# Patient Record
Sex: MALE | Race: WHITE | NOT HISPANIC OR LATINO | Employment: OTHER | URBAN - METROPOLITAN AREA
[De-identification: names, ages, dates, MRNs, and addresses within clinical notes are randomized per-mention and may not be internally consistent; named-entity substitution may affect disease eponyms.]

---

## 2017-04-17 ENCOUNTER — HISTORICAL (OUTPATIENT)
Dept: ADMINISTRATIVE | Facility: HOSPITAL | Age: 62
End: 2017-04-17

## 2017-05-17 ENCOUNTER — OFFICE VISIT (OUTPATIENT)
Dept: ALLERGY | Facility: CLINIC | Age: 62
End: 2017-05-17
Payer: COMMERCIAL

## 2017-05-17 VITALS
OXYGEN SATURATION: 98 % | SYSTOLIC BLOOD PRESSURE: 138 MMHG | WEIGHT: 203.19 LBS | DIASTOLIC BLOOD PRESSURE: 80 MMHG | BODY MASS INDEX: 27.52 KG/M2 | HEART RATE: 88 BPM | HEIGHT: 72 IN | RESPIRATION RATE: 18 BRPM

## 2017-05-17 DIAGNOSIS — L21.8 SEBORRHEA-LIKE DERMATITIS WITH PSORIASIFORM ELEMENTS: ICD-10-CM

## 2017-05-17 DIAGNOSIS — J30.1 NON-SEASONAL ALLERGIC RHINITIS DUE TO POLLEN: ICD-10-CM

## 2017-05-17 PROCEDURE — 95117 IMMUNOTHERAPY INJECTIONS: CPT | Mod: ,,, | Performed by: ALLERGY & IMMUNOLOGY

## 2017-05-17 PROCEDURE — 99499 UNLISTED E&M SERVICE: CPT | Mod: ,,, | Performed by: ALLERGY & IMMUNOLOGY

## 2017-05-17 RX ORDER — MINERAL OIL
180 ENEMA (ML) RECTAL DAILY
COMMUNITY
End: 2019-05-09

## 2017-05-17 NOTE — PROGRESS NOTES
Patient presents for immunotherapy. Tolerated his last injection.     Today pt received:   1:10 0.1 rx 1  1:10 0.1 rx 2    Pt observed 30 mins and discharged in good condition.

## 2017-05-17 NOTE — MR AVS SNAPSHOT
[facility]  [address]  [address]  [place] [address]  Phone: [phone]  Fax: [phone]                  Doug Quiros   2017 4:00 PM   Office Visit    Description:  Male : 1955   Provider:  Karen Parker MD   Department:  [facility] - Allergy           Reason for Visit     Immunotherapy           Diagnoses this Visit        Comments    Non-seasonal allergic rhinitis due to pollen         Seborrhea-like dermatitis with psoriasiform elements                To Do List           Goals (5 Years of Data)     None           Medications           Message regarding Medications     Verify the changes and/or additions to your medication regime listed below are the same as discussed with your clinician today.  If any of these changes or additions are incorrect, please notify your healthcare provider.             Verify that the below list of medications is an accurate representation of the medications you are currently taking.  If none reported, the list may be blank. If incorrect, please contact your healthcare provider. Carry this list with you in case of emergency.           Current Medications     fexofenadine (ALLEGRA ALLERGY) 180 MG tablet Take 180 mg by mouth once daily.           Clinical Reference Information           Your Vitals Were     BP Pulse Resp Height Weight SpO2    138/80 88 18 6' (1.829 m) 92.2 kg (203 lb 3.2 oz) 98%    PF BMI             450 L/min 27.56 kg/m2         Blood Pressure          Most Recent Value    BP  138/80      Allergies as of 2017     No Known Allergies      Immunizations Administered on Date of Encounter - 2017     None      [facility] Sign UP     Activating your [facility] account is as easy as 1-2-3!     1) Visit [email or web address], select Sign Up Now, enter this activation code and your date of birth, then select Next.  [email or web address]  Expires: 2017  4:36 PM      2) Create a username and password to use when you  visit [facility] in the future and select a security question in case you lose your password and select Next.    3) Enter your e-mail address and click Sign Up!    Additional Information  If you have questions, please  call [phone] to talk to our [facility] staff. Remember, [facility] is NOT to be used for urgent needs. For medical emergencies, dial 911.

## 2017-05-24 ENCOUNTER — CLINICAL SUPPORT (OUTPATIENT)
Dept: ALLERGY | Facility: CLINIC | Age: 62
End: 2017-05-24
Payer: COMMERCIAL

## 2017-05-24 VITALS — DIASTOLIC BLOOD PRESSURE: 80 MMHG | HEART RATE: 73 BPM | SYSTOLIC BLOOD PRESSURE: 122 MMHG | OXYGEN SATURATION: 98 %

## 2017-05-24 DIAGNOSIS — J30.1 NON-SEASONAL ALLERGIC RHINITIS DUE TO POLLEN: Primary | ICD-10-CM

## 2017-05-24 PROCEDURE — 95117 IMMUNOTHERAPY INJECTIONS: CPT | Mod: ,,, | Performed by: ALLERGY & IMMUNOLOGY

## 2017-05-24 PROCEDURE — 99499 UNLISTED E&M SERVICE: CPT | Mod: ,,, | Performed by: ALLERGY & IMMUNOLOGY

## 2017-05-24 RX ORDER — LEVOCETIRIZINE DIHYDROCHLORIDE 5 MG/1
5 TABLET, FILM COATED ORAL NIGHTLY
COMMUNITY
End: 2018-11-01 | Stop reason: SDUPTHER

## 2017-05-25 NOTE — PROGRESS NOTES
Pt presents for immunotherapy    Rx vial 1 and 2  0.2 mL 1:10    Pt tolerating injections and states his rhinitis is controlled.     Pt observed in clinic for 30 mins and discharged in good condition.

## 2017-05-31 ENCOUNTER — CLINICAL SUPPORT (OUTPATIENT)
Dept: ALLERGY | Facility: CLINIC | Age: 62
End: 2017-05-31
Payer: COMMERCIAL

## 2017-05-31 VITALS — OXYGEN SATURATION: 99 % | DIASTOLIC BLOOD PRESSURE: 80 MMHG | SYSTOLIC BLOOD PRESSURE: 130 MMHG | HEART RATE: 69 BPM

## 2017-05-31 DIAGNOSIS — J30.1 NON-SEASONAL ALLERGIC RHINITIS DUE TO POLLEN: Primary | ICD-10-CM

## 2017-05-31 PROCEDURE — 95117 IMMUNOTHERAPY INJECTIONS: CPT | Mod: ,,, | Performed by: ALLERGY & IMMUNOLOGY

## 2017-05-31 PROCEDURE — 99499 UNLISTED E&M SERVICE: CPT | Mod: ,,, | Performed by: ALLERGY & IMMUNOLOGY

## 2017-05-31 NOTE — PATIENT INSTRUCTIONS
Continue weekly injection.     Continue antihistamine prior to your injection.      please call if you have any questions or concerns.

## 2017-05-31 NOTE — PROGRESS NOTES
Pt presents for his weekly allergy immunotherapy. He tolerates his injections well.     Rx vial 1   1:10   0.3    Rx vial 2   1:10   0.3      Pt observed in clinic for 30 mins and discharged in good condition.

## 2017-06-07 ENCOUNTER — CLINICAL SUPPORT (OUTPATIENT)
Dept: ALLERGY | Facility: CLINIC | Age: 62
End: 2017-06-07
Payer: COMMERCIAL

## 2017-06-07 VITALS
SYSTOLIC BLOOD PRESSURE: 124 MMHG | HEART RATE: 81 BPM | BODY MASS INDEX: 28.21 KG/M2 | DIASTOLIC BLOOD PRESSURE: 84 MMHG | WEIGHT: 208 LBS | OXYGEN SATURATION: 98 %

## 2017-06-07 DIAGNOSIS — J30.1 NON-SEASONAL ALLERGIC RHINITIS DUE TO POLLEN: Primary | ICD-10-CM

## 2017-06-07 PROCEDURE — 99499 UNLISTED E&M SERVICE: CPT | Mod: ,,, | Performed by: ALLERGY & IMMUNOLOGY

## 2017-06-07 PROCEDURE — 95117 IMMUNOTHERAPY INJECTIONS: CPT | Mod: ,,, | Performed by: ALLERGY & IMMUNOLOGY

## 2017-06-07 NOTE — PROGRESS NOTES
Pt presents for his weekly allergy immunotherapy. He tolerates his injections well.      Rx vial 1   1:10   0.4     Rx vial 2   1:10   0.4        Pt observed in clinic for 30 mins and discharged in good condition.

## 2017-06-14 ENCOUNTER — CLINICAL SUPPORT (OUTPATIENT)
Dept: ALLERGY | Facility: CLINIC | Age: 62
End: 2017-06-14
Payer: COMMERCIAL

## 2017-06-14 VITALS — SYSTOLIC BLOOD PRESSURE: 130 MMHG | HEART RATE: 81 BPM | DIASTOLIC BLOOD PRESSURE: 70 MMHG | OXYGEN SATURATION: 98 %

## 2017-06-14 DIAGNOSIS — J30.1 NON-SEASONAL ALLERGIC RHINITIS DUE TO POLLEN: Primary | ICD-10-CM

## 2017-06-14 PROCEDURE — 99499 UNLISTED E&M SERVICE: CPT | Mod: ,,, | Performed by: ALLERGY & IMMUNOLOGY

## 2017-06-14 PROCEDURE — 95117 IMMUNOTHERAPY INJECTIONS: CPT | Mod: ,,, | Performed by: ALLERGY & IMMUNOLOGY

## 2017-06-15 NOTE — PROGRESS NOTES
Immunotherapy Note: Allergy Shot      Subjective:       Patient ID: Doug Quiros is a 61 y.o. male presents for weekly immunotherapy  Tolerated last injection  No New Medications  Beta Blockers: not on beta blocker    Concentration:    Volume:     Rx vial 1   1:10   0.5     Rx vial 2   1:10   0.5      Objective:     Doug Quiros observed for 30 minutes and discharged in good condition        Discussion:     Pt understands the current recommendation. All questions answered to the best of my ability. Continue current management plan.          Electronically signed by Karen Parker    Allergy/Imunology  [facility]  [address]  [place] [address]   Office [phone]

## 2017-06-21 ENCOUNTER — CLINICAL SUPPORT (OUTPATIENT)
Dept: ALLERGY | Facility: CLINIC | Age: 62
End: 2017-06-21
Payer: COMMERCIAL

## 2017-06-21 VITALS
BODY MASS INDEX: 27.92 KG/M2 | HEIGHT: 72 IN | WEIGHT: 206.13 LBS | DIASTOLIC BLOOD PRESSURE: 70 MMHG | SYSTOLIC BLOOD PRESSURE: 120 MMHG

## 2017-06-21 DIAGNOSIS — J30.1 NON-SEASONAL ALLERGIC RHINITIS DUE TO POLLEN: Primary | ICD-10-CM

## 2017-06-21 PROCEDURE — 99499 UNLISTED E&M SERVICE: CPT | Mod: ,,, | Performed by: ALLERGY & IMMUNOLOGY

## 2017-06-21 PROCEDURE — 95117 IMMUNOTHERAPY INJECTIONS: CPT | Mod: ,,, | Performed by: ALLERGY & IMMUNOLOGY

## 2017-06-21 NOTE — PROGRESS NOTES
Immunotherapy Note: Allergy Shot      Subjective:       Patient ID: Doug Quiros is a 61 y.o. male presents for weekly immunotherapy  Tolerated last injection  No New Medications  Beta Blockers: not on beta blocker    Concentration:  1:10 Rx vial 1 and 2    Volume: 0.5 mL rx vial 1 and 2       Objective:     Doug Quiros observed for 30 minutes and discharged in good condition        Discussion:     Pt understands the current recommendation. All questions answered to the best of my ability. Continue current management plan.          Electronically signed by Karen Parker    Allergy/Imunology  [facility]  [address]  [place] [address]   Office [phone]

## 2017-06-29 ENCOUNTER — CLINICAL SUPPORT (OUTPATIENT)
Dept: ALLERGY | Facility: CLINIC | Age: 62
End: 2017-06-29
Payer: COMMERCIAL

## 2017-06-29 VITALS
WEIGHT: 207 LBS | BODY MASS INDEX: 28.07 KG/M2 | OXYGEN SATURATION: 97 % | DIASTOLIC BLOOD PRESSURE: 80 MMHG | SYSTOLIC BLOOD PRESSURE: 120 MMHG | HEART RATE: 75 BPM

## 2017-06-29 DIAGNOSIS — J30.1 NON-SEASONAL ALLERGIC RHINITIS DUE TO POLLEN: Primary | ICD-10-CM

## 2017-06-29 PROCEDURE — 99499 UNLISTED E&M SERVICE: CPT | Mod: ,,, | Performed by: ALLERGY & IMMUNOLOGY

## 2017-06-29 PROCEDURE — 95117 IMMUNOTHERAPY INJECTIONS: CPT | Mod: ,,, | Performed by: ALLERGY & IMMUNOLOGY

## 2017-06-29 NOTE — PROGRESS NOTES
Immunotherapy Note: Allergy Shot      Subjective:       Patient ID: Doug Quiros is a 61 y.o. male presents for weekly immunotherapy  Tolerated last injection  No New Medications  Beta Blockers: not on beta blocker    Reaction with last injection?: No  If female, are you pregnant?: No  Are you on any beta blockers?: No  Has the vial ?: No    Are you ill today?: No  Asthma exacerbation or symptoms: No  Do you have a fever today?: No    Peak Flow: 680    Vial Concentration: 1:1 v/v (RED)  Dose (mL) #1: 0.2 mL  Location: Right upper arm  Reaction (Wheal/flare in mm) #1: 0  Given By: rw    Vial Concentration: 1:1 v/v (RED)  Dose (mL) #2: 0.2 mL  Location: Left upper arm  Reaction (Wheal/flare in mm) #2: 0  Given By : rw      Objective:     Doug Quiros observed for 30 minutes and discharged in good condition        Discussion:     Pt understands the current recommendation. All questions answered to the best of my ability. Continue current management plan.          Electronically signed by Karen Parker    Allergy/Imunology  [facility]  [place] [address] [facility]  [place] [address]   Office [phone]

## 2017-07-05 ENCOUNTER — TELEPHONE (OUTPATIENT)
Dept: ALLERGY | Facility: CLINIC | Age: 62
End: 2017-07-05

## 2017-07-05 NOTE — TELEPHONE ENCOUNTER
Pt states that he has two little tender bumps under his (l) axilla that are now indurated. Cancelled his injection this week (out of town). Last injection was Thursday 06/29/17. Unsure if this could be related to his allergy shot.

## 2017-07-06 ENCOUNTER — DOCUMENTATION ONLY (OUTPATIENT)
Dept: ALLERGY | Facility: CLINIC | Age: 62
End: 2017-07-06

## 2017-07-06 DIAGNOSIS — L08.9 STAPH SKIN INFECTION: Primary | ICD-10-CM

## 2017-07-06 DIAGNOSIS — B95.8 STAPH SKIN INFECTION: Primary | ICD-10-CM

## 2017-07-06 RX ORDER — CLINDAMYCIN HYDROCHLORIDE 300 MG/1
300 CAPSULE ORAL EVERY 8 HOURS
Qty: 21 CAPSULE | Refills: 0 | Status: SHIPPED | OUTPATIENT
Start: 2017-07-06 | End: 2017-07-13

## 2017-07-06 NOTE — PROGRESS NOTES
Pt swings by for me to look at bumps under his left arm.   Appear to be abscesses. Will give antibiotics. Clindamycin 300 mg po tid x 7 days.

## 2017-07-13 ENCOUNTER — CLINICAL SUPPORT (OUTPATIENT)
Dept: ALLERGY | Facility: CLINIC | Age: 62
End: 2017-07-13
Payer: COMMERCIAL

## 2017-07-13 VITALS — OXYGEN SATURATION: 98 % | HEART RATE: 65 BPM | SYSTOLIC BLOOD PRESSURE: 120 MMHG | DIASTOLIC BLOOD PRESSURE: 80 MMHG

## 2017-07-13 DIAGNOSIS — J30.89 CHRONIC NONSEASONAL ALLERGIC RHINITIS DUE TO POLLEN: Primary | ICD-10-CM

## 2017-07-13 PROCEDURE — 95117 IMMUNOTHERAPY INJECTIONS: CPT | Mod: ,,, | Performed by: ALLERGY & IMMUNOLOGY

## 2017-07-13 PROCEDURE — 99499 UNLISTED E&M SERVICE: CPT | Mod: ,,, | Performed by: ALLERGY & IMMUNOLOGY

## 2017-07-14 NOTE — PROGRESS NOTES
Immunotherapy Note: Allergy Shot      Subjective:       Patient ID: Doug Quiros is a 61 y.o. male presents for weekly immunotherapy  Tolerated last injection  No New Medications  Beta Blockers: not on beta blocker    Reaction with last injection?: No  If female, are you pregnant?: No  Are you on any beta blockers?: No  Has the vial ?: No    Are you ill today?: No  Asthma exacerbation or symptoms: No  Do you have a fever today?: No    Peak Flow: 540    Expiration Date #1: 18  Vial Concentration: 1:1 v/v (RED)  Dose (mL) #1: 0.2 mL  Location: Right upper arm  Given By: rw    Expiration Date #2: 18  Vial Concentration: 1:1 v/v (RED)  Dose (mL) #2: 0.2 mL  Location: Left upper arm  Given By : rw      Objective:     Doug Quiros observed for 30 minutes and discharged in good condition        Discussion:     Pt understands the current recommendation. All questions answered to the best of my ability. Continue current management plan.          Electronically signed by Karen Parker    Allergy/Imunology  [facility]  [place] [address] [facility] [facility]  [place] [address]   Office [phone]

## 2017-07-20 ENCOUNTER — CLINICAL SUPPORT (OUTPATIENT)
Dept: ALLERGY | Facility: CLINIC | Age: 62
End: 2017-07-20
Payer: COMMERCIAL

## 2017-07-20 VITALS
OXYGEN SATURATION: 98 % | SYSTOLIC BLOOD PRESSURE: 130 MMHG | WEIGHT: 207 LBS | BODY MASS INDEX: 28.07 KG/M2 | DIASTOLIC BLOOD PRESSURE: 70 MMHG | HEART RATE: 71 BPM

## 2017-07-20 DIAGNOSIS — J30.89 CHRONIC NONSEASONAL ALLERGIC RHINITIS DUE TO POLLEN: Primary | ICD-10-CM

## 2017-07-20 PROCEDURE — 95117 IMMUNOTHERAPY INJECTIONS: CPT | Mod: ,,, | Performed by: ALLERGY & IMMUNOLOGY

## 2017-07-20 PROCEDURE — 99499 UNLISTED E&M SERVICE: CPT | Mod: ,,, | Performed by: ALLERGY & IMMUNOLOGY

## 2017-07-20 NOTE — PROGRESS NOTES
Immunotherapy Note: Allergy Shot      Subjective:       Patient ID: Doug Quiros is a 61 y.o. male presents for weekly immunotherapy  Tolerated last injection  No New Medications  Beta Blockers: not on beta blocker  Reaction with last injection?: No  If female, are you pregnant?: No  Are you on any beta blockers?: No  Has the vial ?: No    Are you ill today?: No  Asthma exacerbation or symptoms: No  Do you have a fever today?: No    Peak Flow: 540    Expiration Date #1: 18  Vial Concentration: 1:1 v/v (RED)  Dose (mL) #1: 0.3 mL  Location: Right upper arm  Given By: rw    Expiration Date #2: 18  Vial Concentration: 1:1 v/v (RED)  Dose (mL) #2: 0.3 mL  Location: Left upper arm  Given By : rw                         Objective:     Doug Quiros observed for 30 minutes and discharged in good condition        Discussion:     Pt understands the current recommendation. All questions answered to the best of my ability. Continue current management plan.          Electronically signed by Karen Parker    Allergy/Imunology  [facility]  [place] [address] [address] [facility]  [place] [address]   Office [phone]

## 2017-07-27 ENCOUNTER — CLINICAL SUPPORT (OUTPATIENT)
Dept: ALLERGY | Facility: CLINIC | Age: 62
End: 2017-07-27
Payer: COMMERCIAL

## 2017-07-27 VITALS — DIASTOLIC BLOOD PRESSURE: 74 MMHG | HEART RATE: 83 BPM | OXYGEN SATURATION: 98 % | SYSTOLIC BLOOD PRESSURE: 120 MMHG

## 2017-07-27 DIAGNOSIS — J30.89 CHRONIC NONSEASONAL ALLERGIC RHINITIS DUE TO POLLEN: Primary | ICD-10-CM

## 2017-07-27 PROCEDURE — 99499 UNLISTED E&M SERVICE: CPT | Mod: ,,, | Performed by: ALLERGY & IMMUNOLOGY

## 2017-07-27 PROCEDURE — 95117 IMMUNOTHERAPY INJECTIONS: CPT | Mod: ,,, | Performed by: ALLERGY & IMMUNOLOGY

## 2017-07-29 NOTE — PROGRESS NOTES
Immunotherapy Note: Allergy Shot      Subjective:       Patient ID: Doug Quiros is a 61 y.o. male presents for weekly immunotherapy  Tolerated last injection  No New Medications  Beta Blockers: not on beta blocker            Peak Flow: 560    Expiration Date #1: 02/08/18  Vial Concentration: 1:1 v/v (RED)  Dose (mL) #1: 0.4 mL  Location: Right upper arm  Given By: rw    Expiration Date #2: 02/14/18  Vial Concentration: 1:1 v/v (RED)  Dose (mL) #2: 0.4 mL  Location: Left upper arm  Given By : rw                         Objective:     Doug Quiros observed for 30 minutes and discharged in good condition        Discussion:     Pt understands the current recommendation. All questions answered to the best of my ability. Continue current management plan.          Electronically signed by Karen Parker    Allergy/Imunology  [facility]  [place] [address]  [place] [address]   Office [phone]

## 2017-08-03 ENCOUNTER — CLINICAL SUPPORT (OUTPATIENT)
Dept: ALLERGY | Facility: CLINIC | Age: 62
End: 2017-08-03
Payer: COMMERCIAL

## 2017-08-03 VITALS — HEART RATE: 72 BPM | DIASTOLIC BLOOD PRESSURE: 74 MMHG | SYSTOLIC BLOOD PRESSURE: 120 MMHG | OXYGEN SATURATION: 99 %

## 2017-08-03 DIAGNOSIS — J30.89 CHRONIC NONSEASONAL ALLERGIC RHINITIS DUE TO POLLEN: Primary | ICD-10-CM

## 2017-08-03 PROCEDURE — 95117 IMMUNOTHERAPY INJECTIONS: CPT | Mod: ,,, | Performed by: ALLERGY & IMMUNOLOGY

## 2017-08-03 PROCEDURE — 99499 UNLISTED E&M SERVICE: CPT | Mod: ,,, | Performed by: ALLERGY & IMMUNOLOGY

## 2017-08-03 NOTE — PROGRESS NOTES
Immunotherapy Note: Allergy Shot      Subjective:       Patient ID: Doug Quiros is a 61 y.o. male presents for weekly immunotherapy  Tolerated last injection  No New Medications  Beta Blockers: not on beta blocker  Reaction with last injection?: No  If female, are you pregnant?: No  Are you on any beta blockers?: No  Has the vial ?: No    Are you ill today?: No  Asthma exacerbation or symptoms: No  Do you have a fever today?: No    Peak Flow: 550    Expiration Date #1: 18  Vial Concentration: 1:1 v/v (RED)  Dose (mL) #1: 0.5 mL  Location: Right upper arm  Given By: rw    Expiration Date #2: 17  Vial Concentration: 1:1 v/v (RED)  Dose (mL) #2: 0.5 mL  Location: Left upper arm  Given By : rw                         Objective:     Doug Quiros observed for 30 minutes and discharged in good condition        Discussion:     Pt understands the current recommendation. All questions answered to the best of my ability. Continue current management plan.          Electronically signed by Karen Parker    Allergy/Imunology  [facility]  [place] [address] [address] [facility]  [place] [address]   Office [phone]

## 2017-08-15 ENCOUNTER — CLINICAL SUPPORT (OUTPATIENT)
Dept: ALLERGY | Facility: CLINIC | Age: 62
End: 2017-08-15
Payer: COMMERCIAL

## 2017-08-15 VITALS — SYSTOLIC BLOOD PRESSURE: 122 MMHG | HEART RATE: 84 BPM | OXYGEN SATURATION: 98 % | DIASTOLIC BLOOD PRESSURE: 84 MMHG

## 2017-08-15 DIAGNOSIS — J30.89 CHRONIC NONSEASONAL ALLERGIC RHINITIS DUE TO POLLEN: Primary | ICD-10-CM

## 2017-08-15 PROCEDURE — 95117 IMMUNOTHERAPY INJECTIONS: CPT | Mod: ,,, | Performed by: ALLERGY & IMMUNOLOGY

## 2017-08-15 PROCEDURE — 99499 UNLISTED E&M SERVICE: CPT | Mod: ,,, | Performed by: ALLERGY & IMMUNOLOGY

## 2017-08-15 NOTE — PROGRESS NOTES
Immunotherapy Note: Allergy Shot      Subjective:       Patient ID: Doug Quiros is a 61 y.o. male presents for weekly immunotherapy  Tolerated last injection  No New Medications  Beta Blockers: not on beta blocker    Reaction with last injection?: No  If female, are you pregnant?: No  Are you on any beta blockers?: No  Has the vial ?: No    Are you ill today?: No  Asthma exacerbation or symptoms: No  Do you have a fever today?: No    Peak Flow: 600    Expiration Date #1: 18  Vial Concentration: 1:1 v/v (RED)  Dose (mL) #1: 0.5 mL  Location: Right upper arm  Given By: rw    Expiration Date #2: 18  Vial Concentration: 1:1 v/v (RED)  Dose (mL) #2: 0.5 mL  Location: Left upper arm  Given By : rw                       Objective:     Doug Quiros observed for 30 minutes and discharged in good condition        Discussion:     Pt understands the current recommendation. All questions answered to the best of my ability. Continue current management plan.          Electronically signed by Karen Parker    Allergy/Imunology  [facility]  [place] [address] [address] [facility]  [place] [address]   Office [phone]

## 2017-08-29 ENCOUNTER — CLINICAL SUPPORT (OUTPATIENT)
Dept: ALLERGY | Facility: CLINIC | Age: 62
End: 2017-08-29
Payer: COMMERCIAL

## 2017-08-29 VITALS
HEIGHT: 72 IN | DIASTOLIC BLOOD PRESSURE: 70 MMHG | BODY MASS INDEX: 28.27 KG/M2 | SYSTOLIC BLOOD PRESSURE: 140 MMHG | WEIGHT: 208.69 LBS

## 2017-08-29 DIAGNOSIS — J30.89 CHRONIC NONSEASONAL ALLERGIC RHINITIS DUE TO POLLEN: Primary | ICD-10-CM

## 2017-08-29 PROCEDURE — 95117 IMMUNOTHERAPY INJECTIONS: CPT | Mod: ,,, | Performed by: ALLERGY & IMMUNOLOGY

## 2017-08-29 PROCEDURE — 99499 UNLISTED E&M SERVICE: CPT | Mod: ,,, | Performed by: ALLERGY & IMMUNOLOGY

## 2017-08-30 NOTE — PROGRESS NOTES
Immunotherapy Note: Allergy Shot      Subjective:       Patient ID: Doug Quiros is a 61 y.o. male presents for monthly immunotherapy  Tolerated last injection  No New Medications  Beta Blockers: not on beta blocker    Reaction with last injection?: No  If female, are you pregnant?: No  Are you on any beta blockers?: No  Has the vial ?: No    Are you ill today?: No  Asthma exacerbation or symptoms: No  Do you have a fever today?: No    Peak Flow: 560    Expiration Date #1: 18  Vial Concentration: 1:1 v/v (RED)  Dose (mL) #1: 0.5 mL  Location: Right upper arm  Given By: sd    Expiration Date #2: 18  Vial Concentration: 1:1 v/v (RED)  Dose (mL) #2: 0.5 mL  Location: Left upper arm  Given By : sd                           Objective:     Doug Quiros observed for 30 minutes and discharged in good condition        Discussion:     Pt understands the current recommendation. All questions answered to the best of my ability. Continue current management plan.      Electronically signed by Karen Parker    Allergy/Imunology  [facility]  [place] [address]  [place] [address]   Office [phone]

## 2017-09-14 ENCOUNTER — CLINICAL SUPPORT (OUTPATIENT)
Dept: ALLERGY | Facility: CLINIC | Age: 62
End: 2017-09-14
Payer: COMMERCIAL

## 2017-09-14 VITALS — OXYGEN SATURATION: 97 % | HEART RATE: 85 BPM | SYSTOLIC BLOOD PRESSURE: 122 MMHG | DIASTOLIC BLOOD PRESSURE: 80 MMHG

## 2017-09-14 DIAGNOSIS — J30.89 CHRONIC NONSEASONAL ALLERGIC RHINITIS DUE TO POLLEN: Primary | ICD-10-CM

## 2017-09-14 PROCEDURE — 95117 IMMUNOTHERAPY INJECTIONS: CPT | Mod: ,,, | Performed by: ALLERGY & IMMUNOLOGY

## 2017-09-14 PROCEDURE — 99499 UNLISTED E&M SERVICE: CPT | Mod: ,,, | Performed by: ALLERGY & IMMUNOLOGY

## 2017-09-20 NOTE — PROGRESS NOTES
Immunotherapy Note: Allergy Shot      Subjective:       Patient ID: Doug Quiros is a 61 y.o. male presents for monthly immunotherapy  Tolerated last injection  No New Medications  Beta Blockers: not on beta blocker    Reaction with last injection?: No  If female, are you pregnant?: No  Are you on any beta blockers?: No  Has the vial ?: No    Are you ill today?: No  Asthma exacerbation or symptoms: No  Do you have a fever today?: No    Peak Flow: 650    Expiration Date #1: 18  Vial Concentration: 1:1 v/v (RED)  Dose (mL) #1: 0.5 mL  Location: Right upper arm  Given By: smd    Expiration Date #2: 18  Vial Concentration: 1:1 v/v (RED)  Dose (mL) #2: 0.5 mL  Location: Left upper arm  Given By : eladio                           Objective:     oDug Quiros observed for 30 minutes and discharged in good condition        Discussion:     Pt understands the current recommendation. All questions answered to the best of my ability. Continue current management plan.      Electronically signed by Karen Parker    Allergy/Imunology  [facility]  [place] [address] [address]  [place] [address]   Office [phone]

## 2017-10-04 ENCOUNTER — CLINICAL SUPPORT (OUTPATIENT)
Dept: ALLERGY | Facility: CLINIC | Age: 62
End: 2017-10-04
Payer: COMMERCIAL

## 2017-10-04 VITALS — SYSTOLIC BLOOD PRESSURE: 138 MMHG | DIASTOLIC BLOOD PRESSURE: 88 MMHG | OXYGEN SATURATION: 98 % | HEART RATE: 88 BPM

## 2017-10-04 DIAGNOSIS — J30.89 CHRONIC NONSEASONAL ALLERGIC RHINITIS DUE TO POLLEN: Primary | ICD-10-CM

## 2017-10-04 PROCEDURE — 95117 IMMUNOTHERAPY INJECTIONS: CPT | Mod: ,,, | Performed by: ALLERGY & IMMUNOLOGY

## 2017-10-04 PROCEDURE — 99499 UNLISTED E&M SERVICE: CPT | Mod: ,,, | Performed by: ALLERGY & IMMUNOLOGY

## 2017-10-09 NOTE — PROGRESS NOTES
Immunotherapy Note: Allergy Shot      Subjective:       Patient ID: Doug Quiros is a 61 y.o. male presents for monthly immunotherapy  Tolerated last injection  No New Medications  Beta Blockers: not on beta blocker    Reaction with last injection?: No  If female, are you pregnant?: No  Are you on any beta blockers?: No    Are you ill today?: No  Asthma exacerbation or symptoms: No  Do you have a fever today?: No    Peak Flow: 530    Expiration Date #1: 02/14/18  Vial Concentration: 1:1 v/v (RED)  Dose (mL) #1: 0.5 mL  Location: Right upper arm  Given By: sd    Expiration Date #2: 02/14/18  Vial Concentration: 1:1 v/v (RED)  Dose (mL) #2: 0.5 mL  Location: Left upper arm  Given By : sd                           Objective:     Doug Quiros observed for 30 minutes and discharged in good condition        Discussion:     Pt understands the current recommendation. All questions answered to the best of my ability. Continue current management plan.      Electronically signed by Karen Parker    Allergy/Imunology  [facility]  [place] [address]  [place] [address]   Office [phone]

## 2017-11-09 ENCOUNTER — CLINICAL SUPPORT (OUTPATIENT)
Dept: ALLERGY | Facility: CLINIC | Age: 62
End: 2017-11-09
Payer: COMMERCIAL

## 2017-11-09 DIAGNOSIS — J30.89 CHRONIC NONSEASONAL ALLERGIC RHINITIS DUE TO POLLEN: Primary | ICD-10-CM

## 2017-11-09 PROCEDURE — 99499 UNLISTED E&M SERVICE: CPT | Mod: ,,, | Performed by: ALLERGY & IMMUNOLOGY

## 2017-11-09 PROCEDURE — 95117 IMMUNOTHERAPY INJECTIONS: CPT | Mod: ,,, | Performed by: ALLERGY & IMMUNOLOGY

## 2017-11-10 VITALS
WEIGHT: 214.75 LBS | DIASTOLIC BLOOD PRESSURE: 70 MMHG | BODY MASS INDEX: 29.12 KG/M2 | SYSTOLIC BLOOD PRESSURE: 140 MMHG

## 2017-11-11 NOTE — PROGRESS NOTES
Immunotherapy Note: Allergy Shot      Subjective:       Patient ID: Doug Quiros is a 61 y.o. male presents for monthly immunotherapy  Tolerated last injection  No New Medications  Beta Blockers: not on beta blocker    Reaction with last injection?: No  If female, are you pregnant?: No  Are you on any beta blockers?: No  Has the vial ?: No    Are you ill today?: No  Asthma exacerbation or symptoms: No  Do you have a fever today?: No    Peak Flow: 550    Expiration Date #1: 18  Vial Concentration: 1:1 v/v (RED)  Dose (mL) #1: 0.5 mL  Location: Right upper arm  Given By: rw    Expiration Date #2: 18  Vial Concentration: 1:1 v/v (RED)  Dose (mL) #2: 0.5 mL  Location: Left upper arm  Given By : rw                           Objective:     Doug Quiros observed for 30 minutes and discharged in good condition        Discussion:     Pt understands the current recommendation. All questions answered to the best of my ability. Continue current management plan.      Electronically signed by Karen Parker    Allergy/Imunology  [facility]  [place] [address]  [place] [address]   Office [phone]

## 2017-11-16 ENCOUNTER — DOCUMENTATION ONLY (OUTPATIENT)
Dept: ALLERGY | Facility: CLINIC | Age: 62
End: 2017-11-16
Payer: COMMERCIAL

## 2017-11-16 DIAGNOSIS — J30.89 CHRONIC NONSEASONAL ALLERGIC RHINITIS DUE TO POLLEN: ICD-10-CM

## 2017-11-16 PROCEDURE — 95165 ANTIGEN THERAPY SERVICES: CPT | Mod: ,,, | Performed by: ALLERGY & IMMUNOLOGY

## 2017-11-16 NOTE — PROGRESS NOTES
Using 70% alcohol clean surface was prepped. Sterile drape then covered clean space.  Hands were scrubbed and placed into sterile gloves. Gown, mask, and hair net worn according to  guidelines allergist exemption.    40 units.     Karen Parker M.D.  Allergy/Immunology  [facility]   [phone] phone  [phone] fax

## 2017-12-11 ENCOUNTER — CLINICAL SUPPORT (OUTPATIENT)
Dept: ALLERGY | Facility: CLINIC | Age: 62
End: 2017-12-11
Payer: COMMERCIAL

## 2017-12-11 VITALS — DIASTOLIC BLOOD PRESSURE: 78 MMHG | SYSTOLIC BLOOD PRESSURE: 120 MMHG | OXYGEN SATURATION: 98 % | HEART RATE: 75 BPM

## 2017-12-11 DIAGNOSIS — J30.89 CHRONIC NONSEASONAL ALLERGIC RHINITIS DUE TO POLLEN: Primary | ICD-10-CM

## 2017-12-11 PROCEDURE — 95117 IMMUNOTHERAPY INJECTIONS: CPT | Mod: ,,, | Performed by: ALLERGY & IMMUNOLOGY

## 2017-12-11 PROCEDURE — 99499 UNLISTED E&M SERVICE: CPT | Mod: ,,, | Performed by: ALLERGY & IMMUNOLOGY

## 2017-12-11 NOTE — PROGRESS NOTES
Immunotherapy Note: Allergy Shot      Subjective:       Patient ID: Doug Quiros is a 62 y.o. male presents for monthly immunotherapy  Tolerated last injection  No New Medications  Beta Blockers: not on beta blocker    Reaction with last injection?: No  If female, are you pregnant?: No  Are you on any beta blockers?: No  Has the vial ?: No    Are you ill today?: No  Asthma exacerbation or symptoms: No  Do you have a fever today?: No    Peak Flow: 600    Expiration Date #1: 11/10/18  Vial Concentration: 1:1 v/v (RED)  Dose (mL) #1: 0.4 mL  Location: Right upper arm  Given By: rw    Expiration Date #2: 11/10/18  Vial Concentration: 1:1 v/v (RED)  Dose (mL) #2: 0.4 mL  Location: Left upper arm  Given By : rw                           Objective:     Doug Quiros observed for 30 minutes and discharged in good condition        Discussion:     Pt understands the current recommendation. All questions answered to the best of my ability. Continue current management plan.      Electronically signed by Karen Parker    Allergy/Imunology  [facility]  [place] [address] [address]  [place] [address]   Office [phone]

## 2018-01-11 ENCOUNTER — CLINICAL SUPPORT (OUTPATIENT)
Dept: ALLERGY | Facility: CLINIC | Age: 63
End: 2018-01-11
Payer: COMMERCIAL

## 2018-01-11 VITALS
BODY MASS INDEX: 27.66 KG/M2 | DIASTOLIC BLOOD PRESSURE: 84 MMHG | SYSTOLIC BLOOD PRESSURE: 158 MMHG | WEIGHT: 204.19 LBS | HEIGHT: 72 IN

## 2018-01-11 DIAGNOSIS — J30.89 CHRONIC NONSEASONAL ALLERGIC RHINITIS DUE TO POLLEN: Primary | ICD-10-CM

## 2018-01-11 PROCEDURE — 99499 UNLISTED E&M SERVICE: CPT | Mod: ,,, | Performed by: ALLERGY & IMMUNOLOGY

## 2018-01-11 PROCEDURE — 95117 IMMUNOTHERAPY INJECTIONS: CPT | Mod: ,,, | Performed by: ALLERGY & IMMUNOLOGY

## 2018-01-11 NOTE — PROGRESS NOTES
Immunotherapy Note: Allergy Shot      Subjective:       Patient ID: Doug Quiros is a 62 y.o. male presents for monthly immunotherapy  Tolerated last injection  No New Medications  Beta Blockers: not on beta blocker    Reaction with last injection?: No  If female, are you pregnant?: No  Are you on any beta blockers?: No  Has the vial ?: No    Are you ill today?: No  Asthma exacerbation or symptoms: No  Do you have a fever today?: No    Peak Flow: 700    Expiration Date #1: 11/10/18  Vial Concentration: 1:1 v/v (RED)  Dose (mL) #1: 0.5 mL  Location: Right upper arm  Given By: ANEUDY    Expiration Date #2: 11/10/18  Vial Concentration: 1:1 v/v (RED)  Dose (mL) #2: 0.5 mL  Location: Left upper arm  Given By : ANEUDY                           Objective:     Doug Quiros observed for 30 minutes and discharged in good condition        Discussion:     Pt understands the current recommendation. All questions answered to the best of my ability. Continue current management plan.      Electronically signed by Karen Parker    Allergy/Imunology  [facility]  [place] [address]  [place] [address]   Office [phone]

## 2018-02-08 ENCOUNTER — CLINICAL SUPPORT (OUTPATIENT)
Dept: ALLERGY | Facility: CLINIC | Age: 63
End: 2018-02-08
Payer: COMMERCIAL

## 2018-02-08 VITALS
WEIGHT: 198.63 LBS | SYSTOLIC BLOOD PRESSURE: 116 MMHG | DIASTOLIC BLOOD PRESSURE: 70 MMHG | BODY MASS INDEX: 26.9 KG/M2 | HEIGHT: 72 IN

## 2018-02-08 DIAGNOSIS — J30.89 CHRONIC NONSEASONAL ALLERGIC RHINITIS DUE TO POLLEN: Primary | ICD-10-CM

## 2018-02-08 PROCEDURE — 99499 UNLISTED E&M SERVICE: CPT | Mod: ,,, | Performed by: ALLERGY & IMMUNOLOGY

## 2018-02-08 PROCEDURE — 95117 IMMUNOTHERAPY INJECTIONS: CPT | Mod: ,,, | Performed by: ALLERGY & IMMUNOLOGY

## 2018-02-08 NOTE — PROGRESS NOTES
Immunotherapy Note: Allergy Shot      Subjective:       Patient ID: Doug Quiros is a 62 y.o. male presents for monthly immunotherapy  Tolerated last injection  No New Medications  Beta Blockers: not on beta blocker           Reaction with last injection?: No  If female, are you pregnant?: No  Are you on any beta blockers?: No  Has the vial ?: No     Are you ill today?: No  Asthma exacerbation or symptoms: No  Do you have a fever today?: No     Peak Flow: 610     Expiration Date #1: 11/10/18  Vial Concentration: 1:1 v/v (RED)  Dose (mL) #1: 0.5 mL  Location: Right upper arm  Given By: ANEUDY     Expiration Date #2: 11/10/18  Vial Concentration: 1:1 v/v (RED)  Dose (mL) #2: 0.5 mL  Location: Left upper arm  Given By : ANEUDY                                          Objective:     Doug Quiros observed for 30 minutes and discharged in good condition        Discussion:     Pt understands the current recommendation. All questions answered to the best of my ability. Continue current management plan.      Electronically signed by Karen Parker    Allergy/Imunology  [facility]  [place] [address]  [place] [address]   Office [phone]

## 2018-03-08 ENCOUNTER — DOCUMENTATION ONLY (OUTPATIENT)
Dept: ALLERGY | Facility: CLINIC | Age: 63
End: 2018-03-08

## 2018-03-08 ENCOUNTER — CLINICAL SUPPORT (OUTPATIENT)
Dept: ALLERGY | Facility: CLINIC | Age: 63
End: 2018-03-08
Payer: COMMERCIAL

## 2018-03-08 VITALS
SYSTOLIC BLOOD PRESSURE: 142 MMHG | WEIGHT: 190.81 LBS | HEIGHT: 72 IN | DIASTOLIC BLOOD PRESSURE: 78 MMHG | BODY MASS INDEX: 25.84 KG/M2

## 2018-03-08 DIAGNOSIS — J30.89 CHRONIC NONSEASONAL ALLERGIC RHINITIS DUE TO POLLEN: ICD-10-CM

## 2018-03-08 DIAGNOSIS — J30.89 CHRONIC NONSEASONAL ALLERGIC RHINITIS DUE TO POLLEN: Primary | ICD-10-CM

## 2018-03-08 PROCEDURE — 95117 IMMUNOTHERAPY INJECTIONS: CPT | Mod: ,,, | Performed by: ALLERGY & IMMUNOLOGY

## 2018-03-08 PROCEDURE — 95165 ANTIGEN THERAPY SERVICES: CPT | Mod: ,,, | Performed by: ALLERGY & IMMUNOLOGY

## 2018-03-08 PROCEDURE — 99499 UNLISTED E&M SERVICE: CPT | Mod: ,,, | Performed by: ALLERGY & IMMUNOLOGY

## 2018-03-08 NOTE — PROGRESS NOTES
Using 70% alcohol clean surface was prepped. Sterile drape then covered clean space.  Hands were scrubbed and placed into sterile gloves. Gown, mask, and hair net worn according to  guidelines allergist exemption.    Mixed extracts march 7th.     20 units

## 2018-03-08 NOTE — PROGRESS NOTES
Immunotherapy Note: Allergy Shot      Subjective:       Patient ID: Doug Quiros is a 62 y.o. male presents for monthly immunotherapy  Tolerated last injection  No New Medications  Beta Blockers: not on beta blocker    Reaction with last injection?: No   If female, are you pregnant?: No  Are you on any beta blockers?: No  Has the vial ?: No     Are you ill today?: No  Asthma exacerbation or symptoms: No  Do you have a fever today?: No    Peak Flow: 560    Expiration Date #1: 11/10/18  Vial Concentration: 1:1 v/v (RED)  Dose (mL) #1: 0.5 mL   Location: Right upper arm  Given By: ANEUDY    Expiration Date #2: 11/10/18  Vial Concentration: 1:1 v/v (RED)   Dose (mL) #2: 0.5 mL   Location: Left upper arm  Given By : ANEUDY                           Objective:     Doug Quiros observed for 30 minutes and discharged in good condition        Discussion:     Pt understands the current recommendation. All questions answered to the best of my ability. Continue current management plan.      Electronically signed by Karne Parker    Allergy/Imunology  [facility]  [place] [address] [address]  [place] [address]   Office [phone]

## 2018-04-12 ENCOUNTER — CLINICAL SUPPORT (OUTPATIENT)
Dept: ALLERGY | Facility: CLINIC | Age: 63
End: 2018-04-12
Payer: COMMERCIAL

## 2018-04-12 VITALS
OXYGEN SATURATION: 97 % | HEIGHT: 72 IN | SYSTOLIC BLOOD PRESSURE: 140 MMHG | DIASTOLIC BLOOD PRESSURE: 70 MMHG | HEART RATE: 84 BPM | WEIGHT: 183 LBS | BODY MASS INDEX: 24.79 KG/M2

## 2018-04-12 DIAGNOSIS — J30.89 CHRONIC NONSEASONAL ALLERGIC RHINITIS DUE TO POLLEN: Primary | ICD-10-CM

## 2018-04-12 PROCEDURE — 99499 UNLISTED E&M SERVICE: CPT | Mod: ,,, | Performed by: ALLERGY & IMMUNOLOGY

## 2018-04-12 PROCEDURE — 95117 IMMUNOTHERAPY INJECTIONS: CPT | Mod: ,,, | Performed by: ALLERGY & IMMUNOLOGY

## 2018-04-14 NOTE — PROGRESS NOTES
Immunotherapy Note: Allergy Shot      Subjective:       Patient ID: Doug Quiros is a 62 y.o. male presents for weekly immunotherapy  Tolerated last injection  No New Medications  Beta Blockers: not on beta blocker    Reaction with last injection?: No  Are you on any beta blockers?: No  Has the vial ?: No    Are you ill today?: No  Asthma exacerbation or symptoms: No  Do you have a fever today?: No    Peak Flow: 540    Expiration Date #1: 11/10/18  Vial Concentration: 1:1 v/v (RED)  Dose (mL) #1: 0.5 mL  Location: Right upper arm  Comment #1: rw    Expiration Date #2: 11/10/18  Vial Concentration: 1:1 v/v (RED)  Dose (mL) #2: 0.5 mL  Location: Left upper arm  Given By : rw                           Objective:     Doug Quiros observed for 30 minutes and discharged in good condition        Discussion:     Pt understands the current recommendation. All questions answered to the best of my ability. Continue current management plan.      Electronically signed by Karen Parker    Allergy/Imunology  [facility]  [place] [address]  [place] [address]   Office [phone]

## 2018-05-10 ENCOUNTER — CLINICAL SUPPORT (OUTPATIENT)
Dept: ALLERGY | Facility: CLINIC | Age: 63
End: 2018-05-10
Payer: COMMERCIAL

## 2018-05-10 VITALS
BODY MASS INDEX: 24.72 KG/M2 | SYSTOLIC BLOOD PRESSURE: 118 MMHG | HEIGHT: 72 IN | WEIGHT: 182.5 LBS | DIASTOLIC BLOOD PRESSURE: 80 MMHG

## 2018-05-10 DIAGNOSIS — J30.1 NON-SEASONAL ALLERGIC RHINITIS DUE TO POLLEN: Primary | ICD-10-CM

## 2018-05-10 PROCEDURE — 99499 UNLISTED E&M SERVICE: CPT | Mod: ,,, | Performed by: ALLERGY & IMMUNOLOGY

## 2018-05-10 PROCEDURE — 95117 IMMUNOTHERAPY INJECTIONS: CPT | Mod: ,,, | Performed by: ALLERGY & IMMUNOLOGY

## 2018-05-10 NOTE — PROGRESS NOTES
Immunotherapy Note: Allergy Shot      Subjective:       Patient ID: Doug Quiros is a 62 y.o. male presents for monthly immunotherapy  Tolerated last injection  No New Medications  Beta Blockers: not on beta blocker    Reaction with last injection?: No  Are you on any beta blockers?: No  Has the vial ?: No    Are you ill today?: No  Asthma exacerbation or symptoms: No  Do you have a fever today?: No    Peak Flow: 560    Expiration Date #1: 11/10/18  Vial Concentration: 1:1 v/v (RED)  Dose (mL) #1: 0.5 mL  Location: Right upper arm  Given By: AM    Expiration Date #2: 11/10/18  Vial Concentration: 1:1 v/v (RED)  Dose (mL) #2: 0.5 mL  Location: Right upper arm  Given By : AM                           Objective:     Doug Quiros observed for 30 minutes and discharged in good condition        Discussion:     Pt understands the current recommendation. All questions answered to the best of my ability. Continue current management plan.      Electronically signed by Karen Parker    Allergy/Imunology  [facility]  [place] [address]  [place] [address]   Office [phone]

## 2018-06-07 ENCOUNTER — CLINICAL SUPPORT (OUTPATIENT)
Dept: ALLERGY | Facility: CLINIC | Age: 63
End: 2018-06-07
Payer: COMMERCIAL

## 2018-06-07 VITALS
OXYGEN SATURATION: 98 % | HEART RATE: 71 BPM | WEIGHT: 175 LBS | BODY MASS INDEX: 23.7 KG/M2 | DIASTOLIC BLOOD PRESSURE: 78 MMHG | SYSTOLIC BLOOD PRESSURE: 126 MMHG | HEIGHT: 72 IN

## 2018-06-07 DIAGNOSIS — J30.1 NON-SEASONAL ALLERGIC RHINITIS DUE TO POLLEN: Primary | ICD-10-CM

## 2018-06-07 PROCEDURE — 95117 IMMUNOTHERAPY INJECTIONS: CPT | Mod: ,,, | Performed by: ALLERGY & IMMUNOLOGY

## 2018-06-07 PROCEDURE — 99499 UNLISTED E&M SERVICE: CPT | Mod: ,,, | Performed by: ALLERGY & IMMUNOLOGY

## 2018-06-07 NOTE — PROGRESS NOTES
Immunotherapy Note: Allergy Shot      Subjective:       Patient ID: Doug Quiros is a 62 y.o. male presents for monthly immunotherapy  Tolerated last injection  No New Medications  Beta Blockers: not on beta blocker    Reaction with last injection?: No  Are you on any beta blockers?: No  Has the vial ?: No    Are you ill today?: No  Asthma exacerbation or symptoms: No  Do you have a fever today?: No    Peak Flow: 580    Expiration Date #1: 11/10/18  Vial Concentration: 1:1 v/v (RED)  Dose (mL) #1: 0.5 mL  Location: Right upper arm  Given By: rw    Expiration Date #2: 11/10/18  Vial Concentration: 1:1 v/v (RED)  Dose (mL) #2: 0.5 mL  Location: Left upper arm  Given By : rw                           Objective:     Doug Quiros observed for 30 minutes and discharged in good condition        Discussion:     Pt understands the current recommendation. All questions answered to the best of my ability. Continue current management plan.      Electronically signed by Karen Parker    Allergy/Imunology  [facility]  [place] [address]  [place] [address]   Office [phone]

## 2018-07-12 ENCOUNTER — CLINICAL SUPPORT (OUTPATIENT)
Dept: ALLERGY | Facility: CLINIC | Age: 63
End: 2018-07-12
Payer: COMMERCIAL

## 2018-07-12 VITALS
DIASTOLIC BLOOD PRESSURE: 68 MMHG | HEIGHT: 72 IN | BODY MASS INDEX: 23.3 KG/M2 | WEIGHT: 172 LBS | SYSTOLIC BLOOD PRESSURE: 130 MMHG

## 2018-07-12 DIAGNOSIS — J30.1 NON-SEASONAL ALLERGIC RHINITIS DUE TO POLLEN: Primary | ICD-10-CM

## 2018-07-12 PROCEDURE — 99499 UNLISTED E&M SERVICE: CPT | Mod: ,,, | Performed by: ALLERGY & IMMUNOLOGY

## 2018-07-12 PROCEDURE — 95117 IMMUNOTHERAPY INJECTIONS: CPT | Mod: ,,, | Performed by: ALLERGY & IMMUNOLOGY

## 2018-07-12 NOTE — PROGRESS NOTES
Immunotherapy Note: Allergy Shot      Subjective:       Patient ID: Doug Quiros is a 62 y.o. male presents for monthly immunotherapy  Tolerated last injection  No New Medications  Beta Blockers: not on beta blocker    Reaction with last injection?: No  If female, are you pregnant?: No  Are you on any beta blockers?: No  Has the vial ?: No    Are you ill today?: No  Asthma exacerbation or symptoms: No  Do you have a fever today?: No    Peak Flow: 410    Expiration Date #1: 11/10/18  Vial Concentration: 1:1 v/v (RED)  Dose (mL) #1: 0.5 mL  Location: Right upper arm  Given By: AM    Expiration Date #2: 11/10/18  Vial Concentration: 1:1 v/v (RED)  Dose (mL) #2: 0.5 mL  Location: Left upper arm  Given By : AM                           Objective:     Doug Quiros observed for 30 minutes and discharged in good condition        Discussion:     Pt understands the current recommendation. All questions answered to the best of my ability. Continue current management plan.      Electronically signed by Karen Parker    Allergy/Imunology  [facility]  [place] [address]  [place] [address]   Office [phone]

## 2018-08-09 ENCOUNTER — CLINICAL SUPPORT (OUTPATIENT)
Dept: ALLERGY | Facility: CLINIC | Age: 63
End: 2018-08-09
Payer: COMMERCIAL

## 2018-08-09 VITALS
BODY MASS INDEX: 23.04 KG/M2 | WEIGHT: 170.13 LBS | SYSTOLIC BLOOD PRESSURE: 122 MMHG | DIASTOLIC BLOOD PRESSURE: 74 MMHG | HEIGHT: 72 IN

## 2018-08-09 DIAGNOSIS — J30.1 NON-SEASONAL ALLERGIC RHINITIS DUE TO POLLEN: Primary | ICD-10-CM

## 2018-08-09 PROCEDURE — 95117 IMMUNOTHERAPY INJECTIONS: CPT | Mod: ,,, | Performed by: ALLERGY & IMMUNOLOGY

## 2018-08-09 PROCEDURE — 99499 UNLISTED E&M SERVICE: CPT | Mod: ,,, | Performed by: ALLERGY & IMMUNOLOGY

## 2018-08-09 NOTE — PROGRESS NOTES
Immunotherapy Note: Allergy Shot      Subjective:       Patient ID: Doug Quiros is a 62 y.o. male presents for monthly immunotherapy  Tolerated last injection  No New Medications  Beta Blockers: not on beta blocker    Reaction with last injection?: No  If female, are you pregnant?: No  Are you on any beta blockers?: No  Has the vial ?: No    Are you ill today?: No  Asthma exacerbation or symptoms: No  Do you have a fever today?: No    Peak Flow: 550    Expiration Date #1: 11/10/18  Vial Concentration: 1:1 v/v (RED)  Dose (mL) #1: 0.5 mL  Location: Right upper arm  Given By: AM    Expiration Date #2: 11/10/18  Vial Concentration: 1:1 v/v (RED)  Dose (mL) #2: 0.5 mL  Location: Left upper arm  Given By : AM                           Objective:     Doug Quiros observed for 30 minutes and discharged in good condition        Discussion:     Pt understands the current recommendation. All questions answered to the best of my ability. Continue current management plan.      Electronically signed by Karen Parker    Allergy/Imunology  [facility]  [place] [address]  [place] [address]   Office [phone]

## 2018-09-06 ENCOUNTER — CLINICAL SUPPORT (OUTPATIENT)
Dept: ALLERGY | Facility: CLINIC | Age: 63
End: 2018-09-06
Payer: COMMERCIAL

## 2018-09-06 VITALS
SYSTOLIC BLOOD PRESSURE: 118 MMHG | HEIGHT: 72 IN | WEIGHT: 171 LBS | DIASTOLIC BLOOD PRESSURE: 76 MMHG | BODY MASS INDEX: 23.16 KG/M2

## 2018-09-06 DIAGNOSIS — J30.1 NON-SEASONAL ALLERGIC RHINITIS DUE TO POLLEN: Primary | ICD-10-CM

## 2018-09-06 PROCEDURE — 95117 IMMUNOTHERAPY INJECTIONS: CPT | Mod: ,,, | Performed by: ALLERGY & IMMUNOLOGY

## 2018-09-06 PROCEDURE — 99499 UNLISTED E&M SERVICE: CPT | Mod: ,,, | Performed by: ALLERGY & IMMUNOLOGY

## 2018-09-06 NOTE — PROGRESS NOTES
Immunotherapy Note: Allergy Shot      Subjective:       Patient ID: Doug Quiros is a 62 y.o. male presents for monthly immunotherapy  Tolerated last injection  No New Medications  Beta Blockers: not on beta blocker    Reaction with last injection?: Yes  If female, are you pregnant?: No  Are you on any beta blockers?: No  Has the vial ?: No    Are you ill today?: No  Asthma exacerbation or symptoms: No  Do you have a fever today?: No    Peak Flow: 590    Expiration Date #1: 11/10/18  Vial Concentration: 1:1 v/v (RED)  Dose (mL) #1: 0.5 mL  Location: Right upper arm  Given By: EL    Expiration Date #2: 11/10/18  Vial Concentration: 1:1 v/v (RED)  Dose (mL) #2: 0.5 mL  Location: Left upper arm  Given By : EL                           Objective:     Doug Quiros observed for 30 minutes and discharged in good condition        Discussion:     Pt understands the current recommendation. All questions answered to the best of my ability. Continue current management plan.      Electronically signed by Karen Parker    Allergy/Imunology  [facility]  [place] [address]  [place] [address]   Office [phone]

## 2018-10-04 ENCOUNTER — CLINICAL SUPPORT (OUTPATIENT)
Dept: ALLERGY | Facility: CLINIC | Age: 63
End: 2018-10-04
Payer: COMMERCIAL

## 2018-10-04 VITALS
OXYGEN SATURATION: 99 % | WEIGHT: 170 LBS | HEIGHT: 72 IN | HEART RATE: 79 BPM | DIASTOLIC BLOOD PRESSURE: 66 MMHG | BODY MASS INDEX: 23.03 KG/M2 | SYSTOLIC BLOOD PRESSURE: 120 MMHG

## 2018-10-04 DIAGNOSIS — J30.1 NON-SEASONAL ALLERGIC RHINITIS DUE TO POLLEN: Primary | ICD-10-CM

## 2018-10-04 PROCEDURE — 99499 UNLISTED E&M SERVICE: CPT | Mod: ,,, | Performed by: ALLERGY & IMMUNOLOGY

## 2018-10-04 PROCEDURE — 95117 IMMUNOTHERAPY INJECTIONS: CPT | Mod: ,,, | Performed by: ALLERGY & IMMUNOLOGY

## 2018-10-04 NOTE — PROGRESS NOTES
Immunotherapy Note: Allergy Shot      Subjective:       Patient ID: Doug Quiros is a 62 y.o. male presents for monthly immunotherapy  Tolerated last injection  No New Medications  Beta Blockers: not on beta blocker    Vial 1   1:1 0.5 mL  Exp 11/2018    Vial 2  1:1 0.5 mL  Exp: 11/2018                                                         Objective:     Doug Quiros observed for 30 minutes and discharged in good condition        Discussion:     Pt understands the current recommendation. All questions answered to the best of my ability. Continue current management plan.      Electronically signed by Karen Parker    Allergy/Imunology  [facility]  [address]  [place] [address]   Office [phone]

## 2018-11-01 ENCOUNTER — CLINICAL SUPPORT (OUTPATIENT)
Dept: ALLERGY | Facility: CLINIC | Age: 63
End: 2018-11-01
Payer: COMMERCIAL

## 2018-11-01 VITALS
HEIGHT: 72 IN | BODY MASS INDEX: 23.03 KG/M2 | WEIGHT: 170 LBS | DIASTOLIC BLOOD PRESSURE: 70 MMHG | SYSTOLIC BLOOD PRESSURE: 136 MMHG

## 2018-11-01 DIAGNOSIS — J30.1 NON-SEASONAL ALLERGIC RHINITIS DUE TO POLLEN: Primary | ICD-10-CM

## 2018-11-01 PROCEDURE — 99499 UNLISTED E&M SERVICE: CPT | Mod: ,,, | Performed by: ALLERGY & IMMUNOLOGY

## 2018-11-01 PROCEDURE — 95117 IMMUNOTHERAPY INJECTIONS: CPT | Mod: ,,, | Performed by: ALLERGY & IMMUNOLOGY

## 2018-11-01 RX ORDER — LEVOCETIRIZINE DIHYDROCHLORIDE 5 MG/1
5 TABLET, FILM COATED ORAL 2 TIMES DAILY
Qty: 60 TABLET | Refills: 3 | Status: SHIPPED | OUTPATIENT
Start: 2018-11-01 | End: 2019-09-12 | Stop reason: SDUPTHER

## 2018-11-01 NOTE — PROGRESS NOTES
Immunotherapy Note: Allergy Shot      Subjective:       Patient ID: Doug Quiros is a 62 y.o. male presents for monthly immunotherapy  Tolerated last injection  No New Medications  Beta Blockers: not on beta blocker      Reaction with last injection?: No  Are you on any beta blockers?: No  Has the vial ?: No    Are you ill today?: No  Do you have a fever today?: No    Peak Flow: 570    Expiration Date #1: 11/10/18  Vial Concentration: 1:1 v/v (RED)  Dose (mL) #1: 0.5 mL  Location: Right upper arm  Given By: EL    Expiration Date #2: 11/10/18  Vial Concentration: 1:1 v/v (RED)  Dose (mL) #2: 0.5 mL  Location: Left upper arm  Given By : EL                           Objective:     Doug Quiros observed for 30 minutes and discharged in good condition        Discussion:     Pt understands the current recommendation. All questions answered to the best of my ability. Continue current management plan.      Electronically signed by Fara Mcconnell    Allergy/Imunology  [facility]  [facility]  [address]  [place] [address]   Office [phone]

## 2018-11-29 ENCOUNTER — CLINICAL SUPPORT (OUTPATIENT)
Dept: ALLERGY | Facility: CLINIC | Age: 63
End: 2018-11-29
Payer: COMMERCIAL

## 2018-11-29 VITALS
HEART RATE: 72 BPM | OXYGEN SATURATION: 98 % | DIASTOLIC BLOOD PRESSURE: 70 MMHG | BODY MASS INDEX: 23.16 KG/M2 | SYSTOLIC BLOOD PRESSURE: 122 MMHG | WEIGHT: 171 LBS | HEIGHT: 72 IN

## 2018-11-29 DIAGNOSIS — J30.1 NON-SEASONAL ALLERGIC RHINITIS DUE TO POLLEN: ICD-10-CM

## 2018-11-29 PROCEDURE — 99499 UNLISTED E&M SERVICE: CPT | Mod: ,,, | Performed by: ALLERGY & IMMUNOLOGY

## 2018-11-29 PROCEDURE — 95117 IMMUNOTHERAPY INJECTIONS: CPT | Mod: ,,, | Performed by: ALLERGY & IMMUNOLOGY

## 2018-11-29 NOTE — PROGRESS NOTES
Immunotherapy Note: Allergy Shot      Subjective:       Patient ID: Doug Quiros is a 63 y.o. male presents for monthly immunotherapy  Tolerated last injection  No New Medications  Beta Blockers: not on beta blocker      Reaction with last injection?: No  Are you on any beta blockers?: No  Has the vial ?: No    Are you ill today?: No  Asthma exacerbation or symptoms: No  Do you have a fever today?: No    Peak Flow: 620    Expiration Date #1: 19  Vial Concentration: 1:1 v/v (RED)  Dose (mL) #1: 0.5 mL  Location: Right upper arm  Given By: EL    Expiration Date #2: 19  Vial Concentration: 1:1 v/v (RED)  Dose (mL) #2: 0.5 mL  Location: Left upper arm  Given By : EL                           Objective:     Doug Quiros observed for 30 minutes and discharged in good condition        Discussion:     Pt understands the current recommendation. All questions answered to the best of my ability. Continue current management plan.      Electronically signed by Fara Mcconnell    Allergy/Imunology  [facility]  [facility]  [address]  [place] [address]   Office [phone]

## 2018-12-27 ENCOUNTER — CLINICAL SUPPORT (OUTPATIENT)
Dept: ALLERGY | Facility: CLINIC | Age: 63
End: 2018-12-27
Payer: COMMERCIAL

## 2018-12-27 VITALS
BODY MASS INDEX: 23.3 KG/M2 | DIASTOLIC BLOOD PRESSURE: 80 MMHG | WEIGHT: 172 LBS | HEIGHT: 72 IN | SYSTOLIC BLOOD PRESSURE: 122 MMHG

## 2018-12-27 DIAGNOSIS — J30.1 NON-SEASONAL ALLERGIC RHINITIS DUE TO POLLEN: ICD-10-CM

## 2018-12-27 PROCEDURE — 99499 UNLISTED E&M SERVICE: CPT | Mod: ,,, | Performed by: ALLERGY & IMMUNOLOGY

## 2018-12-27 PROCEDURE — 95117 IMMUNOTHERAPY INJECTIONS: CPT | Mod: ,,, | Performed by: ALLERGY & IMMUNOLOGY

## 2018-12-27 NOTE — PROGRESS NOTES
Immunotherapy Note: Allergy Shot      Subjective:       Patient ID: Doug Quiros is a 63 y.o. male presents for monthly immunotherapy  Tolerated last injection  No New Medications  Beta Blockers: not on beta blocker      Reaction with last injection?: No  Are you on any beta blockers?: No  Has the vial ?: No    Are you ill today?: No  Asthma exacerbation or symptoms: No  Do you have a fever today?: No    Peak Flow: 550    Expiration Date #1: 19  Vial Concentration: 1:1 v/v (RED)  Dose (mL) #1: 0.5 mL  Location: Right upper arm  Given By: EL    Expiration Date #2: 19  Vial Concentration: 1:1 v/v (RED)  Dose (mL) #2: 0.5 mL  Location: Left upper arm  Given By : EL                           Objective:     Doug Quiros observed for 30 minutes and discharged in good condition        Discussion:     Pt understands the current recommendation. All questions answered to the best of my ability. Continue current management plan.      Electronically signed by Karen Parker    Allergy/Imunology  [facility]  [place] [address]  [place] [address]   Office [phone]

## 2019-01-31 ENCOUNTER — CLINICAL SUPPORT (OUTPATIENT)
Dept: ALLERGY | Facility: CLINIC | Age: 64
End: 2019-01-31
Payer: COMMERCIAL

## 2019-01-31 VITALS — OXYGEN SATURATION: 99 % | BODY MASS INDEX: 23.43 KG/M2 | HEART RATE: 81 BPM | WEIGHT: 173 LBS | HEIGHT: 72 IN

## 2019-01-31 DIAGNOSIS — J30.1 NON-SEASONAL ALLERGIC RHINITIS DUE TO POLLEN: ICD-10-CM

## 2019-01-31 PROCEDURE — 95117 PR IMMU2THERAPY, 2+ INJECTIONS: ICD-10-PCS | Mod: ,,, | Performed by: ALLERGY & IMMUNOLOGY

## 2019-01-31 PROCEDURE — 95117 IMMUNOTHERAPY INJECTIONS: CPT | Mod: ,,, | Performed by: ALLERGY & IMMUNOLOGY

## 2019-01-31 PROCEDURE — 99499 UNLISTED E&M SERVICE: CPT | Mod: ,,, | Performed by: ALLERGY & IMMUNOLOGY

## 2019-01-31 PROCEDURE — 99499 NO LOS: ICD-10-PCS | Mod: ,,, | Performed by: ALLERGY & IMMUNOLOGY

## 2019-01-31 NOTE — PROGRESS NOTES
Immunotherapy Note: Allergy Shot      Subjective:       Patient ID: Doug Quiros is a 63 y.o. male presents for monthly immunotherapy  Tolerated last injection  No New Medications  Beta Blockers: not on beta blocker      Reaction with last injection?: No  Are you on any beta blockers?: No  Has the vial ?: No    Are you ill today?: No  Asthma exacerbation or symptoms: No  Do you have a fever today?: No    Peak Flow: 640    Expiration Date #1: 19  Vial Concentration: 1:1 v/v (RED)  Dose (mL) #1: 0.5 mL  Location: Right upper arm  Given By: EL    Expiration Date #2: 19  Vial Concentration: 1:1 v/v (RED)  Dose (mL) #2: 0.5 mL  Location: Left upper arm  Given By : EL                           Objective:     Doug Quiros observed for 30 minutes and discharged in good condition        Discussion:     Pt understands the current recommendation. All questions answered to the best of my ability. Continue current management plan.      Electronically signed by Fara Mcconnell    Allergy/Imunology  [facility]  [facility]  [address]  [place] [address]   Office [phone]

## 2019-03-07 ENCOUNTER — CLINICAL SUPPORT (OUTPATIENT)
Dept: ALLERGY | Facility: CLINIC | Age: 64
End: 2019-03-07
Payer: COMMERCIAL

## 2019-03-07 VITALS
OXYGEN SATURATION: 96 % | DIASTOLIC BLOOD PRESSURE: 66 MMHG | HEART RATE: 74 BPM | BODY MASS INDEX: 23.57 KG/M2 | HEIGHT: 72 IN | WEIGHT: 174 LBS | SYSTOLIC BLOOD PRESSURE: 136 MMHG

## 2019-03-07 DIAGNOSIS — J30.1 NON-SEASONAL ALLERGIC RHINITIS DUE TO POLLEN: Primary | ICD-10-CM

## 2019-03-07 PROCEDURE — 99499 UNLISTED E&M SERVICE: CPT | Mod: ,,, | Performed by: ALLERGY & IMMUNOLOGY

## 2019-03-07 PROCEDURE — 95117 PR IMMU2THERAPY, 2+ INJECTIONS: ICD-10-PCS | Mod: ,,, | Performed by: ALLERGY & IMMUNOLOGY

## 2019-03-07 PROCEDURE — 95117 IMMUNOTHERAPY INJECTIONS: CPT | Mod: ,,, | Performed by: ALLERGY & IMMUNOLOGY

## 2019-03-07 PROCEDURE — 99499 NO LOS: ICD-10-PCS | Mod: ,,, | Performed by: ALLERGY & IMMUNOLOGY

## 2019-03-07 NOTE — PROGRESS NOTES
Immunotherapy Note: Allergy Shot      Subjective:       Patient ID: Doug Quiros is a 63 y.o. male presents for monthly immunotherapy  Tolerated last injection  No New Medications  Beta Blockers: not on beta blocker      Reaction with last injection?: No  If female, are you pregnant?: No  Are you on any beta blockers?: No  Has the vial ?: No    Are you ill today?: No  Asthma exacerbation or symptoms: No  Do you have a fever today?: No    Peak Flow: 600    Expiration Date #1: 19  Vial Concentration: 1:1 v/v (RED)  Dose (mL) #1: 0.5 mL  Location: Right upper arm  Given By: EL    Expiration Date #2: 19  Vial Concentration: 1:1 v/v (RED)  Dose (mL) #2: 0.5 mL  Location: Left upper arm  Given By : EL                           Objective:     Doug Quiros observed for 30 minutes and discharged in good condition        Discussion:     Pt understands the current recommendation. All questions answered to the best of my ability. Continue current management plan.      Electronically signed by Karen Parker    Allergy/Imunology  [facility]  [place] [address]  [place] [address]   Office [phone]

## 2019-03-14 ENCOUNTER — DOCUMENTATION ONLY (OUTPATIENT)
Dept: ALLERGY | Facility: CLINIC | Age: 64
End: 2019-03-14
Payer: COMMERCIAL

## 2019-03-14 DIAGNOSIS — J30.1 NON-SEASONAL ALLERGIC RHINITIS DUE TO POLLEN: ICD-10-CM

## 2019-03-14 PROCEDURE — 95165 PR PROFES SVC,IMMUNOTHER,SINGLE/MULT AGS: ICD-10-PCS | Mod: ,,, | Performed by: ALLERGY & IMMUNOLOGY

## 2019-03-14 PROCEDURE — 95165 ANTIGEN THERAPY SERVICES: CPT | Mod: ,,, | Performed by: ALLERGY & IMMUNOLOGY

## 2019-03-14 NOTE — PROGRESS NOTES
Using 70% alcohol clean surface was prepped. Sterile drape then covered clean space.  Hands were scrubbed and placed into sterile gloves. Gown, mask, and hair net worn according to  guidelines allergist exemption.    Mixed 3-    Vial 1 vial 2

## 2019-04-11 ENCOUNTER — CLINICAL SUPPORT (OUTPATIENT)
Dept: ALLERGY | Facility: CLINIC | Age: 64
End: 2019-04-11
Payer: COMMERCIAL

## 2019-04-11 VITALS — OXYGEN SATURATION: 98 % | HEART RATE: 78 BPM | HEIGHT: 72 IN | WEIGHT: 172 LBS | BODY MASS INDEX: 23.3 KG/M2

## 2019-04-11 DIAGNOSIS — J30.1 NON-SEASONAL ALLERGIC RHINITIS DUE TO POLLEN: ICD-10-CM

## 2019-04-11 PROCEDURE — 99499 NO LOS: ICD-10-PCS | Mod: ,,, | Performed by: ALLERGY & IMMUNOLOGY

## 2019-04-11 PROCEDURE — 99499 UNLISTED E&M SERVICE: CPT | Mod: ,,, | Performed by: ALLERGY & IMMUNOLOGY

## 2019-04-11 PROCEDURE — 95117 PR IMMU2THERAPY, 2+ INJECTIONS: ICD-10-PCS | Mod: ,,, | Performed by: ALLERGY & IMMUNOLOGY

## 2019-04-11 PROCEDURE — 95117 IMMUNOTHERAPY INJECTIONS: CPT | Mod: ,,, | Performed by: ALLERGY & IMMUNOLOGY

## 2019-04-11 NOTE — PROGRESS NOTES
Immunotherapy Note: Allergy Shot      Subjective:       Patient ID: Doug Quiros is a 63 y.o. male presents for monthly immunotherapy  Tolerated last injection  No New Medications  Beta Blockers: not on beta blocker      Reaction with last injection?: No  Are you on any beta blockers?: No  Has the vial ?: No    Are you ill today?: No  Asthma exacerbation or symptoms: No  Do you have a fever today?: No    Peak Flow: 610    Expiration Date #1: 20  Vial Concentration: 1:1 v/v (RED)  Dose (mL) #1: 0.4 mL(New Vial)  Location: Right upper arm  Given By: EL    Expiration Date #2: 20  Vial Concentration: 1:1 v/v (RED)  Dose (mL) #2: 0.4 mL(New Vial)  Location: Left upper arm  Given By : EL                           Objective:     Doug Quiros observed for 30 minutes and discharged in good condition        Discussion:     Pt understands the current recommendation. All questions answered to the best of my ability. Continue current management plan.      Electronically signed by Fara Mcconnell    Allergy/Imunology  [facility]  [facility]  [address]  [place] [address]   Office [phone]

## 2019-05-09 ENCOUNTER — CLINICAL SUPPORT (OUTPATIENT)
Dept: ALLERGY | Facility: CLINIC | Age: 64
End: 2019-05-09
Payer: COMMERCIAL

## 2019-05-09 ENCOUNTER — OFFICE VISIT (OUTPATIENT)
Dept: ORTHOPEDICS | Facility: CLINIC | Age: 64
End: 2019-05-09
Payer: COMMERCIAL

## 2019-05-09 VITALS
HEIGHT: 72 IN | SYSTOLIC BLOOD PRESSURE: 120 MMHG | DIASTOLIC BLOOD PRESSURE: 70 MMHG | BODY MASS INDEX: 23.16 KG/M2 | WEIGHT: 171 LBS | HEART RATE: 71 BPM

## 2019-05-09 VITALS — WEIGHT: 175 LBS | HEART RATE: 63 BPM | OXYGEN SATURATION: 99 % | BODY MASS INDEX: 23.7 KG/M2 | HEIGHT: 72 IN

## 2019-05-09 DIAGNOSIS — J30.1 NON-SEASONAL ALLERGIC RHINITIS DUE TO POLLEN: ICD-10-CM

## 2019-05-09 DIAGNOSIS — M75.41 IMPINGEMENT SYNDROME OF SHOULDER, RIGHT: Primary | ICD-10-CM

## 2019-05-09 PROCEDURE — 99203 PR OFFICE/OUTPT VISIT, NEW, LEVL III, 30-44 MIN: ICD-10-PCS | Mod: 25,,, | Performed by: ORTHOPAEDIC SURGERY

## 2019-05-09 PROCEDURE — 99499 UNLISTED E&M SERVICE: CPT | Mod: ,,, | Performed by: ALLERGY & IMMUNOLOGY

## 2019-05-09 PROCEDURE — 95117 PR IMMU2THERAPY, 2+ INJECTIONS: ICD-10-PCS | Mod: ,,, | Performed by: ALLERGY & IMMUNOLOGY

## 2019-05-09 PROCEDURE — 20610 LARGE JOINT ASPIRATION/INJECTION: R SUBACROMIAL BURSA: ICD-10-PCS | Mod: RT,,, | Performed by: ORTHOPAEDIC SURGERY

## 2019-05-09 PROCEDURE — 73030 PR  X-RAY SHOULDER 2+ VW: ICD-10-PCS | Mod: RT,,, | Performed by: ORTHOPAEDIC SURGERY

## 2019-05-09 PROCEDURE — 73030 X-RAY EXAM OF SHOULDER: CPT | Mod: RT,,, | Performed by: ORTHOPAEDIC SURGERY

## 2019-05-09 PROCEDURE — 95117 IMMUNOTHERAPY INJECTIONS: CPT | Mod: ,,, | Performed by: ALLERGY & IMMUNOLOGY

## 2019-05-09 PROCEDURE — 3008F BODY MASS INDEX DOCD: CPT | Mod: ,,, | Performed by: ORTHOPAEDIC SURGERY

## 2019-05-09 PROCEDURE — 99499 NO LOS: ICD-10-PCS | Mod: ,,, | Performed by: ALLERGY & IMMUNOLOGY

## 2019-05-09 PROCEDURE — 99203 OFFICE O/P NEW LOW 30 MIN: CPT | Mod: 25,,, | Performed by: ORTHOPAEDIC SURGERY

## 2019-05-09 PROCEDURE — 3008F PR BODY MASS INDEX (BMI) DOCUMENTED: ICD-10-PCS | Mod: ,,, | Performed by: ORTHOPAEDIC SURGERY

## 2019-05-09 PROCEDURE — 20610 DRAIN/INJ JOINT/BURSA W/O US: CPT | Mod: RT,,, | Performed by: ORTHOPAEDIC SURGERY

## 2019-05-09 RX ORDER — METHYLPREDNISOLONE ACETATE 40 MG/ML
40 INJECTION, SUSPENSION INTRA-ARTICULAR; INTRALESIONAL; INTRAMUSCULAR; SOFT TISSUE
Status: DISCONTINUED | OUTPATIENT
Start: 2019-05-09 | End: 2019-05-09 | Stop reason: HOSPADM

## 2019-05-09 RX ADMIN — METHYLPREDNISOLONE ACETATE 40 MG: 40 INJECTION, SUSPENSION INTRA-ARTICULAR; INTRALESIONAL; INTRAMUSCULAR; SOFT TISSUE at 12:05

## 2019-05-09 NOTE — PROGRESS NOTES
Immunotherapy Note: Allergy Shot      Subjective:       Patient ID: Faisal Quiros is a 63 y.o. male presents for monthly immunotherapy  Tolerated last injection  No New Medications  Beta Blockers: not on beta blocker      Reaction with last injection?: No  Are you on any beta blockers?: No  Has the vial ?: No    Are you ill today?: No  Asthma exacerbation or symptoms: No  Do you have a fever today?: No    Peak Flow: 570    Expiration Date #1: 20  Vial Concentration: 1:1 v/v (RED)  Dose (mL) #1: 0.5 mL  Location: Right upper arm  Given By: EL    Expiration Date #2: 20  Vial Concentration: 1:1 v/v (RED)  Dose (mL) #2: 0.5 mL  Location: Left upper arm  Given By : EL                           Objective:     Faisal Quiros observed for 30 minutes and discharged in good condition        Discussion:     Pt understands the current recommendation. All questions answered to the best of my ability. Continue current management plan.      Electronically signed by Fara Mcconnell    Allergy/Imunology  [facility]  [facility]  [address]  [place] [address]   Office [phone]

## 2019-05-09 NOTE — PROCEDURES
Large Joint Aspiration/Injection: R subacromial bursa  Date/Time: 5/9/2019 12:23 PM  Performed by: Stone Suarez MD  Authorized by: Stone Suarez MD     Consent Done?:  Yes (Verbal)  Indications:  Pain  Procedure site marked: Yes    Timeout: Prior to procedure the correct patient, procedure, and site was verified      Location:  Shoulder  Site:  R subacromial bursa  Prep: Patient was prepped and draped in usual sterile fashion    Needle size:  25 G  Medications:  40 mg methylPREDNISolone acetate 40 mg/mL; 40 mg methylPREDNISolone acetate 40 mg/mL  Patient tolerance:  Patient tolerated the procedure well with no immediate complications

## 2019-05-09 NOTE — PROGRESS NOTES
[facility]    Subjective:     Chief Complaint:   Chief Complaint   Patient presents with    Right Shoulder - Pain     Right shoulder pain x february. States that he was in the gym and states that he aggravated his shoulder. States that he has had shoulder issues in the past. States that his pain stay in his shoulder.        Past Medical History:   Diagnosis Date    Allergic rhinitis     Legionella pneumonia     Seborrhea-like dermatitis with psoriasiform elements        Past Surgical History:   Procedure Laterality Date    chest xray      ct scan      LUMBAR DISC SURGERY      pet scan      right shoulder surgery      SHOULDER ARTHROSCOPY      total proctocolectomy         Current Outpatient Medications   Medication Sig    levocetirizine (XYZAL) 5 MG tablet Take 1 tablet (5 mg total) by mouth 2 (two) times daily.     Current Facility-Administered Medications   Medication    Allergy Mix       Review of patient's allergies indicates:  No Known Allergies    Family History   Problem Relation Age of Onset    Allergic rhinitis Neg Hx     Allergies Neg Hx     Angioedema Neg Hx     Asthma Neg Hx     Eczema Neg Hx     Atopy Neg Hx     Immunodeficiency Neg Hx     Rhinitis Neg Hx     Urticaria Neg Hx        Social History     Socioeconomic History    Marital status:      Spouse name: Not on file    Number of children: Not on file    Years of education: Not on file    Highest education level: Not on file   Occupational History    Not on file   Social Needs    Financial resource strain: Not on file    Food insecurity:     Worry: Not on file     Inability: Not on file    Transportation needs:     Medical: Not on file     Non-medical: Not on file   Tobacco Use    Smoking status: Never Smoker    Smokeless tobacco: Never Used   Substance and Sexual Activity    Alcohol use: No    Drug use: No    Sexual activity: Not on file   Lifestyle    Physical activity:     Days per week: Not  on file     Minutes per session: Not on file    Stress: Not on file   Relationships    Social connections:     Talks on phone: Not on file     Gets together: Not on file     Attends Sabianism service: Not on file     Active member of club or organization: Not on file     Attends meetings of clubs or organizations: Not on file     Relationship status: Not on file   Other Topics Concern    Not on file   Social History Narrative    Not on file       History of present illness: Patient comes in today for the right shoulder. He's had shoulder pain for about 3 months. He initially had shoulder pain for many years ago and is actually status post subacromial decompression in 2005. He had done well with that for many years.      Review of Systems:    Constitution: Negative for chills, fever, and sweats.  Negative for unexplained weight loss.    HENT:  Negative for headaches and blurry vision.    Cardiovascular:Negative for chest pain or irregular heart beat. Negative for hypertension.    Respiratory:  Negative for cough and shortness of breath.    Gastrointestinal: Negative for abdominal pain, heartburn, melena, nausea, and vomitting.    Genitourinary:  Negative bladder incontinence and dysuria.    Musculoskeletal:  See HPI for details.     Neurological: Negative for numbness.    Psychiatric/Behavioral: Negative for depression.  The patient is not nervous/anxious.      Endocrine: Negative for polyuria    Hematologic/Lymphatic: Negative for bleeding problem.  Does not bruise/bleed easily.    Skin: Negative for poor would healing and rash    Objective:      Physical Examination:    Vital Signs:    Vitals:    05/09/19 1142   BP: 120/70   Pulse: 71       Body mass index is 23.19 kg/m².    This a well-developed, well nourished patient in no acute distress.  They are alert and oriented and cooperative to examination.        Patient has full range of motion of the right shoulder. Positive impingement. Positive crossover. His  rotator cuff is grossly intact. Full range of motion of the left shoulder without discomfort. Spurling sign is negative.  Pertinent New Results:    XRAY Report / Interpretation:   And lateral of the right shoulder demonstrates a type I acromion no fractures or subluxations.    Assessment/Plan:      Right shoulder impingement syndrome. I injected him with Depo-Medrol and lidocaine. I started him on physical therapy. Follow-up in one month      This note was created using Dragon voice recognition software that occasionally misinterpreted phrases or words.

## 2019-06-06 ENCOUNTER — CLINICAL SUPPORT (OUTPATIENT)
Dept: ALLERGY | Facility: CLINIC | Age: 64
End: 2019-06-06
Payer: COMMERCIAL

## 2019-06-06 VITALS — HEIGHT: 72 IN | HEART RATE: 80 BPM | BODY MASS INDEX: 22.89 KG/M2 | OXYGEN SATURATION: 98 % | WEIGHT: 169 LBS

## 2019-06-06 DIAGNOSIS — J30.1 NON-SEASONAL ALLERGIC RHINITIS DUE TO POLLEN: ICD-10-CM

## 2019-06-06 PROCEDURE — 99499 UNLISTED E&M SERVICE: CPT | Mod: ,,, | Performed by: ALLERGY & IMMUNOLOGY

## 2019-06-06 PROCEDURE — 95117 IMMUNOTHERAPY INJECTIONS: CPT | Mod: ,,, | Performed by: ALLERGY & IMMUNOLOGY

## 2019-06-06 PROCEDURE — 95117 PR IMMU2THERAPY, 2+ INJECTIONS: ICD-10-PCS | Mod: ,,, | Performed by: ALLERGY & IMMUNOLOGY

## 2019-06-06 PROCEDURE — 99499 NO LOS: ICD-10-PCS | Mod: ,,, | Performed by: ALLERGY & IMMUNOLOGY

## 2019-06-06 NOTE — PROGRESS NOTES
Immunotherapy Note: Allergy Shot      Subjective:       Patient ID: Faisal Quiros is a 63 y.o. male presents for monthly immunotherapy  Tolerated last injection  No New Medications  Beta Blockers: not on beta blocker      Reaction with last injection?: No  Are you on any beta blockers?: No  Has the vial ?: No    Are you ill today?: No  Asthma exacerbation or symptoms: No  Do you have a fever today?: No    Peak Flow: 640    Expiration Date #1: 20  Vial Concentration: 1:1 v/v (RED)  Dose (mL) #1: 0.5 mL  Location: Right upper arm  Given By: EL    Expiration Date #2: 20  Vial Concentration: 1:1 v/v (RED)  Dose (mL) #2: 0.5 mL  Location: Left upper arm  Given By : EL                           Objective:     Faisal Quiros observed for 30 minutes and discharged in good condition        Discussion:     Pt understands the current recommendation. All questions answered to the best of my ability. Continue current management plan.      Electronically signed by Fara Mcconnell    Allergy/Imunology  [facility]  [facility]  [address]  [place] [address]   Office [phone]

## 2019-07-02 ENCOUNTER — OFFICE VISIT (OUTPATIENT)
Dept: ORTHOPEDICS | Facility: CLINIC | Age: 64
End: 2019-07-02
Payer: COMMERCIAL

## 2019-07-02 VITALS
DIASTOLIC BLOOD PRESSURE: 70 MMHG | HEART RATE: 84 BPM | BODY MASS INDEX: 23.03 KG/M2 | HEIGHT: 72 IN | SYSTOLIC BLOOD PRESSURE: 110 MMHG | WEIGHT: 170 LBS

## 2019-07-02 DIAGNOSIS — M75.41 IMPINGEMENT SYNDROME OF SHOULDER, RIGHT: Primary | ICD-10-CM

## 2019-07-02 DIAGNOSIS — M75.101 TEAR OF RIGHT ROTATOR CUFF, UNSPECIFIED TEAR EXTENT: ICD-10-CM

## 2019-07-02 PROCEDURE — 3008F BODY MASS INDEX DOCD: CPT | Mod: ,,, | Performed by: ORTHOPAEDIC SURGERY

## 2019-07-02 PROCEDURE — 99213 OFFICE O/P EST LOW 20 MIN: CPT | Mod: ,,, | Performed by: ORTHOPAEDIC SURGERY

## 2019-07-02 PROCEDURE — 99213 PR OFFICE/OUTPT VISIT, EST, LEVL III, 20-29 MIN: ICD-10-PCS | Mod: ,,, | Performed by: ORTHOPAEDIC SURGERY

## 2019-07-02 PROCEDURE — 3008F PR BODY MASS INDEX (BMI) DOCUMENTED: ICD-10-PCS | Mod: ,,, | Performed by: ORTHOPAEDIC SURGERY

## 2019-07-02 NOTE — PROGRESS NOTES
[facility]    Subjective:     Chief Complaint:   Chief Complaint   Patient presents with    Right Shoulder - Pain     RT shoulder injection follow up 5.9.19. Injection did not help much. P.T has helped some but he still has soreness. Limited ROM       Past Medical History:   Diagnosis Date    Allergic rhinitis     Legionella pneumonia     Seborrhea-like dermatitis with psoriasiform elements        Past Surgical History:   Procedure Laterality Date    chest xray      ct scan      LUMBAR DISC SURGERY      pet scan      right shoulder surgery      SHOULDER ARTHROSCOPY      total proctocolectomy         Current Outpatient Medications   Medication Sig    levocetirizine (XYZAL) 5 MG tablet Take 1 tablet (5 mg total) by mouth 2 (two) times daily.     Current Facility-Administered Medications   Medication    Allergy Mix       Review of patient's allergies indicates:  No Known Allergies    Family History   Problem Relation Age of Onset    Allergic rhinitis Neg Hx     Allergies Neg Hx     Angioedema Neg Hx     Asthma Neg Hx     Eczema Neg Hx     Atopy Neg Hx     Immunodeficiency Neg Hx     Rhinitis Neg Hx     Urticaria Neg Hx        Social History     Socioeconomic History    Marital status:      Spouse name: Not on file    Number of children: Not on file    Years of education: Not on file    Highest education level: Not on file   Occupational History    Not on file   Social Needs    Financial resource strain: Not on file    Food insecurity:     Worry: Not on file     Inability: Not on file    Transportation needs:     Medical: Not on file     Non-medical: Not on file   Tobacco Use    Smoking status: Never Smoker    Smokeless tobacco: Never Used   Substance and Sexual Activity    Alcohol use: No    Drug use: No    Sexual activity: Not on file   Lifestyle    Physical activity:     Days per week: Not on file     Minutes per session: Not on file    Stress: Not on file    Relationships    Social connections:     Talks on phone: Not on file     Gets together: Not on file     Attends Congregational service: Not on file     Active member of club or organization: Not on file     Attends meetings of clubs or organizations: Not on file     Relationship status: Not on file   Other Topics Concern    Not on file   Social History Narrative    Not on file       History of present illness: Patient comes in today for the right shoulder. Continues to have significant right shoulder discomfort. He also complains primarily of weakness. That does not seem to be improving.      Review of Systems:    Constitution: Negative for chills, fever, and sweats.  Negative for unexplained weight loss.    HENT:  Negative for headaches and blurry vision.    Cardiovascular:Negative for chest pain or irregular heart beat. Negative for hypertension.    Respiratory:  Negative for cough and shortness of breath.    Gastrointestinal: Negative for abdominal pain, heartburn, melena, nausea, and vomitting.    Genitourinary:  Negative bladder incontinence and dysuria.    Musculoskeletal:  See HPI for details.     Neurological: Negative for numbness.    Psychiatric/Behavioral: Negative for depression.  The patient is not nervous/anxious.      Endocrine: Negative for polyuria    Hematologic/Lymphatic: Negative for bleeding problem.  Does not bruise/bleed easily.    Skin: Negative for poor would healing and rash    Objective:      Physical Examination:    Vital Signs:    Vitals:    07/02/19 0918   BP: 110/70   Pulse: 84       Body mass index is 23.06 kg/m².    This a well-developed, well nourished patient in no acute distress.  They are alert and oriented and cooperative to examination.        Patient has full range of motion of the right shoulder. His rotator cuff is significantly weaker on the right than the left. Spurling sign is negative. Significant weakness to external rotation.  Pertinent New Results:    XRAY Report /  Interpretation:       Assessment/Plan:      Persistent weakness of the right rotator cuff despite Depo-Medrol injection and therapy. I have ordered a MRI of the right shoulder to look at the rotator cuff. I will see him back with that information      This note was created using Dragon voice recognition software that occasionally misinterpreted phrases or words.

## 2019-07-11 ENCOUNTER — CLINICAL SUPPORT (OUTPATIENT)
Dept: ALLERGY | Facility: CLINIC | Age: 64
End: 2019-07-11
Payer: COMMERCIAL

## 2019-07-11 VITALS
WEIGHT: 170 LBS | SYSTOLIC BLOOD PRESSURE: 132 MMHG | HEIGHT: 72 IN | BODY MASS INDEX: 23.03 KG/M2 | DIASTOLIC BLOOD PRESSURE: 72 MMHG

## 2019-07-11 DIAGNOSIS — J30.1 NON-SEASONAL ALLERGIC RHINITIS DUE TO POLLEN: ICD-10-CM

## 2019-07-11 PROCEDURE — 95117 IMMUNOTHERAPY INJECTIONS: CPT | Mod: ,,, | Performed by: ALLERGY & IMMUNOLOGY

## 2019-07-11 PROCEDURE — 95117 PR IMMU2THERAPY, 2+ INJECTIONS: ICD-10-PCS | Mod: ,,, | Performed by: ALLERGY & IMMUNOLOGY

## 2019-07-11 NOTE — PROGRESS NOTES
Immunotherapy Note: Allergy Shot      Subjective:       Patient ID: Faisal Quiros is a 63 y.o. male presents for monthly immunotherapy  Tolerated last injection  No New Medications  Beta Blockers: not on beta blocker      Reaction with last injection?: No  Are you on any beta blockers?: No  Has the vial ?: No    Are you ill today?: No  Asthma exacerbation or symptoms: No  Do you have a fever today?: No    Peak Flow: 600    Expiration Date #1: 20  Vial Concentration: 1:1 v/v (RED)  Dose (mL) #1: 0.5 mL  Location: Right upper arm  Given By: EL    Expiration Date #2: 20  Vial Concentration: 1:1 v/v (RED)  Dose (mL) #2: 0.5 mL  Location: Left upper arm  Given By : EL                           Objective:     Faisal Quiros observed for 30 minutes and discharged in good condition        Discussion:     Pt understands the current recommendation. All questions answered to the best of my ability. Continue current management plan.      Electronically signed by Karen Parker    Allergy/Imunology  [facility]  [place] [address]  [place] [address]   Office [phone]

## 2019-07-23 ENCOUNTER — OFFICE VISIT (OUTPATIENT)
Dept: ORTHOPEDICS | Facility: CLINIC | Age: 64
End: 2019-07-23
Payer: COMMERCIAL

## 2019-07-23 VITALS
BODY MASS INDEX: 22.89 KG/M2 | HEIGHT: 72 IN | WEIGHT: 169 LBS | DIASTOLIC BLOOD PRESSURE: 62 MMHG | HEART RATE: 86 BPM | SYSTOLIC BLOOD PRESSURE: 130 MMHG

## 2019-07-23 DIAGNOSIS — M75.41 IMPINGEMENT SYNDROME OF SHOULDER, RIGHT: Primary | ICD-10-CM

## 2019-07-23 DIAGNOSIS — M75.101 TEAR OF RIGHT ROTATOR CUFF, UNSPECIFIED TEAR EXTENT: ICD-10-CM

## 2019-07-23 PROCEDURE — 3008F BODY MASS INDEX DOCD: CPT | Mod: ,,, | Performed by: ORTHOPAEDIC SURGERY

## 2019-07-23 PROCEDURE — 99213 OFFICE O/P EST LOW 20 MIN: CPT | Mod: ,,, | Performed by: ORTHOPAEDIC SURGERY

## 2019-07-23 PROCEDURE — 99213 PR OFFICE/OUTPT VISIT, EST, LEVL III, 20-29 MIN: ICD-10-PCS | Mod: ,,, | Performed by: ORTHOPAEDIC SURGERY

## 2019-07-23 PROCEDURE — 3008F PR BODY MASS INDEX (BMI) DOCUMENTED: ICD-10-PCS | Mod: ,,, | Performed by: ORTHOPAEDIC SURGERY

## 2019-07-23 NOTE — PROGRESS NOTES
[facility]    Subjective:     Chief Complaint:   Chief Complaint   Patient presents with    Right Shoulder - Pain     Right shoulder pain/MRI results 7.9.19. States that his shoulder is feeling some what better. States that PT is helping with the acute pain. States that he still has some weakness.        Past Medical History:   Diagnosis Date    Allergic rhinitis     Legionella pneumonia     Seborrhea-like dermatitis with psoriasiform elements        Past Surgical History:   Procedure Laterality Date    chest xray      ct scan      LUMBAR DISC SURGERY      pet scan      right shoulder surgery      SHOULDER ARTHROSCOPY      total proctocolectomy         Current Outpatient Medications   Medication Sig    levocetirizine (XYZAL) 5 MG tablet Take 1 tablet (5 mg total) by mouth 2 (two) times daily.     Current Facility-Administered Medications   Medication    Allergy Mix       Review of patient's allergies indicates:  No Known Allergies    Family History   Problem Relation Age of Onset    Allergic rhinitis Neg Hx     Allergies Neg Hx     Angioedema Neg Hx     Asthma Neg Hx     Eczema Neg Hx     Atopy Neg Hx     Immunodeficiency Neg Hx     Rhinitis Neg Hx     Urticaria Neg Hx        Social History     Socioeconomic History    Marital status:      Spouse name: Not on file    Number of children: Not on file    Years of education: Not on file    Highest education level: Not on file   Occupational History    Not on file   Social Needs    Financial resource strain: Not on file    Food insecurity:     Worry: Not on file     Inability: Not on file    Transportation needs:     Medical: Not on file     Non-medical: Not on file   Tobacco Use    Smoking status: Never Smoker    Smokeless tobacco: Never Used   Substance and Sexual Activity    Alcohol use: No    Drug use: No    Sexual activity: Not on file   Lifestyle    Physical activity:     Days per week: Not on file     Minutes  per session: Not on file    Stress: Not on file   Relationships    Social connections:     Talks on phone: Not on file     Gets together: Not on file     Attends Hindu service: Not on file     Active member of club or organization: Not on file     Attends meetings of clubs or organizations: Not on file     Relationship status: Not on file   Other Topics Concern    Not on file   Social History Narrative    Not on file       History of present illness: Patient comes in today for the right shoulder. Unfortunately continues to have significant pain in the right shoulder. Difficulty sleeping at night. Difficulty with overhead activity.      Review of Systems:    Constitution: Negative for chills, fever, and sweats.  Negative for unexplained weight loss.    HENT:  Negative for headaches and blurry vision.    Cardiovascular:Negative for chest pain or irregular heart beat. Negative for hypertension.    Respiratory:  Negative for cough and shortness of breath.    Gastrointestinal: Negative for abdominal pain, heartburn, melena, nausea, and vomitting.    Genitourinary:  Negative bladder incontinence and dysuria.    Musculoskeletal:  See HPI for details.     Neurological: Negative for numbness.    Psychiatric/Behavioral: Negative for depression.  The patient is not nervous/anxious.      Endocrine: Negative for polyuria    Hematologic/Lymphatic: Negative for bleeding problem.  Does not bruise/bleed easily.    Skin: Negative for poor would healing and rash    Objective:      Physical Examination:    Vital Signs:    Vitals:    07/23/19 0938   BP: 130/62   Pulse: 86       Body mass index is 22.92 kg/m².    This a well-developed, well nourished patient in no acute distress.  They are alert and oriented and cooperative to examination.        Patient has full range of motion the right shoulder. Positive impingement. Positive crossover. His rotator cuff is significantly weak in the left side. Spurling sign is negative. Full  range of motion of the left shoulder without difficulty.  Pertinent New Results:  MRI of the right shoulder demonstrates high-grade partial-thickness tearing of the rotator cuff. There is atrophy in the supraspinatus but most of the musculature is intact.  XRAY Report / Interpretation:       Assessment/Plan:      Rotator cuff tear, acromioclavicular arthrosis, impingement syndrome. He has failed extensive conservative therapy. He is miserable. I've offered him arthroscopy subacromial decompression, distal clavicle resection and probable rotator cuff repair. Risks and benefits of discussed in great detail. Understood and wished to proceed      This note was created using Dragon voice recognition software that occasionally misinterpreted phrases or words.

## 2019-07-31 ENCOUNTER — TELEPHONE (OUTPATIENT)
Dept: ORTHOPEDICS | Facility: CLINIC | Age: 64
End: 2019-07-31

## 2019-07-31 DIAGNOSIS — M75.101 NONTRAUMATIC TEAR OF ROTATOR CUFF, RIGHT: ICD-10-CM

## 2019-07-31 DIAGNOSIS — M75.101 TEAR OF RIGHT ROTATOR CUFF, UNSPECIFIED TEAR EXTENT: ICD-10-CM

## 2019-07-31 DIAGNOSIS — M75.41 IMPINGEMENT SYNDROME OF SHOULDER, RIGHT: Primary | ICD-10-CM

## 2019-07-31 DIAGNOSIS — M19.011 ARTHRITIS OF RIGHT SHOULDER REGION: ICD-10-CM

## 2019-07-31 RX ORDER — SODIUM CHLORIDE 9 MG/ML
INJECTION, SOLUTION INTRAVENOUS CONTINUOUS
Status: CANCELLED | OUTPATIENT
Start: 2019-07-31

## 2019-07-31 RX ORDER — MUPIROCIN 20 MG/G
OINTMENT TOPICAL
Status: CANCELLED | OUTPATIENT
Start: 2019-07-31

## 2019-08-11 PROBLEM — A48.1 LEGIONELLA PNEUMONIA: Status: ACTIVE | Noted: 2019-08-11

## 2019-08-12 ENCOUNTER — CLINICAL SUPPORT (OUTPATIENT)
Dept: ALLERGY | Facility: CLINIC | Age: 64
End: 2019-08-12
Payer: COMMERCIAL

## 2019-08-12 VITALS
SYSTOLIC BLOOD PRESSURE: 130 MMHG | HEIGHT: 72 IN | HEART RATE: 84 BPM | OXYGEN SATURATION: 99 % | DIASTOLIC BLOOD PRESSURE: 84 MMHG | WEIGHT: 172 LBS | BODY MASS INDEX: 23.3 KG/M2

## 2019-08-12 DIAGNOSIS — J30.1 NON-SEASONAL ALLERGIC RHINITIS DUE TO POLLEN: ICD-10-CM

## 2019-08-12 PROCEDURE — 95117 IMMUNOTHERAPY INJECTIONS: CPT | Mod: S$GLB,,, | Performed by: ALLERGY & IMMUNOLOGY

## 2019-08-12 PROCEDURE — 95117 PR IMMU2THERAPY, 2+ INJECTIONS: ICD-10-PCS | Mod: S$GLB,,, | Performed by: ALLERGY & IMMUNOLOGY

## 2019-08-12 NOTE — PROGRESS NOTES
Immunotherapy Note: Allergy Shot      Subjective:       Patient ID: Faisal Quiros is a 63 y.o. male presents for monthly immunotherapy  Tolerated last injection  No New Medications  Beta Blockers: not on beta blocker      Reaction with last injection?: No  If female, are you pregnant?: No  Are you on any beta blockers?: No  Has the vial ?: No    Are you ill today?: No  Asthma exacerbation or symptoms: No  Do you have a fever today?: No    Peak Flow: 680    Expiration Date #1: 20  Vial Concentration: 1:1 v/v (RED)  Dose (mL) #1: 0.5 mL  Location: Right upper arm  Given By: EL    Expiration Date #2: 20  Vial Concentration: 1:1 v/v (RED)  Dose (mL) #2: 0.5 mL  Location: Left upper arm  Given By : EL                           Objective:     Faisal Quiros observed for 30 minutes and discharged in good condition        Discussion:     Pt understands the current recommendation. All questions answered to the best of my ability. Continue current management plan.      Electronically signed by Karen Parker    Allergy/Imunology  [facility]  [place] [address] [facility]. [address]  [place] [address]   Office [phone]

## 2019-08-21 ENCOUNTER — HOSPITAL ENCOUNTER (OUTPATIENT)
Dept: PREADMISSION TESTING | Facility: HOSPITAL | Age: 64
Discharge: HOME | End: 2019-08-21
Attending: ORTHOPAEDIC SURGERY
Payer: COMMERCIAL

## 2019-08-21 ENCOUNTER — LAB VISIT (OUTPATIENT)
Dept: LAB | Facility: HOSPITAL | Age: 64
End: 2019-08-21
Attending: ORTHOPAEDIC SURGERY
Payer: COMMERCIAL

## 2019-08-21 ENCOUNTER — HOSPITAL ENCOUNTER (OUTPATIENT)
Dept: RADIOLOGY | Facility: HOSPITAL | Age: 64
Discharge: HOME | End: 2019-08-21
Attending: ORTHOPAEDIC SURGERY
Payer: COMMERCIAL

## 2019-08-21 VITALS
SYSTOLIC BLOOD PRESSURE: 128 MMHG | DIASTOLIC BLOOD PRESSURE: 79 MMHG | WEIGHT: 170.63 LBS | OXYGEN SATURATION: 98 % | BODY MASS INDEX: 23.11 KG/M2 | TEMPERATURE: 98 F | RESPIRATION RATE: 17 BRPM | HEIGHT: 72 IN | HEART RATE: 54 BPM

## 2019-08-21 DIAGNOSIS — M75.41 IMPINGEMENT SYNDROME OF SHOULDER, RIGHT: ICD-10-CM

## 2019-08-21 DIAGNOSIS — M19.011 ARTHRITIS OF RIGHT SHOULDER REGION: ICD-10-CM

## 2019-08-21 DIAGNOSIS — M75.41 IMPINGEMENT SYNDROME OF SHOULDER, RIGHT: Primary | ICD-10-CM

## 2019-08-21 DIAGNOSIS — M75.101 TEAR OF RIGHT ROTATOR CUFF, UNSPECIFIED TEAR EXTENT: ICD-10-CM

## 2019-08-21 DIAGNOSIS — Z01.818 PREOP EXAMINATION: ICD-10-CM

## 2019-08-21 LAB
ALBUMIN SERPL BCP-MCNC: 3.9 G/DL (ref 3.5–5.2)
ALP SERPL-CCNC: 75 U/L (ref 55–135)
ALT SERPL W/O P-5'-P-CCNC: 24 U/L (ref 10–44)
ANION GAP SERPL CALC-SCNC: 5 MMOL/L (ref 8–16)
AST SERPL-CCNC: 25 U/L (ref 10–40)
BASOPHILS # BLD AUTO: 0.02 K/UL (ref 0–0.2)
BASOPHILS NFR BLD: 0.4 % (ref 0–1.9)
BILIRUB SERPL-MCNC: 1.1 MG/DL (ref 0.1–1)
BUN SERPL-MCNC: 18 MG/DL (ref 8–23)
CALCIUM SERPL-MCNC: 9.1 MG/DL (ref 8.7–10.5)
CHLORIDE SERPL-SCNC: 104 MMOL/L (ref 95–110)
CO2 SERPL-SCNC: 29 MMOL/L (ref 23–29)
CREAT SERPL-MCNC: 0.7 MG/DL (ref 0.5–1.4)
DIFFERENTIAL METHOD: ABNORMAL
EOSINOPHIL # BLD AUTO: 0 K/UL (ref 0–0.5)
EOSINOPHIL NFR BLD: 0.8 % (ref 0–8)
ERYTHROCYTE [DISTWIDTH] IN BLOOD BY AUTOMATED COUNT: 11.9 % (ref 11.5–14.5)
EST. GFR  (AFRICAN AMERICAN): >60 ML/MIN/1.73 M^2
EST. GFR  (NON AFRICAN AMERICAN): >60 ML/MIN/1.73 M^2
GLUCOSE SERPL-MCNC: 91 MG/DL (ref 70–110)
HCT VFR BLD AUTO: 42.1 % (ref 40–54)
HGB BLD-MCNC: 13.7 G/DL (ref 14–18)
IMM GRANULOCYTES # BLD AUTO: 0.02 K/UL (ref 0–0.04)
IMM GRANULOCYTES NFR BLD AUTO: 0.4 % (ref 0–0.5)
LYMPHOCYTES # BLD AUTO: 1.3 K/UL (ref 1–4.8)
LYMPHOCYTES NFR BLD: 27.3 % (ref 18–48)
MCH RBC QN AUTO: 32.7 PG (ref 27–31)
MCHC RBC AUTO-ENTMCNC: 32.5 G/DL (ref 32–36)
MCV RBC AUTO: 101 FL (ref 82–98)
MONOCYTES # BLD AUTO: 0.4 K/UL (ref 0.3–1)
MONOCYTES NFR BLD: 8.4 % (ref 4–15)
NEUTROPHILS # BLD AUTO: 3.1 K/UL (ref 1.8–7.7)
NEUTROPHILS NFR BLD: 62.7 % (ref 38–73)
NRBC BLD-RTO: 0 /100 WBC
PLATELET # BLD AUTO: 234 K/UL (ref 150–350)
PMV BLD AUTO: 9.3 FL (ref 9.2–12.9)
POTASSIUM SERPL-SCNC: 3.9 MMOL/L (ref 3.5–5.1)
PROT SERPL-MCNC: 7 G/DL (ref 6–8.4)
RBC # BLD AUTO: 4.19 M/UL (ref 4.6–6.2)
SODIUM SERPL-SCNC: 138 MMOL/L (ref 136–145)
WBC # BLD AUTO: 4.87 K/UL (ref 3.9–12.7)

## 2019-08-21 PROCEDURE — 85025 COMPLETE CBC W/AUTO DIFF WBC: CPT

## 2019-08-21 PROCEDURE — 93005 ELECTROCARDIOGRAM TRACING: CPT

## 2019-08-21 PROCEDURE — 80053 COMPREHEN METABOLIC PANEL: CPT

## 2019-08-21 PROCEDURE — 36415 COLL VENOUS BLD VENIPUNCTURE: CPT

## 2019-08-21 PROCEDURE — 71046 X-RAY EXAM CHEST 2 VIEWS: CPT | Mod: TC

## 2019-08-21 NOTE — DISCHARGE INSTRUCTIONS
Incision Care  Remember: Follow-up visits allow your healthcare provider to make sure your incision is healing well. Be sure to keep your appointments.     Stitches (sutures), surgical staples, special strips of surgical tape, or surgical skin glue may be used to close incisions. They also help stop bleeding and speed healing. To help your incision heal, follow the tips on this handout.  Home care  Tips for home care include the following:  · Always wash your hands before touching your incision.  · Keep your incision clean and dry.  · Avoid doing things that could cause dirt or sweat to get on your incision.  · Dont pick at scabs. They help protect the wound.  · Keep your incision out of water.  · Take a sponge bath to avoid getting your incision wet, unless your healthcare provider tells you otherwise.  · Ask your provider when can you take a shower or bathe.  · Ask your provider about the best way to keep your incision dry when bathing or showering.  · Pat stitches dry if they get wet. Dont rub.  · Leave the bandage (dressing) in place until you are told to remove it or change it. Change it only as directed, using clean hands.  · After the first 12 hours, change your dressing every 24 hours, or as directed by your healthcare provider.  · Change your dressing if it gets wet or soiled.  Care for specific closures  Follow these guidelines unless your healthcare provider tells you otherwise:  · Stitches or staples. Once you no longer need to keep these dry, clean the wound daily. First remove the bandage using clean hands. Then wash the area gently with soap and warm water. Use a wet cotton swab to loosen and remove any blood or crust that forms. After cleaning, put a thin layer of antibiotic ointment on. Then put on a new bandage.  · Skin glue. Dont put liquid, ointment, or cream on your wound while the glue is in place. Avoid activities that cause heavy sweating. Protect the wound from sunlight. Do not scratch,  rub, or pick at the glue. Do not put tape directly over the glue. The glue should peel off within 5 to 10 days.  · Surgical tape. Keep the area dry. If it gets wet, blot the area dry with a clean towel. Surgical tape usually falls off within 7 to 10 days. If it has not fallen off after 10 days, contact your healthcare provider before taking it off yourself. If you are told to remove the tape, put mineral oil or petroleum jelly on a cotton ball. Gently rub the tape until it is removed.  Changing your dressing  Leave the dressing (bandage) in place until you are told to remove it or change it. Follow the instructions below unless told otherwise by your healthcare provider:  · Always wash your hands before changing your dressing.  · After the first 48 hours, the incision wound usually will have closed. At this point, leave the incision uncovered and open to the air. If the incision has not closed keep it covered.  · Cover your incision only if your clothing is rubbing it or causing irritation.  · Change your dressing if it gets wet or soiled.  Follow-up care  Follow up with your healthcare provider to ask how long sutures or staples should be left in place. Be sure to return for stitch or staple removal as directed. If dissolving stitches were used in your mouth, these will not need to be removed. They should fall out or dissolve on their own.  If tape closures were used, remove them yourself when your provider recommends if they have not fallen off on their own. If skin glue was used, the glue will wear off by itself.  When to seek medical care  Call your healthcare provider if you have any of the following:  · More pain, redness, swelling, bleeding, or foul-smelling discharge around the incision area  · Fever of 100.4°F (38ºC) or higher, or as directed by your healthcare provider  · Shaking chills  · Vomiting or nausea that doesn't go away  · Numbness, coldness, or tingling around the incision area, or changes in  skin color  · Opening of the sutures or wound  · Stitches or staples come apart or fall out or surgical tape falls off before 7 days or as directed by your healthcare provider   Date Last Reviewed: 12/1/2016  © 7718-0837 [employer]. [address] [place] [address], [place] [address]. All rights reserved. This information is not intended as a substitute for professional medical care. Always follow your healthcare professional's instructions.

## 2019-08-28 ENCOUNTER — ANESTHESIA EVENT (OUTPATIENT)
Dept: SURGERY | Facility: HOSPITAL | Age: 64
End: 2019-08-28
Payer: COMMERCIAL

## 2019-08-28 ENCOUNTER — HOSPITAL ENCOUNTER (OUTPATIENT)
Facility: HOSPITAL | Age: 64
Discharge: HOME | End: 2019-08-28
Attending: ORTHOPAEDIC SURGERY | Admitting: ORTHOPAEDIC SURGERY
Payer: COMMERCIAL

## 2019-08-28 ENCOUNTER — ANESTHESIA (OUTPATIENT)
Dept: SURGERY | Facility: HOSPITAL | Age: 64
End: 2019-08-28
Payer: COMMERCIAL

## 2019-08-28 VITALS
TEMPERATURE: 98 F | RESPIRATION RATE: 16 BRPM | DIASTOLIC BLOOD PRESSURE: 84 MMHG | HEART RATE: 58 BPM | OXYGEN SATURATION: 100 % | SYSTOLIC BLOOD PRESSURE: 142 MMHG

## 2019-08-28 DIAGNOSIS — M75.101 TEAR OF RIGHT ROTATOR CUFF, UNSPECIFIED TEAR EXTENT: ICD-10-CM

## 2019-08-28 DIAGNOSIS — M75.101 NONTRAUMATIC TEAR OF ROTATOR CUFF, RIGHT: Primary | ICD-10-CM

## 2019-08-28 DIAGNOSIS — M75.41 IMPINGEMENT SYNDROME OF SHOULDER, RIGHT: ICD-10-CM

## 2019-08-28 DIAGNOSIS — M19.011 ARTHRITIS OF RIGHT SHOULDER REGION: ICD-10-CM

## 2019-08-28 PROCEDURE — 71000015 HC POSTOP RECOV 1ST HR: Performed by: ORTHOPAEDIC SURGERY

## 2019-08-28 PROCEDURE — 37000008 HC ANESTHESIA 1ST 15 MINUTES: Performed by: ORTHOPAEDIC SURGERY

## 2019-08-28 PROCEDURE — 25000003 PHARM REV CODE 250: Performed by: NURSE ANESTHETIST, CERTIFIED REGISTERED

## 2019-08-28 PROCEDURE — S0020 INJECTION, BUPIVICAINE HYDRO: HCPCS | Performed by: ANESTHESIOLOGY

## 2019-08-28 PROCEDURE — 27202103: Performed by: ANESTHESIOLOGY

## 2019-08-28 PROCEDURE — 27000673 HC TUBING BLOOD Y: Performed by: ANESTHESIOLOGY

## 2019-08-28 PROCEDURE — 25000003 PHARM REV CODE 250: Performed by: ANESTHESIOLOGY

## 2019-08-28 PROCEDURE — 27200664 HC NERVE BLOCK COMPLETE KIT: Performed by: ANESTHESIOLOGY

## 2019-08-28 PROCEDURE — 27000656 HC EYE GOGGLES: Performed by: ANESTHESIOLOGY

## 2019-08-28 PROCEDURE — 25000003 PHARM REV CODE 250: Performed by: ORTHOPAEDIC SURGERY

## 2019-08-28 PROCEDURE — 27202105 HC BIS BILATERAL SENSOR: Performed by: ANESTHESIOLOGY

## 2019-08-28 PROCEDURE — 36000710: Performed by: ORTHOPAEDIC SURGERY

## 2019-08-28 PROCEDURE — 27000080 OPTIME MED/SURG SUP & DEVICES GENERAL CLASSIFICATION: Performed by: ORTHOPAEDIC SURGERY

## 2019-08-28 PROCEDURE — 63600175 PHARM REV CODE 636 W HCPCS: Performed by: NURSE ANESTHETIST, CERTIFIED REGISTERED

## 2019-08-28 PROCEDURE — 37000009 HC ANESTHESIA EA ADD 15 MINS: Performed by: ORTHOPAEDIC SURGERY

## 2019-08-28 PROCEDURE — 63600175 PHARM REV CODE 636 W HCPCS: Performed by: ORTHOPAEDIC SURGERY

## 2019-08-28 PROCEDURE — 71000033 HC RECOVERY, INTIAL HOUR: Performed by: ORTHOPAEDIC SURGERY

## 2019-08-28 PROCEDURE — 27201423 OPTIME MED/SURG SUP & DEVICES STERILE SUPPLY: Performed by: ORTHOPAEDIC SURGERY

## 2019-08-28 PROCEDURE — 36000711: Performed by: ORTHOPAEDIC SURGERY

## 2019-08-28 PROCEDURE — 71000016 HC POSTOP RECOV ADDL HR: Performed by: ORTHOPAEDIC SURGERY

## 2019-08-28 PROCEDURE — 27000671 HC TUBING MICROBORE EXT: Performed by: ANESTHESIOLOGY

## 2019-08-28 PROCEDURE — S0028 INJECTION, FAMOTIDINE, 20 MG: HCPCS | Performed by: ANESTHESIOLOGY

## 2019-08-28 PROCEDURE — 27201107 HC STYLET, STANDARD: Performed by: ANESTHESIOLOGY

## 2019-08-28 PROCEDURE — 27000654 HC CATH IV JELCO: Performed by: ANESTHESIOLOGY

## 2019-08-28 RX ORDER — LIDOCAINE HYDROCHLORIDE 20 MG/ML
INJECTION, SOLUTION EPIDURAL; INFILTRATION; INTRACAUDAL; PERINEURAL
Status: DISCONTINUED | OUTPATIENT
Start: 2019-08-28 | End: 2019-08-28

## 2019-08-28 RX ORDER — MUPIROCIN 20 MG/G
OINTMENT TOPICAL
Status: DISCONTINUED | OUTPATIENT
Start: 2019-08-28 | End: 2019-08-28 | Stop reason: HOSPADM

## 2019-08-28 RX ORDER — SUCCINYLCHOLINE CHLORIDE 20 MG/ML
INJECTION INTRAMUSCULAR; INTRAVENOUS
Status: DISCONTINUED | OUTPATIENT
Start: 2019-08-28 | End: 2019-08-28

## 2019-08-28 RX ORDER — MUPIROCIN 20 MG/G
1 OINTMENT TOPICAL 2 TIMES DAILY
Status: CANCELLED | OUTPATIENT
Start: 2019-08-28 | End: 2019-09-02

## 2019-08-28 RX ORDER — ONDANSETRON 2 MG/ML
INJECTION INTRAMUSCULAR; INTRAVENOUS
Status: DISCONTINUED | OUTPATIENT
Start: 2019-08-28 | End: 2019-08-28

## 2019-08-28 RX ORDER — HYDROCODONE BITARTRATE AND ACETAMINOPHEN 5; 325 MG/1; MG/1
1 TABLET ORAL EVERY 4 HOURS PRN
Status: CANCELLED | OUTPATIENT
Start: 2019-08-28

## 2019-08-28 RX ORDER — BUPIVACAINE HYDROCHLORIDE 5 MG/ML
INJECTION, SOLUTION EPIDURAL; INTRACAUDAL
Status: DISCONTINUED | OUTPATIENT
Start: 2019-08-28 | End: 2019-08-28

## 2019-08-28 RX ORDER — EPINEPHRINE 1 MG/ML
INJECTION, SOLUTION INTRACARDIAC; INTRAMUSCULAR; INTRAVENOUS; SUBCUTANEOUS
Status: DISCONTINUED | OUTPATIENT
Start: 2019-08-28 | End: 2019-08-28 | Stop reason: HOSPADM

## 2019-08-28 RX ORDER — BUPIVACAINE HYDROCHLORIDE AND EPINEPHRINE 5; 5 MG/ML; UG/ML
INJECTION, SOLUTION EPIDURAL; INTRACAUDAL; PERINEURAL
Status: DISCONTINUED | OUTPATIENT
Start: 2019-08-28 | End: 2019-08-28 | Stop reason: HOSPADM

## 2019-08-28 RX ORDER — SODIUM CHLORIDE 0.9 % (FLUSH) 0.9 %
10 SYRINGE (ML) INJECTION
Status: DISCONTINUED | OUTPATIENT
Start: 2019-08-28 | End: 2019-08-28 | Stop reason: HOSPADM

## 2019-08-28 RX ORDER — ACETAMINOPHEN 500 MG
1000 TABLET ORAL ONCE
Status: COMPLETED | OUTPATIENT
Start: 2019-08-28 | End: 2019-08-28

## 2019-08-28 RX ORDER — ROCURONIUM BROMIDE 10 MG/ML
INJECTION, SOLUTION INTRAVENOUS
Status: DISCONTINUED | OUTPATIENT
Start: 2019-08-28 | End: 2019-08-28

## 2019-08-28 RX ORDER — PROPOFOL 10 MG/ML
VIAL (ML) INTRAVENOUS
Status: DISCONTINUED | OUTPATIENT
Start: 2019-08-28 | End: 2019-08-28

## 2019-08-28 RX ORDER — FAMOTIDINE 20 MG/50ML
20 INJECTION, SOLUTION INTRAVENOUS ONCE
Status: DISCONTINUED | OUTPATIENT
Start: 2019-08-28 | End: 2019-08-28 | Stop reason: HOSPADM

## 2019-08-28 RX ORDER — DIPHENHYDRAMINE HYDROCHLORIDE 50 MG/ML
25 INJECTION INTRAMUSCULAR; INTRAVENOUS EVERY 6 HOURS PRN
Status: DISCONTINUED | OUTPATIENT
Start: 2019-08-28 | End: 2019-08-28 | Stop reason: HOSPADM

## 2019-08-28 RX ORDER — HYDROMORPHONE HYDROCHLORIDE 1 MG/ML
0.2 INJECTION, SOLUTION INTRAMUSCULAR; INTRAVENOUS; SUBCUTANEOUS EVERY 5 MIN PRN
Status: DISCONTINUED | OUTPATIENT
Start: 2019-08-28 | End: 2019-08-28 | Stop reason: HOSPADM

## 2019-08-28 RX ORDER — FAMOTIDINE 20 MG/50ML
20 INJECTION, SOLUTION INTRAVENOUS 2 TIMES DAILY
Status: DISCONTINUED | OUTPATIENT
Start: 2019-08-28 | End: 2019-08-28

## 2019-08-28 RX ORDER — FAMOTIDINE 10 MG/ML
20 INJECTION INTRAVENOUS 2 TIMES DAILY
Status: DISCONTINUED | OUTPATIENT
Start: 2019-08-28 | End: 2019-08-28

## 2019-08-28 RX ORDER — ONDANSETRON 2 MG/ML
4 INJECTION INTRAMUSCULAR; INTRAVENOUS DAILY PRN
Status: DISCONTINUED | OUTPATIENT
Start: 2019-08-28 | End: 2019-08-28 | Stop reason: HOSPADM

## 2019-08-28 RX ORDER — MIDAZOLAM HYDROCHLORIDE 1 MG/ML
INJECTION INTRAMUSCULAR; INTRAVENOUS
Status: DISCONTINUED | OUTPATIENT
Start: 2019-08-28 | End: 2019-08-28

## 2019-08-28 RX ORDER — OXYCODONE HYDROCHLORIDE 5 MG/1
5 TABLET ORAL
Status: DISCONTINUED | OUTPATIENT
Start: 2019-08-28 | End: 2019-08-28 | Stop reason: HOSPADM

## 2019-08-28 RX ORDER — SODIUM CHLORIDE, SODIUM LACTATE, POTASSIUM CHLORIDE, CALCIUM CHLORIDE 600; 310; 30; 20 MG/100ML; MG/100ML; MG/100ML; MG/100ML
INJECTION, SOLUTION INTRAVENOUS CONTINUOUS PRN
Status: DISCONTINUED | OUTPATIENT
Start: 2019-08-28 | End: 2019-08-28

## 2019-08-28 RX ORDER — MEPERIDINE HYDROCHLORIDE 50 MG/ML
12.5 INJECTION INTRAMUSCULAR; INTRAVENOUS; SUBCUTANEOUS EVERY 10 MIN PRN
Status: DISCONTINUED | OUTPATIENT
Start: 2019-08-28 | End: 2019-08-28 | Stop reason: HOSPADM

## 2019-08-28 RX ORDER — SODIUM CHLORIDE 9 MG/ML
INJECTION, SOLUTION INTRAVENOUS CONTINUOUS
Status: DISCONTINUED | OUTPATIENT
Start: 2019-08-28 | End: 2019-08-28 | Stop reason: HOSPADM

## 2019-08-28 RX ORDER — METHYLPREDNISOLONE ACETATE 80 MG/ML
INJECTION, SUSPENSION INTRA-ARTICULAR; INTRALESIONAL; INTRAMUSCULAR; SOFT TISSUE
Status: DISCONTINUED | OUTPATIENT
Start: 2019-08-28 | End: 2019-08-28 | Stop reason: HOSPADM

## 2019-08-28 RX ORDER — OXYCODONE AND ACETAMINOPHEN 7.5; 325 MG/1; MG/1
1 TABLET ORAL EVERY 4 HOURS PRN
Qty: 30 TABLET | Refills: 0 | Status: CANCELLED
Start: 2019-08-28

## 2019-08-28 RX ORDER — FAMOTIDINE 10 MG/ML
20 INJECTION INTRAVENOUS ONCE
Status: DISCONTINUED | OUTPATIENT
Start: 2019-08-28 | End: 2019-08-28

## 2019-08-28 RX ORDER — CEFAZOLIN SODIUM 2 G/50ML
2 SOLUTION INTRAVENOUS
Status: COMPLETED | OUTPATIENT
Start: 2019-08-28 | End: 2019-08-28

## 2019-08-28 RX ORDER — DIPHENHYDRAMINE HYDROCHLORIDE 50 MG/ML
INJECTION INTRAMUSCULAR; INTRAVENOUS
Status: DISCONTINUED | OUTPATIENT
Start: 2019-08-28 | End: 2019-08-28

## 2019-08-28 RX ORDER — FENTANYL CITRATE 50 UG/ML
INJECTION, SOLUTION INTRAMUSCULAR; INTRAVENOUS
Status: DISCONTINUED | OUTPATIENT
Start: 2019-08-28 | End: 2019-08-28

## 2019-08-28 RX ADMIN — DIPHENHYDRAMINE HYDROCHLORIDE 12.5 MG: 50 INJECTION, SOLUTION INTRAMUSCULAR; INTRAVENOUS at 08:08

## 2019-08-28 RX ADMIN — SUCCINYLCHOLINE CHLORIDE 120 MG: 20 INJECTION, SOLUTION INTRAMUSCULAR; INTRAVENOUS at 08:08

## 2019-08-28 RX ADMIN — MIDAZOLAM HYDROCHLORIDE 1 MG: 1 INJECTION, SOLUTION INTRAMUSCULAR; INTRAVENOUS at 07:08

## 2019-08-28 RX ADMIN — ONDANSETRON 4 MG: 2 INJECTION INTRAMUSCULAR; INTRAVENOUS at 08:08

## 2019-08-28 RX ADMIN — CEFAZOLIN SODIUM 2 G: 2 SOLUTION INTRAVENOUS at 08:08

## 2019-08-28 RX ADMIN — FENTANYL CITRATE 25 MCG: 50 INJECTION INTRAMUSCULAR; INTRAVENOUS at 07:08

## 2019-08-28 RX ADMIN — FAMOTIDINE 20 MG: 20 INJECTION, SOLUTION INTRAVENOUS at 08:08

## 2019-08-28 RX ADMIN — ACETAMINOPHEN 1000 MG: 500 TABLET, FILM COATED ORAL at 08:08

## 2019-08-28 RX ADMIN — BUPIVACAINE HYDROCHLORIDE 5 ML: 5 INJECTION, SOLUTION EPIDURAL; INTRACAUDAL; PERINEURAL at 07:08

## 2019-08-28 RX ADMIN — SODIUM CHLORIDE, SODIUM LACTATE, POTASSIUM CHLORIDE, AND CALCIUM CHLORIDE: .6; .31; .03; .02 INJECTION, SOLUTION INTRAVENOUS at 09:08

## 2019-08-28 RX ADMIN — PROPOFOL 100 MG: 10 INJECTION, EMULSION INTRAVENOUS at 08:08

## 2019-08-28 RX ADMIN — LIDOCAINE HYDROCHLORIDE 100 MG: 20 INJECTION, SOLUTION EPIDURAL; INFILTRATION; INTRACAUDAL; PERINEURAL at 08:08

## 2019-08-28 RX ADMIN — ROCURONIUM BROMIDE 5 MG: 10 INJECTION, SOLUTION INTRAVENOUS at 08:08

## 2019-08-28 RX ADMIN — FENTANYL CITRATE 50 MCG: 50 INJECTION INTRAMUSCULAR; INTRAVENOUS at 08:08

## 2019-08-28 RX ADMIN — SODIUM CHLORIDE, SODIUM LACTATE, POTASSIUM CHLORIDE, AND CALCIUM CHLORIDE: .6; .31; .03; .02 INJECTION, SOLUTION INTRAVENOUS at 07:08

## 2019-08-28 RX ADMIN — PROPOFOL 50 MG: 10 INJECTION, EMULSION INTRAVENOUS at 09:08

## 2019-08-28 NOTE — ANESTHESIA PROCEDURE NOTES
Right single shot interscalene nerve block with Exparel    Patient location during procedure: pre-op   Block not for primary anesthetic.  Reason for block: at surgeon's request and post-op pain management   Post-op Pain Location: Right shoulder  Start time: 8/28/2019 7:01 AM  Timeout: 8/28/2019 7:00 AM   End time: 8/28/2019 7:15 AM    Staffing  Authorizing Provider: Lloyd Messina Jr., MD  Performing Provider: Deon Sam MD    Preanesthetic Checklist  Completed: patient identified, site marked, surgical consent, pre-op evaluation, timeout performed, IV checked, risks and benefits discussed and monitors and equipment checked  Peripheral Block  Patient position: left lateral decubitus  Prep: ChloraPrep  Patient monitoring: heart rate, cardiac monitor, continuous pulse ox and frequent blood pressure checks  Block type: brachial plexus  Laterality: right  Injection technique: single shot  Needle  Needle type: Stimuplex   Needle gauge: 22 G  Needle length: 3.5 in  Needle localization: ultrasound guidance and nerve stimulator  Needle insertion depth: 4 cm     Assessment  Injection assessment: negative aspiration, negative parasthesia and local visualized surrounding nerve  Paresthesia pain: none  Heart rate change: no  Slow fractionated injection: yes  Additional Notes  Good right upper extremity twitches, lost at 0.6 mA.  5 cc bupivacaine 0.5% injected along posterolateral aspect of the brachial plexus, followed by 10 cc Exparel.

## 2019-08-28 NOTE — TRANSFER OF CARE
Anesthesia Transfer of Care Note    Patient: Faisal Quiros    Procedure(s) Performed: Procedure(s) (LRB):  ARTHROSCOPY, SHOULDER (Right)    Patient location: PACU    Anesthesia Type: general    Transport from OR: Transported from OR on room air with adequate spontaneous ventilation    Post pain: adequate analgesia    Post assessment: no apparent anesthetic complications    Post vital signs: stable    Level of consciousness: awake, alert and oriented    Nausea/Vomiting: no nausea/vomiting    Complications: none    Transfer of care protocol was followedComments: To PACU. Pt in NAD. VSS. Report to RN per protocol.        Last vitals:   Visit Vitals  BP (!) 159/76 (BP Location: Left arm, Patient Position: Lying)   Pulse 66   Temp 36.1 °C (97 °F) (Temporal)   Resp 16   SpO2 100%

## 2019-08-28 NOTE — OP NOTE
[facility]  Surgery Department  Operative Note    SUMMARY     Date of Procedure: 8/28/2019     Procedure: right shoulder subacromial decompression                      Right shoulder distal clavicle resection                       Right shoulder glenohumeral debridement extensive    Surgeon(s) and Role:     * Stone Suarez MD - Primary    Assisting Surgeon: daniela    Pre-Operative Diagnosis: Impingement syndrome of shoulder, right [M75.41]  Tear of right rotator cuff, unspecified tear extent [M75.101]  Arthritis of right shoulder region [M19.011]    Post-Operative Diagnosis: Post-Op Diagnosis Codes:     * Impingement syndrome of shoulder, right [M75.41]     * Tear of right rotator cuff, unspecified tear extent [M75.101]     * Arthritis of right shoulder region [M19.011]    Anesthesia: General    Intraoperative Findings: the glenohumeral joint was noted to have fraying of the labrum. The biceps tendon was shredded all the way to the groove. There was undersurface tearing of the rotator cuff. There was synovitis in the rotator interval. There was a very thickened acromial spur. The anterior acromial spur was extremely large in contact with cuff. The distal clavicle was very arthritic and in contact with the rotator cuff. The rotator cuff demonstrated no full-thickness tearing    Description of the Findings of the Procedure: the patient was placed on the operating table in the lateral decubitus position. He was prepped and draped in the usual sterile manner for surgery. Standard posterior arthroscopic portal was established and diagnostic arthroscopy was undertaken. Findings of those stated above. At this point I turned my attention to the glenohumeral joint. An anterior portal was established and we used the shaver to extensively debride the labrum the rotator interval the undersurface of the rotator cuff and the biceps tendon. The biceps tendon was released and debridement back to the groove. We now  placed the arthroscope into the subacromial space. A direct lateral portal was established and he weighed was used to take down the coracoacromial ligament and resect the subacromial bursa. We now used a resector and smoothed the acromion to a type I acromion. The anterior portal was now utilized and we resected the distal 8 mm of clavicle.We now removed the arthroscopic equipment. The portals were closed with nylon stitches. Sterile dressings were applied and the patient was taken to the recovery room in stable condition    Complications: No    Estimated Blood Loss (EBL): * No values recorded between 8/28/2019  9:10 AM and 8/28/2019  9:48 AM *           Implants: * No implants in log *    Specimens:   Specimen (12h ago, onward)    None                  Condition: Good    Disposition: PACU - hemodynamically stable.                 Discharge Note    SUMMARY     Admit Date: 8/28/2019    Discharge Date and Time:  08/28/2019 9:48 AM    Hospital Course (synopsis of major diagnoses, care, treatment, and services provided during the course of the hospital stay): Uneventful      Final Diagnosis: Post-Op Diagnosis Codes:     * Impingement syndrome of shoulder, right [M75.41]     * Tear of right rotator cuff, unspecified tear extent [M75.101]     * Arthritis of right shoulder region [M19.011]    Disposition: Home or Self Care    Follow Up/Patient Instructions:     Medications:  Reconciled Home Medications:   Current Discharge Medication List      CONTINUE these medications which have NOT CHANGED    Details   levocetirizine (XYZAL) 5 MG tablet Take 1 tablet (5 mg total) by mouth 2 (two) times daily.  Qty: 60 tablet, Refills: 3    Associated Diagnoses: Non-seasonal allergic rhinitis due to pollen           Discharge Procedure Orders   Diet general     Call MD for:  temperature >100.4     Call MD for:  persistent nausea and vomiting     Call MD for:  severe uncontrolled pain     Call MD for:  difficulty breathing, headache or  visual disturbances     Call MD for:  redness, tenderness, or signs of infection (pain, swelling, redness, odor or green/yellow discharge around incision site)     Call MD for:  hives     Call MD for:  persistent dizziness or light-headedness     Call MD for:  extreme fatigue     Remove dressing in 24 hours     Shower on day dressing removed (No bath)     Follow-up Information     Stone Suarez MD.    Specialties:  Orthopedic Surgery, Surgery, Sports Medicine  Why:  For wound re-check, For suture removal  Contact information:  [address]  [address] [id number] [phone]

## 2019-08-28 NOTE — ANESTHESIA POSTPROCEDURE EVALUATION
Anesthesia Post Evaluation    Patient: Faisal Quiros    Procedure(s) Performed: Procedure(s) (LRB):  ARTHROSCOPY, SHOULDER (Right)    Final Anesthesia Type: general  Patient location during evaluation: PACU  Patient participation: Yes- Able to Participate  Level of consciousness: awake and alert  Post-procedure vital signs: reviewed and stable  Pain management: adequate  Airway patency: patent  PONV status at discharge: No PONV  Anesthetic complications: no      Cardiovascular status: blood pressure returned to baseline and stable  Respiratory status: unassisted and room air  Hydration status: euvolemic  Follow-up not needed.          Vitals Value Taken Time   /78 8/28/2019 10:45 AM   Temp 36.2 °C (97.2 °F) 8/28/2019 10:45 AM   Pulse 56 8/28/2019 10:46 AM   Resp 5 8/28/2019 10:46 AM   SpO2 99 % 8/28/2019 10:46 AM   Vitals shown include unvalidated device data.      No case tracking events are documented in the log.      Pain/Lamar Score: Pain Rating Prior to Med Admin: 0 (8/28/2019  8:37 AM)  Lamar Score: 10 (8/28/2019 10:45 AM)

## 2019-08-28 NOTE — ANESTHESIA PREPROCEDURE EVALUATION
08/28/2019  Faisal Quiros is a 63 y.o., male.    Anesthesia Evaluation    I have reviewed the Patient Summary Reports.    I have reviewed the Nursing Notes.   I have reviewed the Medications.     Review of Systems  Anesthesia Hx:  Neg history of prior surgery. Denies Family Hx of Anesthesia complications.   Denies Personal Hx of Anesthesia complications.   Hematology/Oncology:        Oncology Comments: Skin cancer Hx   Cardiovascular:   Dysrhythmias SVT   Pulmonary:   Pneumonia Sleep Apnea Hx of Legionella pneumonia   Neurological:   LBP   Dermatological:   Seborrhea dermatitis     Patient Active Problem List   Diagnosis    Allergic rhinitis    Seborrhea-like dermatitis with psoriasiform elements    Non-seasonal allergic rhinitis due to pollen    Legionella pneumonia    Nontraumatic tear of rotator cuff, right       Results for orders placed or performed during the hospital encounter of 08/21/19   EKG 12-lead    Collection Time: 08/21/19  8:17 AM    Narrative    Test Reason : M75.41,M75.101,M19.011,    Vent. Rate : 053 BPM     Atrial Rate : 053 BPM     P-R Int : 244 ms          QRS Dur : 086 ms      QT Int : 418 ms       P-R-T Axes : 051 076 066 degrees     QTc Int : 392 ms    Sinus bradycardia with sinus arrhythmia with 1st degree A-V block  Otherwise normal ECG  No previous ECGs available  Confirmed by Elan VILLANUEVA, Ciro ([id number]) on 8/21/2019 11:52:10 AM    Referred By:  FINGER           Confirmed By:Ciro Ansari MD        Lab Results   Component Value Date    WBC 4.87 08/21/2019    HGB 13.7 (L) 08/21/2019    HCT 42.1 08/21/2019     (H) 08/21/2019     08/21/2019     BMP  Lab Results   Component Value Date     08/21/2019    K 3.9 08/21/2019     08/21/2019    CO2 29 08/21/2019    BUN 18 08/21/2019    CREATININE 0.7 08/21/2019    CALCIUM 9.1 08/21/2019    ANIONGAP 5 (L)  08/21/2019    ESTGFRAFRICA >60.0 08/21/2019    EGFRNONAA >60.0 08/21/2019           Physical Exam  General:  Well nourished    Airway/Jaw/Neck:  Airway Findings: Mouth Opening: Normal Tongue: Normal  General Airway Assessment: Adult  Mallampati: II  Improves to II with phonation.  TM Distance: Normal, at least 6 cm  Jaw/Neck Findings:  Neck ROM: Normal ROM       Chest/Lungs:  Chest/Lungs Findings: Clear to auscultation, Normal Respiratory Rate     Heart/Vascular:  Heart Findings: Rate: Normal  Rhythm: Regular Rhythm  Sounds: Normal        Mental Status:  Mental Status Findings:  Cooperative, Alert and Oriented         Anesthesia Plan  Type of Anesthesia, risks & benefits discussed:  Anesthesia Type:  general  Patient's Preference:   Intra-op Monitoring Plan: standard ASA monitors  Intra-op Monitoring Plan Comments:   Post Op Pain Control Plan: multimodal analgesia and peripheral nerve block  Post Op Pain Control Plan Comments:   Induction:   IV  Beta Blocker:  Patient is not currently on a Beta-Blocker (No further documentation required).       Informed Consent: Patient understands risks and agrees with Anesthesia plan.  Questions answered. Anesthesia consent signed with patient.  ASA Score: 2     Day of Surgery Review of History & Physical:        Anesthesia Plan Notes: Tylenol 1 gram po        Ready For Surgery From Anesthesia Perspective.

## 2019-08-28 NOTE — INTERVAL H&P NOTE
The patient has been examined and the H&P has been reviewed:    I concur with the findings and no changes have occurred since H&P was written.    Anesthesia/Surgery risks, benefits and alternative options discussed and understood by patient/family.          Active Hospital Problems    Diagnosis  POA    Nontraumatic tear of rotator cuff, right [M75.101]  Yes      Resolved Hospital Problems   No resolved problems to display.

## 2019-08-28 NOTE — DISCHARGE INSTRUCTIONS
"BRING PHOTO TO NEXT VISIT  FOLLOW ANESTHESIA"S INSTRUCTIONS  FOR  INTERSCALENE NERVE BLOCK  Discharge Instructions: After Your Surgery  Youve just had surgery. During surgery, you were given medicine called anesthesia to keep you relaxed and free of pain. After surgery, you may have some pain or nausea. This is common. Here are some tips for feeling better and getting well after surgery.         Stay on schedule with your medicine.   Going home  Your healthcare provider will show you how to take care of yourself when you go home. He or she will also answer your questions. Have an adult family member or friend drive you home. For the first 24 hours after your surgery:  · Do not drive or use heavy equipment.  · Do not make important decisions or sign legal papers.  · Do not drink alcohol.  · Have someone stay with you, if needed. He or she can watch for problems and help keep you safe.  Be sure to go to all follow-up visits with your healthcare provider. And rest after your surgery for as long as your healthcare provider tells you to.  Coping with pain  If you have pain after surgery, pain medicine will help you feel better. Take it as told, before pain becomes severe. Also, ask your healthcare provider or pharmacist about other ways to control pain. This might be with heat, ice, or relaxation. And follow any other instructions your surgeon or nurse gives you.  Tips for taking pain medicine  To get the best relief possible, remember these points:  · Pain medicines can upset your stomach. Taking them with a little food may help.  · Most pain relievers taken by mouth need at least 20 to 30 minutes to start to work.  · Taking medicine on a schedule can help you remember to take it. Try to time your medicine so that you can take it before starting an activity. This might be before you get dressed, go for a walk, or sit down for dinner.  · Constipation is a common side effect of pain medicines. Call your healthcare " provider before taking any medicines such as laxatives or stool softeners to help ease constipation. Also ask if you should skip any foods. Drinking lots of fluids and eating foods such as fruits and vegetables that are high in fiber can also help. Remember, do not take laxatives unless your surgeon has prescribed them.  · Drinking alcohol and taking pain medicine can cause dizziness and slow your breathing. It can even be deadly. Do not drink alcohol while taking pain medicine.  · Pain medicine can make you react more slowly to things. Do not drive or run machinery while taking pain medicine.  Your healthcare provider may tell you to take acetaminophen to help ease your pain. Ask him or her how much you are supposed to take each day. Acetaminophen or other pain relievers may interact with your prescription medicines or other over-the-counter (OTC) medicines. Some prescription medicines have acetaminophen and other ingredients. Using both prescription and OTC acetaminophen for pain can cause you to overdose. Read the labels on your OTC medicines with care. This will help you to clearly know the list of ingredients, how much to take, and any warnings. It may also help you not take too much acetaminophen. If you have questions or do not understand the information, ask your pharmacist or healthcare provider to explain it to you before you take the OTC medicine.  Managing nausea  Some people have an upset stomach after surgery. This is often because of anesthesia, pain, or pain medicine, or the stress of surgery. These tips will help you handle nausea and eat healthy foods as you get better. If you were on a special food plan before surgery, ask your healthcare provider if you should follow it while you get better. These tips may help:  · Do not push yourself to eat. Your body will tell you when to eat and how much.  · Start off with clear liquids and soup. They are easier to digest.  · Next try semi-solid foods, such  as mashed potatoes, applesauce, and gelatin, as you feel ready.  · Slowly move to solid foods. Dont eat fatty, rich, or spicy foods at first.  · Do not force yourself to have 3 large meals a day. Instead eat smaller amounts more often.  · Take pain medicines with a small amount of solid food, such as crackers or toast, to avoid nausea.     Call your surgeon if  · You still have pain an hour after taking medicine. The medicine may not be strong enough.  · You feel too sleepy, dizzy, or groggy. The medicine may be too strong.  · You have side effects like nausea, vomiting, or skin changes, such as rash, itching, or hives.       If you have obstructive sleep apnea  You were given anesthesia medicine during surgery to keep you comfortable and free of pain. After surgery, you may have more apnea spells because of this medicine and other medicines you were given. The spells may last longer than usual.   At home:  · Keep using the continuous positive airway pressure (CPAP) device when you sleep. Unless your healthcare provider tells you not to, use it when you sleep, day or night. CPAP is a common device used to treat obstructive sleep apnea.  · Talk with your provider before taking any pain medicine, muscle relaxants, or sedatives. Your provider will tell you about the possible dangers of taking these medicines.  Date Last Reviewed: 12/1/2016  © 1632-1559 [employer]. [address] [place] [address], [place] [address]. All rights reserved. This information is not intended as a substitute for professional medical care. Always follow your healthcare professional's instructions.

## 2019-08-29 ENCOUNTER — TELEPHONE (OUTPATIENT)
Dept: ANESTHESIOLOGY | Facility: HOSPITAL | Age: 64
End: 2019-08-29

## 2019-08-29 ENCOUNTER — TELEPHONE (OUTPATIENT)
Dept: ORTHOPEDICS | Facility: CLINIC | Age: 64
End: 2019-08-29

## 2019-08-29 NOTE — TELEPHONE ENCOUNTER
----- Message from Mary Echols sent at 8/29/2019  2:54 PM CDT -----  Contact: Patient  Patient has some post op questions. Patient of Dr. Suarez. Please call [phone]

## 2019-08-29 NOTE — TELEPHONE ENCOUNTER
Anesthesia Telephone Follow Up       Patient Name: Faisal Quiros  Date and time of call: 8/29/2019 and 10:16 AM  Call handled by: Lloyd Messina Jr   Current phone number: [phone] (home) [phone] (work)    Block:  Right single shot interscalene block with Exparel    Dictated Follow Up:  The patient is status post right shoulder surgery postop day 1.  Patient had single shot interscalene block with Exparel for postop pain relief.  Patient denies any shortness of breath or subjective fever or chills.  Patient reports excellent pain control overnight.  Patient reports that block is beginning to resolve in his hand.  Patient has some tingling in his fingers currently.  Patient is pleased with his block.  Will continue foam follow-up until patient has resolution of block.  Will follow.

## 2019-08-30 NOTE — TELEPHONE ENCOUNTER
P{atient status post right interscalene nerve block with long-acting bupivacaine postoperative day #2.  He reports an excellent result with the nerve block.  He stated that he has experienced no pain in the surgical site.  He does have residual tingling to his thumb and thenar eminence.  I explained to him that this is to be expected at this point from this particular nerve block.It is a good indication that the nerve block is working very well.  He has regained motor function to his right upper extremity.  He has regained full strength to his arm.  No signs of infection and nerve block site.  We will continue to follow-up until the nerve block resolves completely.

## 2019-08-31 ENCOUNTER — TELEPHONE (OUTPATIENT)
Dept: ANESTHESIOLOGY | Facility: HOSPITAL | Age: 64
End: 2019-08-31

## 2019-08-31 NOTE — TELEPHONE ENCOUNTER
I attempted to reach patient by phone but left a message on voicemail.  I instructed him to call the hospital and ask for the anesthesiologist on call. If no response today we intend to call again tomorrow.

## 2019-08-31 NOTE — TELEPHONE ENCOUNTER
Patient returned the phone call from earlier todayas we were trying to follow-up with him for a nerve block that we placed 3 days ago for right shoulder surgery. He reports almost complete resolution of nerve block symptoms. He's regained complete motor function to his right upper extremity. Small bit of tingling still at the right thumb. This also seems to be resolving.  No signs of infection nerve block site.  We plan to follow-up 1 additional day on September 2.

## 2019-09-10 ENCOUNTER — OFFICE VISIT (OUTPATIENT)
Dept: ORTHOPEDICS | Facility: CLINIC | Age: 64
End: 2019-09-10
Payer: COMMERCIAL

## 2019-09-10 VITALS
DIASTOLIC BLOOD PRESSURE: 72 MMHG | SYSTOLIC BLOOD PRESSURE: 118 MMHG | HEART RATE: 84 BPM | HEIGHT: 72 IN | WEIGHT: 170 LBS | BODY MASS INDEX: 23.03 KG/M2

## 2019-09-10 DIAGNOSIS — Z98.890 STATUS POST ARTHROSCOPY OF RIGHT SHOULDER: Primary | ICD-10-CM

## 2019-09-10 PROCEDURE — 99024 PR POST-OP FOLLOW-UP VISIT: ICD-10-PCS | Mod: S$GLB,,, | Performed by: ORTHOPAEDIC SURGERY

## 2019-09-10 PROCEDURE — 99024 POSTOP FOLLOW-UP VISIT: CPT | Mod: S$GLB,,, | Performed by: ORTHOPAEDIC SURGERY

## 2019-09-10 RX ORDER — OXYCODONE AND ACETAMINOPHEN 7.5; 325 MG/1; MG/1
1 TABLET ORAL EVERY 4 HOURS PRN
Refills: 0 | COMMUNITY
Start: 2019-08-28 | End: 2019-09-10

## 2019-09-10 NOTE — PROGRESS NOTES
[facility] POST-OP NOTE    Subjective:           Chief Complaint:   Chief Complaint   Patient presents with    Right Shoulder - Post-op Evaluation     Right shoulder scope 8.28.19. Suture removal. States that his shoulder is doing amazing. States that he has no pain.        Past Medical History:   Diagnosis Date    Allergic rhinitis     Cancer     skin    Hx of seasonal allergies 2000    Legionella pneumonia     Right shoulder pain 2018    surg scheduled    Seborrhea-like dermatitis with psoriasiform elements     Sleep apnea 2017    uses cpap nightly    SVT (supraventricular tachycardia) 2017    Transfusion reaction     hep c-- 1980's & treated       Past Surgical History:   Procedure Laterality Date    ARTHROSCOPY, SHOULDER Right 8/28/2019    Performed by [facility] at [facility] OR    chest xray      ct scan      LUMBAR DISC SURGERY      pet scan      right shoulder surgery      SHOULDER ARTHROSCOPY      total proctocolectomy         Current Outpatient Medications   Medication Sig    levocetirizine (XYZAL) 5 MG tablet Take 1 tablet (5 mg total) by mouth 2 (two) times daily.     Current Facility-Administered Medications   Medication    Allergy Mix       Review of patient's allergies indicates:  No Known Allergies    Family History   Problem Relation Age of Onset    Allergic rhinitis Neg Hx     Allergies Neg Hx     Angioedema Neg Hx     Asthma Neg Hx     Eczema Neg Hx     Atopy Neg Hx     Immunodeficiency Neg Hx     Rhinitis Neg Hx     Urticaria Neg Hx        Social History     Socioeconomic History    Marital status:      Spouse name: Not on file    Number of children: Not on file    Years of education: Not on file    Highest education level: Not on file   Occupational History    Not on file   Social Needs    Financial resource strain: Not on file    Food insecurity:     Worry: Not on file     Inability: Not on file    Transportation needs:     Medical: Not on  file     Non-medical: Not on file   Tobacco Use    Smoking status: Never Smoker    Smokeless tobacco: Never Used   Substance and Sexual Activity    Alcohol use: No    Drug use: No    Sexual activity: Not on file   Lifestyle    Physical activity:     Days per week: Not on file     Minutes per session: Not on file    Stress: Not on file   Relationships    Social connections:     Talks on phone: Not on file     Gets together: Not on file     Attends Mos[employer] service: Not on file     Active member of club or organization: Not on file     Attends meetings of clubs or organizations: Not on file     Relationship status: Not on file   Other Topics Concern    Not on file   Social History Narrative    Not on file       History of present illness: Patient comes in today for the right shoulder. He is doing very well.      Review of Systems:    Musculoskeletal:  See HPI      Objective:        Physical Examination:    Vital Signs:    Vitals:    09/10/19 1304   BP: 118/72   Pulse: 84       Body mass index is 23.06 kg/m².    This a well-developed, well nourished patient in no acute distress.  They are alert and oriented and cooperative to examination.        Wounds are clean dry and intact. Full range of motion the elbow.  Pertinent New Results:    XRAY Report / Interpretation:       Assessment/Plan:      Stable following arthroscopy of the right shoulder. I went over the findings with him in great detail. Started physical therapy. Follow-up in one month    This note was created using Dragon voice recognition software that occasionally misinterpreted phrases or words.

## 2019-09-12 ENCOUNTER — CLINICAL SUPPORT (OUTPATIENT)
Dept: ALLERGY | Facility: CLINIC | Age: 64
End: 2019-09-12
Payer: COMMERCIAL

## 2019-09-12 VITALS
BODY MASS INDEX: 23.03 KG/M2 | SYSTOLIC BLOOD PRESSURE: 116 MMHG | WEIGHT: 170 LBS | DIASTOLIC BLOOD PRESSURE: 70 MMHG | HEIGHT: 72 IN

## 2019-09-12 DIAGNOSIS — J30.1 NON-SEASONAL ALLERGIC RHINITIS DUE TO POLLEN: ICD-10-CM

## 2019-09-12 PROCEDURE — 95117 PR IMMU2THERAPY, 2+ INJECTIONS: ICD-10-PCS | Mod: S$GLB,,, | Performed by: ALLERGY & IMMUNOLOGY

## 2019-09-12 PROCEDURE — 95117 IMMUNOTHERAPY INJECTIONS: CPT | Mod: S$GLB,,, | Performed by: ALLERGY & IMMUNOLOGY

## 2019-09-12 RX ORDER — LEVOCETIRIZINE DIHYDROCHLORIDE 5 MG/1
5 TABLET, FILM COATED ORAL 2 TIMES DAILY
Qty: 60 TABLET | Refills: 3 | Status: SHIPPED | OUTPATIENT
Start: 2019-09-12 | End: 2020-07-23

## 2019-09-12 NOTE — PROGRESS NOTES
Immunotherapy Note: Allergy Shot      Subjective:       Patient ID: Faisal Quiros is a 63 y.o. male presents for monthly immunotherapy  Tolerated last injection  No New Medications  Beta Blockers: not on beta blocker      Reaction with last injection?: No  Are you on any beta blockers?: No  Has the vial ?: No    Are you ill today?: No  Asthma exacerbation or symptoms: No  Do you have a fever today?: No    Peak Flow: 670    Expiration Date #1: 20  Vial Concentration: 1:1 v/v (RED)  Dose (mL) #1: 0.5 mL  Location: Right upper arm  Given By: EL    Expiration Date #2: 20  Vial Concentration: 1:1 v/v (RED)  Dose (mL) #2: 0.5 mL  Location: Left upper arm  Given By : EL                           Objective:     Faisal Quiros observed for 30 minutes and discharged in good condition        Discussion:     Pt understands the current recommendation. All questions answered to the best of my ability. Continue current management plan.      Electronically signed by Karen Parker    Allergy/Imunology  [facility]  [place] [address]  [place] [address]   Office [phone]

## 2019-09-17 ENCOUNTER — TELEPHONE (OUTPATIENT)
Dept: ORTHOPEDICS | Facility: CLINIC | Age: 64
End: 2019-09-17

## 2019-09-17 DIAGNOSIS — Z98.890 STATUS POST ARTHROSCOPY OF RIGHT SHOULDER: Primary | ICD-10-CM

## 2019-10-08 ENCOUNTER — OFFICE VISIT (OUTPATIENT)
Dept: ORTHOPEDICS | Facility: CLINIC | Age: 64
End: 2019-10-08
Payer: COMMERCIAL

## 2019-10-08 VITALS
WEIGHT: 170 LBS | SYSTOLIC BLOOD PRESSURE: 110 MMHG | DIASTOLIC BLOOD PRESSURE: 68 MMHG | HEIGHT: 73 IN | BODY MASS INDEX: 22.53 KG/M2 | HEART RATE: 67 BPM

## 2019-10-08 DIAGNOSIS — Z98.890 STATUS POST ARTHROSCOPY OF RIGHT SHOULDER: Primary | ICD-10-CM

## 2019-10-08 PROCEDURE — 99024 PR POST-OP FOLLOW-UP VISIT: ICD-10-PCS | Mod: S$GLB,,, | Performed by: ORTHOPAEDIC SURGERY

## 2019-10-08 PROCEDURE — 99024 POSTOP FOLLOW-UP VISIT: CPT | Mod: S$GLB,,, | Performed by: ORTHOPAEDIC SURGERY

## 2019-10-08 NOTE — PROGRESS NOTES
[facility] POST-OP NOTE    Subjective:           Chief Complaint:   Chief Complaint   Patient presents with    Right Shoulder - Post-op Evaluation     Rt shoulder scope Follow up 8/28/2019. Patient is doing well. He is going to PT w/ Gabriel @ [facility]       Past Medical History:   Diagnosis Date    Allergic rhinitis     Cancer     skin    Hx of seasonal allergies 2000    Legionella pneumonia     Right shoulder pain 2018    surg scheduled    Seborrhea-like dermatitis with psoriasiform elements     Sleep apnea 2017    uses cpap nightly    SVT (supraventricular tachycardia) 2017    Transfusion reaction     hep c-- 1980's & treated       Past Surgical History:   Procedure Laterality Date    chest xray      ct scan      LUMBAR DISC SURGERY      pet scan      right shoulder surgery      SHOULDER ARTHROSCOPY      SHOULDER ARTHROSCOPY Right 8/28/2019    Procedure: ARTHROSCOPY, SHOULDER;  Surgeon: Stone Suarez MD;  [facility];  Service: Orthopedics;  Laterality: Right;  ARTHREX NOTIFIED    total proctocolectomy         Current Outpatient Medications   Medication Sig    levocetirizine (XYZAL) 5 MG tablet Take 1 tablet (5 mg total) by mouth 2 (two) times daily.     Current Facility-Administered Medications   Medication    Allergy Mix       Review of patient's allergies indicates:  No Known Allergies    Family History   Problem Relation Age of Onset    Allergic rhinitis Neg Hx     Allergies Neg Hx     Angioedema Neg Hx     Asthma Neg Hx     Eczema Neg Hx     Atopy Neg Hx     Immunodeficiency Neg Hx     Rhinitis Neg Hx     Urticaria Neg Hx        Social History     Socioeconomic History    Marital status:      Spouse name: Not on file    Number of children: Not on file    Years of education: Not on file    Highest education level: Not on file   Occupational History    Not on file   Social Needs    Financial resource strain: Not on file    Food insecurity:      Worry: Not on file     Inability: Not on file    Transportation needs:     Medical: Not on file     Non-medical: Not on file   Tobacco Use    Smoking status: Never Smoker    Smokeless tobacco: Never Used   Substance and Sexual Activity    Alcohol use: No    Drug use: No    Sexual activity: Not on file   Lifestyle    Physical activity:     Days per week: Not on file     Minutes per session: Not on file    Stress: Not on file   Relationships    Social connections:     Talks on phone: Not on file     Gets together: Not on file     Attends [employer] service: Not on file     Active member of club or organization: Not on file     Attends meetings of clubs or organizations: Not on file     Relationship status: Not on file   Other Topics Concern    Not on file   Social History Narrative    Not on file       History of present illness: Patient comes in today for the right shoulder. He is doing very well not having any issues.      Review of Systems:    Musculoskeletal:  See HPI      Objective:        Physical Examination:    Vital Signs:    Vitals:    10/08/19 0736   BP: 110/68   Pulse: 67       Body mass index is 22.43 kg/m².    This a well-developed, well nourished patient in no acute distress.  They are alert and oriented and cooperative to examination.        Patient is full range of motion of the right shoulder. Negative impingement negative crossover.  Pertinent New Results:    XRAY Report / Interpretation:       Assessment/Plan:      Stable following arthroscopy of the right shoulder. Doing very well. Follow-up when necessary    This note was created using Dragon voice recognition software that occasionally misinterpreted phrases or words.

## 2019-10-10 ENCOUNTER — CLINICAL SUPPORT (OUTPATIENT)
Dept: ALLERGY | Facility: CLINIC | Age: 64
End: 2019-10-10
Payer: COMMERCIAL

## 2019-10-10 VITALS
WEIGHT: 171 LBS | BODY MASS INDEX: 22.66 KG/M2 | DIASTOLIC BLOOD PRESSURE: 80 MMHG | HEIGHT: 73 IN | SYSTOLIC BLOOD PRESSURE: 126 MMHG

## 2019-10-10 DIAGNOSIS — J30.1 NON-SEASONAL ALLERGIC RHINITIS DUE TO POLLEN: ICD-10-CM

## 2019-10-10 PROCEDURE — 95117 IMMUNOTHERAPY INJECTIONS: CPT | Mod: S$GLB,,, | Performed by: ALLERGY & IMMUNOLOGY

## 2019-10-10 PROCEDURE — 95117 PR IMMU2THERAPY, 2+ INJECTIONS: ICD-10-PCS | Mod: S$GLB,,, | Performed by: ALLERGY & IMMUNOLOGY

## 2019-10-10 NOTE — PROGRESS NOTES
Immunotherapy Note: Allergy Shot      Subjective:       Patient ID: Faisal Quiros is a 63 y.o. male presents for monthly immunotherapy  Tolerated last injection  No New Medications  Beta Blockers: not on beta blocker      Reaction with last injection?: No  Are you on any beta blockers?: No  Has the vial ?: No    Are you ill today?: No  Asthma exacerbation or symptoms: No  Do you have a fever today?: No    Peak Flow: 660    Expiration Date #1: 20  Vial Concentration: 1:1 v/v (RED)  Dose (mL) #1: 0.5 mL  Location: Right upper arm  Given By: EL    Expiration Date #2: 20  Vial Concentration: 1:1 v/v (RED)  Dose (mL) #2: 0.5 mL  Location: Left upper arm  Given By : EL                           Objective:     Faisal Quiros observed for 30 minutes and discharged in good condition        Discussion:     Pt understands the current recommendation. All questions answered to the best of my ability. Continue current management plan.      Electronically signed by Karen Parker    Allergy/Imunology  [facility]  [place] [address]  [place] [address]   Office [phone]

## 2019-11-14 ENCOUNTER — CLINICAL SUPPORT (OUTPATIENT)
Dept: ALLERGY | Facility: CLINIC | Age: 64
End: 2019-11-14
Payer: COMMERCIAL

## 2019-11-14 VITALS
OXYGEN SATURATION: 98 % | BODY MASS INDEX: 23.46 KG/M2 | HEART RATE: 74 BPM | WEIGHT: 177 LBS | HEIGHT: 73 IN | SYSTOLIC BLOOD PRESSURE: 126 MMHG | DIASTOLIC BLOOD PRESSURE: 70 MMHG

## 2019-11-14 DIAGNOSIS — J30.1 NON-SEASONAL ALLERGIC RHINITIS DUE TO POLLEN: ICD-10-CM

## 2019-11-14 PROCEDURE — 95117 PR IMMU2THERAPY, 2+ INJECTIONS: ICD-10-PCS | Mod: S$GLB,,, | Performed by: ALLERGY & IMMUNOLOGY

## 2019-11-14 PROCEDURE — 95117 IMMUNOTHERAPY INJECTIONS: CPT | Mod: S$GLB,,, | Performed by: ALLERGY & IMMUNOLOGY

## 2019-11-14 NOTE — PROGRESS NOTES
Immunotherapy Note: Allergy Shot      Subjective:       Patient ID: Faisal Quiros is a 63 y.o. male presents for monthly immunotherapy  Tolerated last injection  No New Medications  Beta Blockers: not on beta blocker      Reaction with last injection?: No  If female, are you pregnant?: No  Are you on any beta blockers?: No  Has the vial ?: No    Are you ill today?: No  Asthma exacerbation or symptoms: No  Do you have a fever today?: No    Peak Flow: 670    Expiration Date #1: 20  Vial Concentration: 1:1 v/v (RED)  Dose (mL) #1: 0.5 mL  Location: Right upper arm  Given By: el    Expiration Date #2: 20  Vial Concentration: 1:1 v/v (RED)  Dose (mL) #2: 0.5 mL  Location: Left upper arm  Given By : el                           Objective:     Faisal Quiros observed for 30 minutes and discharged in good condition        Discussion:     Pt understands the current recommendation. All questions answered to the best of my ability. Continue current management plan.      Electronically signed by     Allergy/Imunology  [facility]  [facility]  [address]  [place] [address]   Office [phone]

## 2019-12-10 ENCOUNTER — CLINICAL SUPPORT (OUTPATIENT)
Dept: ALLERGY | Facility: CLINIC | Age: 64
End: 2019-12-10
Payer: COMMERCIAL

## 2019-12-10 VITALS — BODY MASS INDEX: 23.46 KG/M2 | HEART RATE: 75 BPM | OXYGEN SATURATION: 98 % | WEIGHT: 177 LBS | HEIGHT: 73 IN

## 2019-12-10 DIAGNOSIS — J30.1 NON-SEASONAL ALLERGIC RHINITIS DUE TO POLLEN: ICD-10-CM

## 2019-12-10 PROCEDURE — 95117 IMMUNOTHERAPY INJECTIONS: CPT | Mod: S$GLB,,, | Performed by: ALLERGY & IMMUNOLOGY

## 2019-12-10 PROCEDURE — 95117 PR IMMU2THERAPY, 2+ INJECTIONS: ICD-10-PCS | Mod: S$GLB,,, | Performed by: ALLERGY & IMMUNOLOGY

## 2019-12-10 NOTE — PROGRESS NOTES
Immunotherapy Note: Allergy Shot      Subjective:       Patient ID: Faisal Quiros is a 64 y.o. male presents for monthly immunotherapy  Tolerated last injection  No New Medications  Beta Blockers: not on beta blocker      Reaction with last injection?: No  Are you on any beta blockers?: No  Has the vial ?: No    Are you ill today?: No  Asthma exacerbation or symptoms: No  Do you have a fever today?: No    Peak Flow: 640    Expiration Date #1: 20  Vial Concentration: 1:1 v/v (RED)  Dose (mL) #1: 0.5 mL  Location: Right upper arm  Given By: EL    Expiration Date #2: 20  Vial Concentration: 1:1 v/v (RED)  Dose (mL) #2: 0.5 mL  Location: Left upper arm  Given By : EL                           Objective:     Faisal Quiros observed for 30 minutes and discharged in good condition        Discussion:     Pt understands the current recommendation. All questions answered to the best of my ability. Continue current management plan.      Electronically signed by     Allergy/Imunology  [facility]  [facility]  [address]  [place] [address]   Office [phone]

## 2020-01-09 ENCOUNTER — CLINICAL SUPPORT (OUTPATIENT)
Dept: ALLERGY | Facility: CLINIC | Age: 65
End: 2020-01-09
Payer: COMMERCIAL

## 2020-01-09 VITALS
WEIGHT: 176 LBS | HEART RATE: 67 BPM | HEIGHT: 73 IN | OXYGEN SATURATION: 98 % | SYSTOLIC BLOOD PRESSURE: 120 MMHG | BODY MASS INDEX: 23.33 KG/M2 | DIASTOLIC BLOOD PRESSURE: 68 MMHG

## 2020-01-09 DIAGNOSIS — J30.1 NON-SEASONAL ALLERGIC RHINITIS DUE TO POLLEN: ICD-10-CM

## 2020-01-09 PROCEDURE — 95117 PR IMMU2THERAPY, 2+ INJECTIONS: ICD-10-PCS | Mod: S$GLB,,, | Performed by: ALLERGY & IMMUNOLOGY

## 2020-01-09 PROCEDURE — 95117 IMMUNOTHERAPY INJECTIONS: CPT | Mod: S$GLB,,, | Performed by: ALLERGY & IMMUNOLOGY

## 2020-01-09 NOTE — PROGRESS NOTES
Immunotherapy Note: Allergy Shot      Subjective:       Patient ID: Faisal Quiros is a 64 y.o. male presents for monthly immunotherapy  Tolerated last injection  No New Medications  Beta Blockers: not on beta blocker      Reaction with last injection?: No  Are you on any beta blockers?: No  Has the vial ?: No    Are you ill today?: No  Asthma exacerbation or symptoms: No  Do you have a fever today?: No    Peak Flow: 660    Expiration Date #1: 20  Vial Concentration: 1:1 v/v (RED)  Dose (mL) #1: 0.4 mL  Location: Right upper arm  Given By: EL    Expiration Date #2: 20  Vial Concentration: 1:1 v/v (RED)  Dose (mL) #2: 0.4 mL  Location: Left upper arm  Given By : EL                           Objective:     Faisal Quiros observed for 30 minutes and discharged in good condition        Discussion:     Pt understands the current recommendation. All questions answered to the best of my ability. Continue current management plan.      Electronically signed by     Allergy/Imunology  [facility]  [facility]  [address]  [place] [address]   Office [phone]

## 2020-01-15 ENCOUNTER — DOCUMENTATION ONLY (OUTPATIENT)
Dept: ALLERGY | Facility: CLINIC | Age: 65
End: 2020-01-15
Payer: COMMERCIAL

## 2020-01-15 DIAGNOSIS — J30.1 NON-SEASONAL ALLERGIC RHINITIS DUE TO POLLEN: Primary | ICD-10-CM

## 2020-01-15 PROCEDURE — 95165 PR PROFES SVC,IMMUNOTHER,SINGLE/MULT AGS: ICD-10-PCS | Mod: S$GLB,,, | Performed by: ALLERGY & IMMUNOLOGY

## 2020-01-15 PROCEDURE — 95165 ANTIGEN THERAPY SERVICES: CPT | Mod: S$GLB,,, | Performed by: ALLERGY & IMMUNOLOGY

## 2020-01-15 NOTE — PROGRESS NOTES
Using 70% alcohol clean surface was prepped. Sterile drape then covered clean space.  Hands were scrubbed and placed into sterile gloves. Gown, mask, and hair net worn according to  guidelines allergist exemption.    Vial 1 and vial 2 refill.

## 2020-01-30 ENCOUNTER — OFFICE VISIT (OUTPATIENT)
Dept: ORTHOPEDICS | Facility: CLINIC | Age: 65
End: 2020-01-30
Payer: COMMERCIAL

## 2020-01-30 VITALS — WEIGHT: 173 LBS | SYSTOLIC BLOOD PRESSURE: 128 MMHG | BODY MASS INDEX: 22.82 KG/M2 | DIASTOLIC BLOOD PRESSURE: 78 MMHG

## 2020-01-30 DIAGNOSIS — M17.12 PRIMARY OSTEOARTHRITIS OF LEFT KNEE: Primary | ICD-10-CM

## 2020-01-30 PROCEDURE — 20610 DRAIN/INJ JOINT/BURSA W/O US: CPT | Mod: LT,S$GLB,, | Performed by: ORTHOPAEDIC SURGERY

## 2020-01-30 PROCEDURE — 3008F BODY MASS INDEX DOCD: CPT | Mod: S$GLB,,, | Performed by: ORTHOPAEDIC SURGERY

## 2020-01-30 PROCEDURE — 20610 LARGE JOINT ASPIRATION/INJECTION: L KNEE: ICD-10-PCS | Mod: LT,S$GLB,, | Performed by: ORTHOPAEDIC SURGERY

## 2020-01-30 PROCEDURE — 99213 OFFICE O/P EST LOW 20 MIN: CPT | Mod: 25,S$GLB,, | Performed by: ORTHOPAEDIC SURGERY

## 2020-01-30 PROCEDURE — 3008F PR BODY MASS INDEX (BMI) DOCUMENTED: ICD-10-PCS | Mod: S$GLB,,, | Performed by: ORTHOPAEDIC SURGERY

## 2020-01-30 PROCEDURE — 99213 PR OFFICE/OUTPT VISIT, EST, LEVL III, 20-29 MIN: ICD-10-PCS | Mod: 25,S$GLB,, | Performed by: ORTHOPAEDIC SURGERY

## 2020-01-30 RX ORDER — AMOXICILLIN 500 MG/1
500 CAPSULE ORAL
COMMUNITY
End: 2020-07-20

## 2020-01-30 RX ORDER — METHYLPREDNISOLONE ACETATE 40 MG/ML
40 INJECTION, SUSPENSION INTRA-ARTICULAR; INTRALESIONAL; INTRAMUSCULAR; SOFT TISSUE
Status: DISCONTINUED | OUTPATIENT
Start: 2020-01-30 | End: 2020-01-30 | Stop reason: HOSPADM

## 2020-01-30 RX ADMIN — METHYLPREDNISOLONE ACETATE 40 MG: 40 INJECTION, SUSPENSION INTRA-ARTICULAR; INTRALESIONAL; INTRAMUSCULAR; SOFT TISSUE at 07:01

## 2020-01-30 NOTE — PROCEDURES
Large Joint Aspiration/Injection: L knee  Date/Time: 1/30/2020 7:45 AM  Performed by: Stone Suarez MD  Authorized by: Stone Suarez MD     Consent Done?:  Yes (Verbal)  Indications:  Pain  Procedure site marked: Yes    Timeout: Prior to procedure the correct patient, procedure, and site was verified    Anesthesia  Local anesthesia used  Anesthetic: lidocaine 1% without epinephrine    Location:  Knee  Site:  L knee  Prep: Patient was prepped and draped in usual sterile fashion    Needle size:  25 G  Medications:  40 mg methylPREDNISolone acetate 40 mg/mL; 40 mg methylPREDNISolone acetate 40 mg/mL  Patient tolerance:  Patient tolerated the procedure well with no immediate complications

## 2020-01-30 NOTE — PROGRESS NOTES
[facility]    Subjective:     Chief Complaint:   Chief Complaint   Patient presents with    Left Knee - Pain     Left knee pain since he hurt his knee at the gym 12-23-19. No treatment. He thought it would get better       Past Medical History:   Diagnosis Date    Allergic rhinitis     Cancer     skin    Hx of seasonal allergies 2000    Legionella pneumonia     Right shoulder pain 2018    surg scheduled    Seborrhea-like dermatitis with psoriasiform elements     Sleep apnea 2017    uses cpap nightly    SVT (supraventricular tachycardia) 2017    Transfusion reaction     hep c-- 1980's & treated       Past Surgical History:   Procedure Laterality Date    chest xray      ct scan      LUMBAR DISC SURGERY      pet scan      right shoulder surgery      SHOULDER ARTHROSCOPY      SHOULDER ARTHROSCOPY Right 8/28/2019    Procedure: ARTHROSCOPY, SHOULDER;  Surgeon: Stone Suarez MD;  Location: [facility];  Service: Orthopedics;  Laterality: Right;  ARTHREX NOTIFIED    total proctocolectomy         Current Outpatient Medications   Medication Sig    amoxicillin (AMOXIL) 500 MG capsule Take 500 mg by mouth every 8 (eight) hours.    levocetirizine (XYZAL) 5 MG tablet Take 1 tablet (5 mg total) by mouth 2 (two) times daily.     Current Facility-Administered Medications   Medication    Allergy Mix       Review of patient's allergies indicates:  No Known Allergies    Family History   Problem Relation Age of Onset    Allergic rhinitis Neg Hx     Allergies Neg Hx     Angioedema Neg Hx     Asthma Neg Hx     Eczema Neg Hx     Atopy Neg Hx     Immunodeficiency Neg Hx     Rhinitis Neg Hx     Urticaria Neg Hx        Social History     Socioeconomic History    Marital status:      Spouse name: Not on file    Number of children: Not on file    Years of education: Not on file    Highest education level: Not on file   Occupational History    Not on file   Social Needs    Financial resource  strain: Not on file    Food insecurity:     Worry: Not on file     Inability: Not on file    Transportation needs:     Medical: Not on file     Non-medical: Not on file   Tobacco Use    Smoking status: Never Smoker    Smokeless tobacco: Never Used   Substance and Sexual Activity    Alcohol use: No    Drug use: No    Sexual activity: Not on file   Lifestyle    Physical activity:     Days per week: Not on file     Minutes per session: Not on file    Stress: Not on file   Relationships    Social connections:     Talks on phone: Not on file     Gets together: Not on file     Attends [employer] service: Not on file     Active member of club or organization: Not on file     Attends meetings of clubs or organizations: Not on file     Relationship status: Not on file   Other Topics Concern    Not on file   Social History Narrative    Not on file       History of present illness:  Patient returns for continued evaluation of the left knee.  This is the 1st time I have seen him for his left knee.  He was generally doing well until December when he did a long run on the treadmill.  Since that time his left knees been giving him a lot achy pain. No real instability.      Review of Systems:    Constitution: Negative for chills, fever, and sweats.  Negative for unexplained weight loss.    HENT:  Negative for headaches and blurry vision.    Cardiovascular:Negative for chest pain or irregular heart beat. Negative for hypertension.    Respiratory:  Negative for cough and shortness of breath.    Gastrointestinal: Negative for abdominal pain, heartburn, melena, nausea, and vomitting.    Genitourinary:  Negative bladder incontinence and dysuria.    Musculoskeletal:  See HPI for details.     Neurological: Negative for numbness.    Psychiatric/Behavioral: Negative for depression.  The patient is not nervous/anxious.      Endocrine: Negative for polyuria    Hematologic/Lymphatic: Negative for bleeding problem.  Does not  bruise/bleed easily.    Skin: Negative for poor would healing and rash    Objective:      Physical Examination:    Vital Signs:    Vitals:    01/30/20 0757   BP: 128/78       Body mass index is 22.82 kg/m².    This a well-developed, well nourished patient in no acute distress.  They are alert and oriented and cooperative to examination.        Patient has full range of motion of his right and left knee.  He has medial joint line tenderness on the left side.  Positive Abdoul's for pain but not a pop.  Stable knee to varus valgus stresses.  1+ effusion on the right.  Negative straight leg raises EHL is intact deep tendon reflexes are intact  Pertinent New Results:    XRAY Report / Interpretation:   AP lateral and sunrise views of the left knee demonstrate moderate to severe arthritis of the left knee with loss of the medial compartment.    Assessment/Plan:      Osteoarthritis left knee.  I injected the left knee with Depo-Medrol and lidocaine.  He will follow up p.r.n.      This note was created using Dragon voice recognition software that occasionally misinterpreted phrases or words.

## 2020-02-06 ENCOUNTER — CLINICAL SUPPORT (OUTPATIENT)
Dept: ALLERGY | Facility: CLINIC | Age: 65
End: 2020-02-06
Payer: COMMERCIAL

## 2020-02-06 VITALS
HEIGHT: 73 IN | OXYGEN SATURATION: 98 % | DIASTOLIC BLOOD PRESSURE: 89 MMHG | HEART RATE: 74 BPM | BODY MASS INDEX: 22.4 KG/M2 | WEIGHT: 169 LBS | SYSTOLIC BLOOD PRESSURE: 145 MMHG

## 2020-02-06 DIAGNOSIS — J30.1 NON-SEASONAL ALLERGIC RHINITIS DUE TO POLLEN: ICD-10-CM

## 2020-02-06 PROCEDURE — 95117 IMMUNOTHERAPY INJECTIONS: CPT | Mod: S$GLB,,, | Performed by: ALLERGY & IMMUNOLOGY

## 2020-02-06 PROCEDURE — 95117 PR IMMU2THERAPY, 2+ INJECTIONS: ICD-10-PCS | Mod: S$GLB,,, | Performed by: ALLERGY & IMMUNOLOGY

## 2020-02-06 NOTE — PROGRESS NOTES
Immunotherapy Note: Allergy Shot      Subjective:       Patient ID: Faisal Quiros is a 64 y.o. male presents for monthly immunotherapy  Tolerated last injection  No New Medications  Beta Blockers: not on beta blocker    Reaction with last injection?: No  Are you on any beta blockers?: No  Has the vial ?: No    Are you ill today?: No  Asthma exacerbation or symptoms: No  Do you have a fever today?: No    Peak Flow: 650    Expiration Date #1: 01/15/21  Vial Concentration: 1:1 v/v (RED)  Dose (mL) #1: 0.4 mL  Given By: EL    Expiration Date #2: 01/15/21  Vial Concentration: 1:1 v/v (RED)  Dose (mL) #2: 0.4 mL  Location: Left upper arm  Given By : EL                           Objective:     Faisal Quiros observed for 30 minutes and discharged in good condition        Discussion:     Pt understands the current recommendation. All questions answered to the best of my ability. Continue current management plan.      Electronically signed by Karen Parker    Allergy/Imunology  [facility]  [place] [address]  [place] [address]   Office [phone]

## 2020-03-05 ENCOUNTER — CLINICAL SUPPORT (OUTPATIENT)
Dept: ALLERGY | Facility: CLINIC | Age: 65
End: 2020-03-05
Payer: COMMERCIAL

## 2020-03-05 VITALS
BODY MASS INDEX: 22.4 KG/M2 | HEIGHT: 73 IN | WEIGHT: 169 LBS | HEART RATE: 69 BPM | DIASTOLIC BLOOD PRESSURE: 72 MMHG | SYSTOLIC BLOOD PRESSURE: 127 MMHG | OXYGEN SATURATION: 99 %

## 2020-03-05 DIAGNOSIS — J30.1 NON-SEASONAL ALLERGIC RHINITIS DUE TO POLLEN: ICD-10-CM

## 2020-03-05 PROCEDURE — 95117 PR IMMU2THERAPY, 2+ INJECTIONS: ICD-10-PCS | Mod: S$GLB,,, | Performed by: ALLERGY & IMMUNOLOGY

## 2020-03-05 PROCEDURE — 95117 IMMUNOTHERAPY INJECTIONS: CPT | Mod: S$GLB,,, | Performed by: ALLERGY & IMMUNOLOGY

## 2020-03-05 NOTE — PROGRESS NOTES
Immunotherapy Note: Allergy Shot      Subjective:       Patient ID: Faisal Quiros is a 64 y.o. male presents for monthly immunotherapy  Tolerated last injection  No New Medications  Beta Blockers: not on beta blocker    Reaction with last injection?: No  Are you on any beta blockers?: No  Has the vial ?: No    Are you ill today?: No  Asthma exacerbation or symptoms: No  Do you have a fever today?: No    Peak Flow: 550    Expiration Date #1: 01/15/21  Vial Concentration: 1:1 v/v (RED)  Dose (mL) #1: 0.5 mL  Location: Right upper arm  Given By: EL    Expiration Date #2: 01/15/21  Vial Concentration: 1:1 v/v (RED)  Dose (mL) #2: 0.5 mL  Location: Left upper arm  Given By : EL                           Objective:     Faisal Quiros observed for 30 minutes and discharged in good condition        Discussion:     Pt understands the current recommendation. All questions answered to the best of my ability. Continue current management plan.      Electronically signed by Fara Mcconnell    Allergy/Imunology  [facility]  [facility]  [address]  [place] [address]   Office [phone]

## 2020-04-20 ENCOUNTER — PATIENT OUTREACH (OUTPATIENT)
Dept: ADMINISTRATIVE | Facility: HOSPITAL | Age: 65
End: 2020-04-20

## 2020-04-21 NOTE — PROGRESS NOTES
Immunizations reviewed. [facility] reviewed. Care Everywhere reviewed. [facility] reviewed w/ no applicable results yielded. Portal message sent to patient. Chart review completed.           [facility] report   Chart scrubbed 04.20.20

## 2020-05-11 ENCOUNTER — CLINICAL SUPPORT (OUTPATIENT)
Dept: ALLERGY | Facility: CLINIC | Age: 65
End: 2020-05-11
Payer: COMMERCIAL

## 2020-05-11 VITALS
DIASTOLIC BLOOD PRESSURE: 76 MMHG | TEMPERATURE: 98 F | SYSTOLIC BLOOD PRESSURE: 125 MMHG | HEIGHT: 73 IN | BODY MASS INDEX: 22.4 KG/M2 | HEART RATE: 71 BPM | OXYGEN SATURATION: 98 % | WEIGHT: 169 LBS

## 2020-05-11 DIAGNOSIS — J30.1 NON-SEASONAL ALLERGIC RHINITIS DUE TO POLLEN: ICD-10-CM

## 2020-05-11 PROCEDURE — 95117 PR IMMU2THERAPY, 2+ INJECTIONS: ICD-10-PCS | Mod: S$GLB,,, | Performed by: ALLERGY & IMMUNOLOGY

## 2020-05-11 PROCEDURE — 95117 IMMUNOTHERAPY INJECTIONS: CPT | Mod: S$GLB,,, | Performed by: ALLERGY & IMMUNOLOGY

## 2020-05-11 NOTE — PROGRESS NOTES
Immunotherapy Note: Allergy Shot      Subjective:       Patient ID: Faisal Quiros is a 64 y.o. male presents for monthly immunotherapy  Tolerated last injection  No New Medications  Beta Blockers: not on beta blocker    Reaction with last injection?: No  Are you on any beta blockers?: No  Has the vial ?: No    Are you ill today?: No  Asthma exacerbation or symptoms: No  Do you have a fever today?: No    Peak Flow: 570    Expiration Date #1: 01/15/20  Vial Concentration: 1:1 v/v (RED)  Dose (mL) #1: 0.4 mL  Location: Right upper arm  Given By: EL    Expiration Date #2: 01/15/20  Vial Concentration: 1:1 v/v (RED)  Dose (mL) #2: 0.4 mL  Location: Left upper arm  Given By : EL                           Objective:     Faisal Quiros observed for 30 minutes and discharged in good condition        Discussion:     Pt understands the current recommendation. All questions answered to the best of my ability. Continue current management plan.      Electronically signed by Fara Mcconnell    Allergy/Imunology  [facility]  [facility]  [address]  [place] [address]   Office [phone]

## 2020-05-18 ENCOUNTER — CLINICAL SUPPORT (OUTPATIENT)
Dept: ALLERGY | Facility: CLINIC | Age: 65
End: 2020-05-18
Payer: COMMERCIAL

## 2020-05-18 VITALS
HEIGHT: 73 IN | WEIGHT: 169 LBS | HEART RATE: 74 BPM | BODY MASS INDEX: 22.4 KG/M2 | TEMPERATURE: 98 F | OXYGEN SATURATION: 98 %

## 2020-05-18 DIAGNOSIS — J30.1 NON-SEASONAL ALLERGIC RHINITIS DUE TO POLLEN: ICD-10-CM

## 2020-05-18 PROCEDURE — 95117 IMMUNOTHERAPY INJECTIONS: CPT | Mod: S$GLB,,, | Performed by: ALLERGY & IMMUNOLOGY

## 2020-05-18 PROCEDURE — 95117 PR IMMU2THERAPY, 2+ INJECTIONS: ICD-10-PCS | Mod: S$GLB,,, | Performed by: ALLERGY & IMMUNOLOGY

## 2020-05-18 NOTE — PROGRESS NOTES
Immunotherapy Note: Allergy Shot      Subjective:       Patient ID: Faisal Quiros is a 64 y.o. male presents for monthly immunotherapy  Tolerated last injection  No New Medications  Beta Blockers: not on beta blocker    Reaction with last injection?: No  Are you on any beta blockers?: No  Has the vial ?: No    Are you ill today?: No  Asthma exacerbation or symptoms: No  Do you have a fever today?: No    Peak Flow: 650    Expiration Date #1: 01/15/21  Vial Concentration: 1:1 v/v (RED)  Dose (mL) #1: 0.5 mL  Location: Right upper arm  Given By: EL    Expiration Date #2: 01/15/21  Vial Concentration: 1:1 v/v (RED)  Dose (mL) #2: 0.5 mL  Location: Left upper arm  Given By : EL                           Objective:     Faisal Quiros observed for 30 minutes and discharged in good condition        Discussion:     Pt understands the current recommendation. All questions answered to the best of my ability. Continue current management plan.      Electronically signed by Fara Mcconnell    Allergy/Imunology  [facility]  [facility]  [address]  [place] [address]   Office [phone]

## 2020-06-15 ENCOUNTER — CLINICAL SUPPORT (OUTPATIENT)
Dept: ALLERGY | Facility: CLINIC | Age: 65
End: 2020-06-15
Payer: COMMERCIAL

## 2020-06-15 VITALS
WEIGHT: 169 LBS | TEMPERATURE: 98 F | HEIGHT: 73 IN | HEART RATE: 67 BPM | OXYGEN SATURATION: 98 % | BODY MASS INDEX: 22.4 KG/M2

## 2020-06-15 DIAGNOSIS — J30.1 NON-SEASONAL ALLERGIC RHINITIS DUE TO POLLEN: ICD-10-CM

## 2020-06-15 PROCEDURE — 95117 PR IMMU2THERAPY, 2+ INJECTIONS: ICD-10-PCS | Mod: S$GLB,,, | Performed by: ALLERGY & IMMUNOLOGY

## 2020-06-15 PROCEDURE — 95117 IMMUNOTHERAPY INJECTIONS: CPT | Mod: S$GLB,,, | Performed by: ALLERGY & IMMUNOLOGY

## 2020-06-15 NOTE — PROGRESS NOTES
Immunotherapy Note: Allergy Shot      Subjective:       Patient ID: Faisal Quiros is a 64 y.o. male presents for monthly immunotherapy  Tolerated last injection  No New Medications  Beta Blockers: not on beta blocker    Reaction with last injection?: No  Are you on any beta blockers?: No  Has the vial ?: No    Are you ill today?: No  Asthma exacerbation or symptoms: No  Do you have a fever today?: No    Peak Flow: 610    Expiration Date #1: 01/15/21  Vial Concentration: 1:1 v/v (RED)  Dose (mL) #1: 0.5 mL  Location: Right upper arm  Given By: EL    Expiration Date #2: 01/15/21  Vial Concentration: 1:1 v/v (RED)  Dose (mL) #2: 0.5 mL  Location: Left upper arm  Given By : EL                           Objective:     Faisal Quiros observed for 30 minutes and discharged in good condition        Discussion:     Pt understands the current recommendation. All questions answered to the best of my ability. Continue current management plan.      Electronically signed by Fara Mcconnell    Allergy/Imunology  [facility]  [facility]  [address]  [place] [address]   Office [phone]

## 2020-07-13 ENCOUNTER — CLINICAL SUPPORT (OUTPATIENT)
Dept: ALLERGY | Facility: CLINIC | Age: 65
End: 2020-07-13
Payer: COMMERCIAL

## 2020-07-13 VITALS
HEIGHT: 73 IN | BODY MASS INDEX: 22.4 KG/M2 | WEIGHT: 169 LBS | OXYGEN SATURATION: 98 % | TEMPERATURE: 98 F | HEART RATE: 62 BPM

## 2020-07-13 DIAGNOSIS — J30.1 NON-SEASONAL ALLERGIC RHINITIS DUE TO POLLEN: ICD-10-CM

## 2020-07-13 PROCEDURE — 95117 IMMUNOTHERAPY INJECTIONS: CPT | Mod: S$GLB,,, | Performed by: ALLERGY & IMMUNOLOGY

## 2020-07-13 PROCEDURE — 95117 PR IMMU2THERAPY, 2+ INJECTIONS: ICD-10-PCS | Mod: S$GLB,,, | Performed by: ALLERGY & IMMUNOLOGY

## 2020-07-13 NOTE — PROGRESS NOTES
Immunotherapy Note: Allergy Shot      Subjective:       Patient ID: Faisal Quiros is a 64 y.o. male presents for monthly immunotherapy  Tolerated last injection  No New Medications  Beta Blockers: not on beta blocker    Reaction with last injection?: No  Are you on any beta blockers?: No  Has the vial ?: No    Are you ill today?: No  Asthma exacerbation or symptoms: No  Do you have a fever today?: No    Peak Flow: 610    Expiration Date #1: 01/15/21  Vial Concentration: 1:1 v/v (RED)  Dose (mL) #1: 0.5 mL  Location: Right upper arm  Given By: EL    Expiration Date #2: 01/15/21  Vial Concentration: 1:1 v/v (RED)  Dose (mL) #2: 0.5 mL  Location: Left upper arm  Given By : EL                           Objective:     Faisal Quiros observed for 30 minutes and discharged in good condition        Discussion:     Pt understands the current recommendation. All questions answered to the best of my ability. Continue current management plan.      Electronically signed by Fara Mcconnell    Allergy/Imunology  [facility]  [facility]  [address]  [place] [address]   Office [phone]

## 2020-07-20 ENCOUNTER — OFFICE VISIT (OUTPATIENT)
Dept: PODIATRY | Facility: CLINIC | Age: 65
End: 2020-07-20
Payer: COMMERCIAL

## 2020-07-20 VITALS
BODY MASS INDEX: 23.33 KG/M2 | WEIGHT: 176 LBS | RESPIRATION RATE: 18 BRPM | DIASTOLIC BLOOD PRESSURE: 76 MMHG | TEMPERATURE: 99 F | HEART RATE: 66 BPM | HEIGHT: 73 IN | SYSTOLIC BLOOD PRESSURE: 130 MMHG

## 2020-07-20 DIAGNOSIS — L60.0 INGROWN LEFT BIG TOENAIL: Primary | ICD-10-CM

## 2020-07-20 PROCEDURE — 99213 OFFICE O/P EST LOW 20 MIN: CPT | Mod: 25,S$GLB,, | Performed by: PODIATRIST

## 2020-07-20 PROCEDURE — 11750 NAIL REMOVAL: ICD-10-PCS | Mod: TA,S$GLB,, | Performed by: PODIATRIST

## 2020-07-20 PROCEDURE — 99213 PR OFFICE/OUTPT VISIT, EST, LEVL III, 20-29 MIN: ICD-10-PCS | Mod: 25,S$GLB,, | Performed by: PODIATRIST

## 2020-07-20 PROCEDURE — 3008F PR BODY MASS INDEX (BMI) DOCUMENTED: ICD-10-PCS | Mod: S$GLB,,, | Performed by: PODIATRIST

## 2020-07-20 PROCEDURE — 11750 EXCISION NAIL&NAIL MATRIX: CPT | Mod: TA,S$GLB,, | Performed by: PODIATRIST

## 2020-07-20 PROCEDURE — 3008F BODY MASS INDEX DOCD: CPT | Mod: S$GLB,,, | Performed by: PODIATRIST

## 2020-07-20 RX ORDER — CEPHALEXIN 500 MG/1
500 CAPSULE ORAL EVERY 12 HOURS
Qty: 14 CAPSULE | Refills: 0 | Status: SHIPPED | OUTPATIENT
Start: 2020-07-20 | End: 2020-07-27

## 2020-07-20 NOTE — PROGRESS NOTES
[address]  [place] [address]  Phone: [phone]   [phone]    Patient's PCP:Milan Chen III, MD  Referring Provider: No ref. provider found    Subjective:      Chief Complaint:: Ingrown Toenail (left first toe lateral border)    HPI  Faisal Quiros is a 64 y.o. male who presents with a complaint of reoccurring lateral border left ingrown nail lasting for four to five weeks. Onset of the symptoms was pain and inflammation.  Current symptoms include inflammation.  Aggravating factors are applied pressure. Symptoms have decreassed.Treatment to date have included home trimming, peroxide, and epsom salt soak Patients rates pain 0/10 on pain scale. Present with elevated blood pressure which he will continue to monitor at home.    Systemic Doctor:Milan Chen III, MD  Date Last Seen: November 2019    Vitals:    07/20/20 1455   BP: 130/76   Pulse: 66   Resp:    Temp:      Shoe Size: 11    Past Surgical History:   Procedure Laterality Date    chest xray      ct scan      LUMBAR DISC SURGERY      pet scan      right shoulder surgery      SHOULDER ARTHROSCOPY      SHOULDER ARTHROSCOPY Right 8/28/2019    Procedure: ARTHROSCOPY, SHOULDER;  Surgeon: Stone Suarez MD;  Location: [facility];  Service: Orthopedics;  Laterality: Right;  ARTHREX NOTIFIED    total proctocolectomy       Past Medical History:   Diagnosis Date    Allergic rhinitis     Cancer     skin    Hx of seasonal allergies 2000    Legionella pneumonia     Right shoulder pain 2018    surg scheduled    Seborrhea-like dermatitis with psoriasiform elements     Sleep apnea 2017    uses cpap nightly    SVT (supraventricular tachycardia) 2017    Transfusion reaction     hep c-- 1980's & treated     Family History   Problem Relation Age of Onset    Allergic rhinitis Neg Hx     Allergies Neg Hx     Angioedema Neg Hx     Asthma Neg Hx     Eczema Neg Hx     Atopy Neg Hx     Immunodeficiency Neg Hx     Rhinitis Neg Hx      Urticaria Neg Hx         Social History:   Marital Status:   Alcohol History:  reports no history of alcohol use.  Tobacco History:  reports that he has never smoked. He has never used smokeless tobacco.  Drug History:  reports no history of drug use.    Review of patient's allergies indicates:  No Known Allergies    Current Outpatient Medications   Medication Sig Dispense Refill    cephALEXin (KEFLEX) 500 MG capsule Take 1 capsule (500 mg total) by mouth every 12 (twelve) hours. for 7 days 14 capsule 0    levocetirizine (XYZAL) 5 MG tablet Take 1 tablet (5 mg total) by mouth 2 (two) times daily. 60 tablet 3     Current Facility-Administered Medications   Medication Dose Route Frequency Provider Last Rate Last Dose    Allergy Mix   Subcutaneous Q30 Days Karen Parker MD           Review of Systems   Musculoskeletal: Positive for arthralgias and joint swelling.         Objective:        Physical Exam:   Foot Exam    General  General Appearance: appears stated age and healthy   Orientation: alert and oriented to person, place, and time   Affect: appropriate   Gait: unimpaired       Left Foot/Ankle      Inspection and Palpation  Tenderness: (Lateral border left 1st toenail)  Swelling: none   Skin Exam: (Ingrown toenail lateral border left 1st toe with pain minimal redness no purulent drainage)    Neurovascular  Dorsalis pedis: 2+  Posterior tibial: 2+  Saphenous nerve sensation: normal  Tibial nerve sensation: normal  Superficial peroneal nerve sensation: normal  Deep peroneal nerve sensation: normal  Sural nerve sensation: normal          Physical Exam   Cardiovascular:   Pulses:       Dorsalis pedis pulses are 2+ on the left side.        Posterior tibial pulses are 2+ on the left side.   Musculoskeletal:        Feet:            Imaging:            Assessment:       1. Ingrown left big toenail      Plan:   Ingrown left big toenail  -     cephALEXin (KEFLEX) 500 MG capsule; Take 1 capsule (500 mg  total) by mouth every 12 (twelve) hours. for 7 days  Dispense: 14 capsule; Refill: 0     1.  Today evaluate the patient we discussed the findings of the pain in incurvation of the lateral border of left 1st toenail.  I I asked him if the medial border hurts him and he says no.  2.Ingrown toenail treatment options of no treatment, avulsion of nail border under local with regrowth of nail, chemical matrixectomy for attempted permanent correction of the problem. Patient was educated about daily dressing changes, soaks, and medications following removal of the nail.  We decided on matricectomy of the lateral border left 1st toe  Follow up in about 2 weeks (around 8/3/2020).    Nail Removal    Date/Time: 7/20/2020 2:40 PM  Performed by: Manfred Nance DPM  Authorized by: Manfred Nance DPM     Consent Done?:  Yes (Written)    Location:  Left foot  Location detail:  Left big toe  Anesthesia:  Digital block  Local anesthetic: lidocaine 2% without epinephrine  Anesthetic total (ml):  5  Preparation:  Skin prepped with alcohol    Amount removed:  Partial  Nail removed location: Lateral border.  Wedge excision of skin of nail fold: No    Nail bed sutured?: No    Nail matrix removed:  Partial  Removed nail replaced and anchored: No    Dressing applied:  4x4 and gauze roll  Patient tolerance:  Patient tolerated the procedure well with no immediate complications     Daily dressing changes with Neosporin and tube gauze and Keflex 500 mg twice a day.  Return in 2 weeks.     -     Counseling:     I provided patient education verbally regarding:   Patient diagnosis, treatment options, as well as alternatives, risks, and benefits.     This note was created using Dragon voice recognition software that occasionally misinterpreted phrases or words.                 Answers for HPI/ROS submitted by the patient on 7/20/2020   Chronicity: recurrent  Onset: 1 to 4 weeks ago  Frequency: every several days  Progression since onset:  waxing and waning  Aggravating factors: nothing  Treatments tried: nothing, immobilization  Improvement on treatment: mild

## 2020-07-20 NOTE — PATIENT INSTRUCTIONS

## 2020-08-11 ENCOUNTER — CLINICAL SUPPORT (OUTPATIENT)
Dept: ALLERGY | Facility: CLINIC | Age: 65
End: 2020-08-11
Payer: COMMERCIAL

## 2020-08-11 VITALS
WEIGHT: 176 LBS | TEMPERATURE: 98 F | HEART RATE: 64 BPM | SYSTOLIC BLOOD PRESSURE: 133 MMHG | BODY MASS INDEX: 23.33 KG/M2 | HEIGHT: 73 IN | OXYGEN SATURATION: 98 % | DIASTOLIC BLOOD PRESSURE: 72 MMHG

## 2020-08-11 DIAGNOSIS — J30.1 NON-SEASONAL ALLERGIC RHINITIS DUE TO POLLEN: ICD-10-CM

## 2020-08-11 PROCEDURE — 95117 IMMUNOTHERAPY INJECTIONS: CPT | Mod: S$GLB,,, | Performed by: ALLERGY & IMMUNOLOGY

## 2020-08-11 PROCEDURE — 95117 PR IMMU2THERAPY, 2+ INJECTIONS: ICD-10-PCS | Mod: S$GLB,,, | Performed by: ALLERGY & IMMUNOLOGY

## 2020-08-11 NOTE — PROGRESS NOTES
Immunotherapy Note: Allergy Shot      Subjective:       Patient ID: Faisal Quiros is a 64 y.o. male presents for monthly immunotherapy  Tolerated last injection  No New Medications  Beta Blockers: not on beta blocker    Reaction with last injection?: No  Are you on any beta blockers?: No  Has the vial ?: No    Are you ill today?: No  Asthma exacerbation or symptoms: No  Do you have a fever today?: No    Peak Flow: 610    Expiration Date #1: 01/15/21  Vial Concentration: 1:1 v/v (RED)  Dose (mL) #1: 0.5 mL  Location: Right upper arm  Given By: EL    Expiration Date #2: 01/15/21  Vial Concentration: 1:1 v/v (RED)  Dose (mL) #2: 0.5 mL  Location: Left upper arm  Given By : EL                           Objective:     Faisal Quiros observed for 30 minutes and discharged in good condition        Discussion:     Pt understands the current recommendation. All questions answered to the best of my ability. Continue current management plan.      Electronically signed by Fara Mcconnell    Allergy/Imunology  [facility]  [facility]  [address]  [place] [address]   Office [phone]

## 2020-09-08 ENCOUNTER — CLINICAL SUPPORT (OUTPATIENT)
Dept: ALLERGY | Facility: CLINIC | Age: 65
End: 2020-09-08
Payer: COMMERCIAL

## 2020-09-08 VITALS
SYSTOLIC BLOOD PRESSURE: 126 MMHG | TEMPERATURE: 98 F | HEIGHT: 73 IN | BODY MASS INDEX: 23.33 KG/M2 | OXYGEN SATURATION: 98 % | HEART RATE: 97 BPM | WEIGHT: 176 LBS | DIASTOLIC BLOOD PRESSURE: 78 MMHG

## 2020-09-08 DIAGNOSIS — J30.1 NON-SEASONAL ALLERGIC RHINITIS DUE TO POLLEN: ICD-10-CM

## 2020-09-08 PROCEDURE — 95117 IMMUNOTHERAPY INJECTIONS: CPT | Mod: S$GLB,,, | Performed by: ALLERGY & IMMUNOLOGY

## 2020-09-08 PROCEDURE — 95117 PR IMMU2THERAPY, 2+ INJECTIONS: ICD-10-PCS | Mod: S$GLB,,, | Performed by: ALLERGY & IMMUNOLOGY

## 2020-09-08 NOTE — PROGRESS NOTES
Immunotherapy Note: Allergy Shot      Subjective:       Patient ID: Faisal Quiros is a 64 y.o. male presents for monthly immunotherapy  Tolerated last injection  No New Medications  Beta Blockers: not on beta blocker    Reaction with last injection?: No  Are you on any beta blockers?: No  Has the vial ?: No    Are you ill today?: No  Asthma exacerbation or symptoms: No  Do you have a fever today?: No    Peak Flow: 530    Expiration Date #1: 01/15/21  Vial Concentration: 1:1 v/v (RED)  Dose (mL) #1: 0.5 mL  Location: Right upper arm  Given By: EL    Expiration Date #2: 01/15/21  Vial Concentration: 1:1 v/v (RED)  Dose (mL) #2: 0.5 mL  Location: Left upper arm  Given By : EL      Objective:     Faisal Quiros observed for 30 minutes and discharged in good condition        Discussion:     Pt understands the current recommendation. All questions answered to the best of my ability. Continue current management plan.      Electronically signed by Fara Mcconnell    Allergy/Imunology  [facility]  [facility]  [address]  [place] [address]   Office [phone]

## 2020-10-02 ENCOUNTER — PATIENT MESSAGE (OUTPATIENT)
Dept: ALLERGY | Facility: CLINIC | Age: 65
End: 2020-10-02

## 2020-10-05 ENCOUNTER — CLINICAL SUPPORT (OUTPATIENT)
Dept: PULMONOLOGY | Facility: CLINIC | Age: 65
End: 2020-10-05
Payer: COMMERCIAL

## 2020-10-05 VITALS
HEIGHT: 73 IN | HEART RATE: 64 BPM | WEIGHT: 176 LBS | BODY MASS INDEX: 23.33 KG/M2 | TEMPERATURE: 98 F | OXYGEN SATURATION: 99 %

## 2020-10-05 DIAGNOSIS — J30.1 NON-SEASONAL ALLERGIC RHINITIS DUE TO POLLEN: ICD-10-CM

## 2020-10-05 PROCEDURE — 95117 IMMUNOTHERAPY INJECTIONS: CPT | Mod: S$GLB,,, | Performed by: INTERNAL MEDICINE

## 2020-10-05 PROCEDURE — 95117 PR IMMU2THERAPY, 2+ INJECTIONS: ICD-10-PCS | Mod: S$GLB,,, | Performed by: INTERNAL MEDICINE

## 2020-10-05 NOTE — PROGRESS NOTES
Immunotherapy Note: Allergy Shot      Subjective:       Patient ID: Faisal Quiros is a 64 y.o. male presents for monthly immunotherapy  Tolerated last injection  No New Medications  Beta Blockers: not on beta blocker    Reaction with last injection?: No  Are you on any beta blockers?: No  Has the vial ?: No    Are you ill today?: No  Asthma exacerbation or symptoms: No  Do you have a fever today?: No         Expiration Date #1: 01/15/21  Vial Concentration: 1:1 v/v (RED)  Dose (mL) #1: 0.5 mL  Location: Right upper arm  Given By: EL    Expiration Date #2: 01/15/21  Vial Concentration: 1:1 v/v (RED)  Dose (mL) #2: 0.5 mL  Location: Left upper arm  Given By : EL       Objective:     Faisal Quiros observed for 30 minutes and discharged in good condition        Discussion:     Pt understands the current recommendation. All questions answered to the best of my ability. Continue current management plan.      Electronically signed by Fara Mcconnell

## 2020-11-03 ENCOUNTER — CLINICAL SUPPORT (OUTPATIENT)
Dept: PULMONOLOGY | Facility: CLINIC | Age: 65
End: 2020-11-03
Payer: COMMERCIAL

## 2020-11-03 VITALS
BODY MASS INDEX: 23.33 KG/M2 | HEART RATE: 60 BPM | HEIGHT: 73 IN | WEIGHT: 176 LBS | OXYGEN SATURATION: 98 % | TEMPERATURE: 98 F

## 2020-11-03 DIAGNOSIS — J30.1 NON-SEASONAL ALLERGIC RHINITIS DUE TO POLLEN: ICD-10-CM

## 2020-11-03 PROCEDURE — 95117 IMMUNOTHERAPY INJECTIONS: CPT | Mod: S$GLB,,, | Performed by: INTERNAL MEDICINE

## 2020-11-03 PROCEDURE — 95117 PR IMMU2THERAPY, 2+ INJECTIONS: ICD-10-PCS | Mod: S$GLB,,, | Performed by: INTERNAL MEDICINE

## 2020-11-03 NOTE — PROGRESS NOTES
Immunotherapy Note: Allergy Shot      Subjective:       Patient ID: Faisal Quiros is a 64 y.o. male presents for monthly immunotherapy  Tolerated last injection  No New Medications  Beta Blockers: not on beta blocker    Reaction with last injection?: No  Are you on any beta blockers?: No  Has the vial ?: No    Are you ill today?: No  Asthma exacerbation or symptoms: No  Do you have a fever today?: No    Peak Flow: 650    Expiration Date #1: 01/15/21  Vial Concentration: 1:1 v/v (RED)  Dose (mL) #1: 0.5 mL  Location: Right upper arm  Given By: EL    Expiration Date #2: 01/15/21  Vial Concentration: 1:1 v/v (RED)  Dose (mL) #2: 0.5 mL  Location: Left upper arm  Given By : EL       Objective:     Faisal Quiros observed for 30 minutes and discharged in good condition        Discussion:     Pt understands the current recommendation. All questions answered to the best of my ability. Continue current management plan.      Electronically signed by Fara Mcconnell

## 2020-11-30 ENCOUNTER — CLINICAL SUPPORT (OUTPATIENT)
Dept: ALLERGY | Facility: CLINIC | Age: 65
End: 2020-11-30
Payer: COMMERCIAL

## 2020-11-30 VITALS
TEMPERATURE: 97 F | WEIGHT: 176 LBS | OXYGEN SATURATION: 97 % | HEIGHT: 73 IN | SYSTOLIC BLOOD PRESSURE: 135 MMHG | DIASTOLIC BLOOD PRESSURE: 81 MMHG | HEART RATE: 61 BPM | BODY MASS INDEX: 23.33 KG/M2

## 2020-11-30 DIAGNOSIS — J30.1 NON-SEASONAL ALLERGIC RHINITIS DUE TO POLLEN: ICD-10-CM

## 2020-11-30 PROCEDURE — 95117 IMMUNOTHERAPY INJECTIONS: CPT | Mod: S$GLB,,, | Performed by: ALLERGY & IMMUNOLOGY

## 2020-11-30 PROCEDURE — 95117 PR IMMU2THERAPY, 2+ INJECTIONS: ICD-10-PCS | Mod: S$GLB,,, | Performed by: ALLERGY & IMMUNOLOGY

## 2020-11-30 NOTE — PROGRESS NOTES
Immunotherapy Note: Allergy Shot      Subjective:       Patient ID: Faisal Quiros is a 65 y.o. male presents for monthly immunotherapy  Tolerated last injection  No New Medications  Beta Blockers: not on beta blocker    Reaction with last injection?: No  Are you on any beta blockers?: No  Has the vial ?: No    Are you ill today?: No  Asthma exacerbation or symptoms: No  Do you have a fever today?: No    Peak Flow: 580    Expiration Date #1: 21  Vial Concentration: 1:1 v/v (RED)  Dose (mL) #1: 0.4 mL  Location: Right upper arm  Given By: EL    Expiration Date #2: 21  Vial Concentration: 1:1 v/v (RED)  Dose (mL) #2: 0.4 mL  Location: Left upper arm  Given By : EL      Objective:     Faisal Quiros observed for 30 minutes and discharged in good condition        Discussion:     Pt understands the current recommendation. All questions answered to the best of my ability. Continue current management plan.      Electronically signed by Fara Mcconnell    Allergy/Imunology  [facility]  [facility]  [address]  [place] [address]   Office [phone]

## 2020-12-03 DIAGNOSIS — Z00.00 WELLNESS EXAMINATION: Primary | ICD-10-CM

## 2020-12-07 ENCOUNTER — PROCEDURE VISIT (OUTPATIENT)
Dept: ALLERGY | Facility: CLINIC | Age: 65
End: 2020-12-07
Payer: COMMERCIAL

## 2020-12-07 DIAGNOSIS — J30.1 NON-SEASONAL ALLERGIC RHINITIS DUE TO POLLEN: ICD-10-CM

## 2020-12-07 PROCEDURE — 95165 ANTIGEN THERAPY SERVICES: CPT | Mod: S$GLB,,, | Performed by: ALLERGY & IMMUNOLOGY

## 2020-12-07 PROCEDURE — 95165 PR PROFES SVC,IMMUNOTHER,SINGLE/MULT AGS: ICD-10-PCS | Mod: S$GLB,,, | Performed by: ALLERGY & IMMUNOLOGY

## 2020-12-07 NOTE — PROGRESS NOTES
Using 70% alcohol clean surface was prepped. Sterile drape then covered clean space.  Hands were scrubbed and placed into sterile gloves. Gown, mask, and hair net worn according to  guidelines allergist exemption.     Remix Vials 1,2 for a total of 20 units  Mixed 2020   2021

## 2020-12-08 LAB
ALBUMIN SERPL-MCNC: 4.5 G/DL (ref 3.8–4.8)
ALBUMIN/GLOB SERPL: 1.8 {RATIO} (ref 1.2–2.2)
ALP SERPL-CCNC: 99 IU/L (ref 39–117)
ALT SERPL-CCNC: 22 IU/L (ref 0–44)
AST SERPL-CCNC: 26 IU/L (ref 0–40)
BASOPHILS # BLD AUTO: 0.1 X10E3/UL (ref 0–0.2)
BASOPHILS NFR BLD AUTO: 1 %
BILIRUB SERPL-MCNC: 1 MG/DL (ref 0–1.2)
BUN SERPL-MCNC: 19 MG/DL (ref 8–27)
BUN/CREAT SERPL: 22 (ref 10–24)
CALCIUM SERPL-MCNC: 9.1 MG/DL (ref 8.6–10.2)
CHLORIDE SERPL-SCNC: 103 MMOL/L (ref 96–106)
CHOLEST SERPL-MCNC: 195 MG/DL (ref 100–199)
CO2 SERPL-SCNC: 28 MMOL/L (ref 20–29)
CREAT SERPL-MCNC: 0.85 MG/DL (ref 0.76–1.27)
EOSINOPHIL # BLD AUTO: 0 X10E3/UL (ref 0–0.4)
EOSINOPHIL NFR BLD AUTO: 1 %
ERYTHROCYTE [DISTWIDTH] IN BLOOD BY AUTOMATED COUNT: 11.9 % (ref 11.6–15.4)
GLOBULIN SER CALC-MCNC: 2.5 G/DL (ref 1.5–4.5)
GLUCOSE SERPL-MCNC: 95 MG/DL (ref 65–99)
HCT VFR BLD AUTO: 46.7 % (ref 37.5–51)
HCV AB S/CO SERPL IA: 8.6 S/CO RATIO (ref 0–0.9)
HDLC SERPL-MCNC: 64 MG/DL
HGB BLD-MCNC: 15.9 G/DL (ref 13–17.7)
IMM GRANULOCYTES # BLD AUTO: 0 X10E3/UL (ref 0–0.1)
IMM GRANULOCYTES NFR BLD AUTO: 0 %
LDLC SERPL CALC-MCNC: 115 MG/DL (ref 0–99)
LYMPHOCYTES # BLD AUTO: 1.6 X10E3/UL (ref 0.7–3.1)
LYMPHOCYTES NFR BLD AUTO: 26 %
MCH RBC QN AUTO: 32.6 PG (ref 26.6–33)
MCHC RBC AUTO-ENTMCNC: 34 G/DL (ref 31.5–35.7)
MCV RBC AUTO: 96 FL (ref 79–97)
MONOCYTES # BLD AUTO: 0.6 X10E3/UL (ref 0.1–0.9)
MONOCYTES NFR BLD AUTO: 9 %
NEUTROPHILS # BLD AUTO: 3.8 X10E3/UL (ref 1.4–7)
NEUTROPHILS NFR BLD AUTO: 63 %
PLATELET # BLD AUTO: 258 X10E3/UL (ref 150–450)
POTASSIUM SERPL-SCNC: 4.6 MMOL/L (ref 3.5–5.2)
PROT SERPL-MCNC: 7 G/DL (ref 6–8.5)
PSA SERPL-MCNC: 1.2 NG/ML (ref 0–4)
RBC # BLD AUTO: 4.87 X10E6/UL (ref 4.14–5.8)
SODIUM SERPL-SCNC: 141 MMOL/L (ref 134–144)
TRIGL SERPL-MCNC: 91 MG/DL (ref 0–149)
VLDLC SERPL CALC-MCNC: 16 MG/DL (ref 5–40)
WBC # BLD AUTO: 6 X10E3/UL (ref 3.4–10.8)

## 2020-12-10 ENCOUNTER — OFFICE VISIT (OUTPATIENT)
Dept: FAMILY MEDICINE | Facility: CLINIC | Age: 65
End: 2020-12-10
Payer: COMMERCIAL

## 2020-12-10 VITALS
TEMPERATURE: 97 F | SYSTOLIC BLOOD PRESSURE: 128 MMHG | WEIGHT: 171 LBS | OXYGEN SATURATION: 99 % | HEART RATE: 64 BPM | DIASTOLIC BLOOD PRESSURE: 72 MMHG | BODY MASS INDEX: 22.56 KG/M2

## 2020-12-10 DIAGNOSIS — Z00.00 PHYSICAL EXAM: Primary | ICD-10-CM

## 2020-12-10 DIAGNOSIS — J30.9 ALLERGIC RHINITIS, UNSPECIFIED SEASONALITY, UNSPECIFIED TRIGGER: ICD-10-CM

## 2020-12-10 DIAGNOSIS — R32 URINARY INCONTINENCE, UNSPECIFIED TYPE: ICD-10-CM

## 2020-12-10 DIAGNOSIS — Z90.49 HISTORY OF COLECTOMY: ICD-10-CM

## 2020-12-10 LAB
BILIRUB SERPL-MCNC: NORMAL MG/DL
BLOOD URINE, POC: NORMAL
COLOR, POC UA: NORMAL
GLUCOSE UR QL STRIP: NORMAL
KETONES UR QL STRIP: NORMAL
LEUKOCYTE ESTERASE URINE, POC: NORMAL
NITRITE, POC UA: NORMAL
PH, POC UA: 5.5
PROTEIN, POC: NORMAL
SPECIFIC GRAVITY, POC UA: 1.01
UROBILINOGEN, POC UA: 0.2

## 2020-12-10 PROCEDURE — 1126F PR PAIN SEVERITY QUANTIFIED, NO PAIN PRESENT: ICD-10-PCS | Mod: S$GLB,,, | Performed by: FAMILY MEDICINE

## 2020-12-10 PROCEDURE — 81003 URINALYSIS AUTO W/O SCOPE: CPT | Mod: QW,S$GLB,, | Performed by: FAMILY MEDICINE

## 2020-12-10 PROCEDURE — 1126F AMNT PAIN NOTED NONE PRSNT: CPT | Mod: S$GLB,,, | Performed by: FAMILY MEDICINE

## 2020-12-10 PROCEDURE — 99396 PREV VISIT EST AGE 40-64: CPT | Mod: 25,S$GLB,, | Performed by: FAMILY MEDICINE

## 2020-12-10 PROCEDURE — 81003 POCT URINALYSIS W/O SCOPE: ICD-10-PCS | Mod: QW,S$GLB,, | Performed by: FAMILY MEDICINE

## 2020-12-10 PROCEDURE — 3008F PR BODY MASS INDEX (BMI) DOCUMENTED: ICD-10-PCS | Mod: CPTII,S$GLB,, | Performed by: FAMILY MEDICINE

## 2020-12-10 PROCEDURE — 99396 PR PREVENTIVE VISIT,EST,40-64: ICD-10-PCS | Mod: 25,S$GLB,, | Performed by: FAMILY MEDICINE

## 2020-12-10 PROCEDURE — 3008F BODY MASS INDEX DOCD: CPT | Mod: CPTII,S$GLB,, | Performed by: FAMILY MEDICINE

## 2020-12-13 PROBLEM — Z90.49 HISTORY OF COLECTOMY: Status: ACTIVE | Noted: 2020-12-13

## 2020-12-13 PROBLEM — R32 URINARY INCONTINENCE: Status: ACTIVE | Noted: 2020-12-13

## 2020-12-13 NOTE — PROGRESS NOTES
Subjective:       Patient ID: Faisal Quiros is a 65 y.o. male.    Chief Complaint: Follow-up    Here for physical exam.  Retired.    Social history no changes no significant alcohol intake.  Nonsmoker.  Exercising.  Prior history of hep C antibody positive but negative PCR.  Obstructive sleep apnea on CPAP at hs 0.9 doing well with that.  ENT some allergic rhinitis seeing Dr. Viviana LEIVA for shots.  Planning to have scope with pediatric scope.  Prior colectomy for ulcerative colitis 35 years ago with pouch.  Been about 5 years since his last scope.  Review of Systems   Constitutional: Negative.    HENT: Negative.    Eyes: Negative.    Respiratory: Negative.    Cardiovascular: Negative.    Gastrointestinal: Negative.    Endocrine: Negative.    Genitourinary: Negative.    Musculoskeletal: Negative.    Skin: Negative.    Neurological: Negative.    Hematological: Negative.    Psychiatric/Behavioral: Negative.        Objective:      Physical Exam  Vitals signs reviewed.   Constitutional:       Appearance: He is well-developed.   HENT:      Head: Normocephalic and atraumatic.   Eyes:      Conjunctiva/sclera: Conjunctivae normal.      Pupils: Pupils are equal, round, and reactive to light.   Neck:      Musculoskeletal: Normal range of motion.   Cardiovascular:      Rate and Rhythm: Normal rate and regular rhythm.      Heart sounds: Normal heart sounds.   Pulmonary:      Effort: Pulmonary effort is normal.      Breath sounds: Normal breath sounds.   Abdominal:      General: Bowel sounds are normal.      Palpations: Abdomen is soft. There is no mass.      Tenderness: There is no abdominal tenderness.   Skin:     General: Skin is warm and dry.         Assessment:       1. Physical exam    2. Allergic rhinitis, unspecified seasonality, unspecified trigger    3. History of colectomy    4. Urinary incontinence, unspecified type        Plan:       Physical exam  -     POCT URINALYSIS W/O SCOPE    Allergic rhinitis, unspecified  seasonality, unspecified trigger    History of colectomy    Urinary incontinence, unspecified type  -     POCT URINALYSIS W/O SCOPE    Did mention that is a little mild urinary dribbling.  So we did a urinalysis which was normal.  Not interested in a BPH medicines at this time.  PSA is normal.  Reviewed all labs which are normal.

## 2020-12-23 ENCOUNTER — PATIENT MESSAGE (OUTPATIENT)
Dept: FAMILY MEDICINE | Facility: CLINIC | Age: 65
End: 2020-12-23

## 2020-12-28 ENCOUNTER — CLINICAL SUPPORT (OUTPATIENT)
Dept: ALLERGY | Facility: CLINIC | Age: 65
End: 2020-12-28
Payer: COMMERCIAL

## 2020-12-28 VITALS
BODY MASS INDEX: 22.66 KG/M2 | SYSTOLIC BLOOD PRESSURE: 146 MMHG | HEIGHT: 73 IN | HEART RATE: 67 BPM | TEMPERATURE: 97 F | DIASTOLIC BLOOD PRESSURE: 80 MMHG | WEIGHT: 171 LBS

## 2020-12-28 DIAGNOSIS — J30.1 NON-SEASONAL ALLERGIC RHINITIS DUE TO POLLEN: ICD-10-CM

## 2020-12-28 PROCEDURE — 95117 IMMUNOTHERAPY INJECTIONS: CPT | Mod: S$GLB,,, | Performed by: ALLERGY & IMMUNOLOGY

## 2020-12-28 PROCEDURE — 95117 PR IMMU2THERAPY, 2+ INJECTIONS: ICD-10-PCS | Mod: S$GLB,,, | Performed by: ALLERGY & IMMUNOLOGY

## 2020-12-28 NOTE — PROGRESS NOTES
Immunotherapy Note: Allergy Shot      Subjective:       Patient ID: Faisal Quiros is a 65 y.o. male presents for monthly immunotherapy  Tolerated last injection  No New Medications  Beta Blockers: not on beta blocker    Reaction with last injection?: No  Are you on any beta blockers?: No  Has the vial ?: No    Are you ill today?: No  Asthma exacerbation or symptoms: No  Do you have a fever today?: No    Peak Flow: 640    Expiration Date #1: 21  Vial Concentration: 1:1 v/v (RED)  Dose (mL) #1: 0.5 mL  Location: Right upper arm  Given By: EL    Expiration Date #2: 21  Vial Concentration: 1:1 v/v (RED)  Dose (mL) #2: 0.5 mL  Location: Left upper arm  Given By : EL      Objective:     Faisal Quiros observed for 30 minutes and discharged in good condition        Discussion:     Pt understands the current recommendation. All questions answered to the best of my ability. Continue current management plan.      Electronically signed by Fara Mcconnell    Allergy/Imunology  [facility]  [facility]  [address]  [place] [address]   Office [phone]

## 2021-01-25 ENCOUNTER — CLINICAL SUPPORT (OUTPATIENT)
Dept: ALLERGY | Facility: CLINIC | Age: 66
End: 2021-01-25
Payer: COMMERCIAL

## 2021-01-25 VITALS
WEIGHT: 171 LBS | DIASTOLIC BLOOD PRESSURE: 75 MMHG | HEIGHT: 73 IN | TEMPERATURE: 97 F | SYSTOLIC BLOOD PRESSURE: 121 MMHG | HEART RATE: 62 BPM | BODY MASS INDEX: 22.66 KG/M2

## 2021-01-25 DIAGNOSIS — J30.1 NON-SEASONAL ALLERGIC RHINITIS DUE TO POLLEN: ICD-10-CM

## 2021-01-25 PROCEDURE — 95117 PR IMMU2THERAPY, 2+ INJECTIONS: ICD-10-PCS | Mod: S$GLB,,, | Performed by: ALLERGY & IMMUNOLOGY

## 2021-01-25 PROCEDURE — 95117 IMMUNOTHERAPY INJECTIONS: CPT | Mod: S$GLB,,, | Performed by: ALLERGY & IMMUNOLOGY

## 2021-02-22 ENCOUNTER — CLINICAL SUPPORT (OUTPATIENT)
Dept: ALLERGY | Facility: CLINIC | Age: 66
End: 2021-02-22
Payer: COMMERCIAL

## 2021-02-22 VITALS
BODY MASS INDEX: 22.66 KG/M2 | HEART RATE: 68 BPM | DIASTOLIC BLOOD PRESSURE: 75 MMHG | SYSTOLIC BLOOD PRESSURE: 123 MMHG | WEIGHT: 171 LBS | HEIGHT: 73 IN | TEMPERATURE: 97 F

## 2021-02-22 DIAGNOSIS — J30.1 NON-SEASONAL ALLERGIC RHINITIS DUE TO POLLEN: ICD-10-CM

## 2021-02-22 PROCEDURE — 95117 PR IMMU2THERAPY, 2+ INJECTIONS: ICD-10-PCS | Mod: S$GLB,,, | Performed by: ALLERGY & IMMUNOLOGY

## 2021-02-22 PROCEDURE — 95117 IMMUNOTHERAPY INJECTIONS: CPT | Mod: S$GLB,,, | Performed by: ALLERGY & IMMUNOLOGY

## 2021-03-19 ENCOUNTER — TELEPHONE (OUTPATIENT)
Dept: FAMILY MEDICINE | Facility: CLINIC | Age: 66
End: 2021-03-19

## 2021-03-22 ENCOUNTER — CLINICAL SUPPORT (OUTPATIENT)
Dept: ALLERGY | Facility: CLINIC | Age: 66
End: 2021-03-22
Payer: COMMERCIAL

## 2021-03-22 VITALS
DIASTOLIC BLOOD PRESSURE: 73 MMHG | HEART RATE: 57 BPM | SYSTOLIC BLOOD PRESSURE: 124 MMHG | WEIGHT: 171 LBS | BODY MASS INDEX: 22.66 KG/M2 | TEMPERATURE: 98 F | HEIGHT: 73 IN

## 2021-03-22 DIAGNOSIS — J30.1 NON-SEASONAL ALLERGIC RHINITIS DUE TO POLLEN: ICD-10-CM

## 2021-03-22 PROCEDURE — 95117 PR IMMU2THERAPY, 2+ INJECTIONS: ICD-10-PCS | Mod: S$GLB,,, | Performed by: ALLERGY & IMMUNOLOGY

## 2021-03-22 PROCEDURE — 95117 IMMUNOTHERAPY INJECTIONS: CPT | Mod: S$GLB,,, | Performed by: ALLERGY & IMMUNOLOGY

## 2021-04-19 ENCOUNTER — CLINICAL SUPPORT (OUTPATIENT)
Dept: ALLERGY | Facility: CLINIC | Age: 66
End: 2021-04-19
Payer: COMMERCIAL

## 2021-04-19 VITALS
TEMPERATURE: 97 F | SYSTOLIC BLOOD PRESSURE: 132 MMHG | DIASTOLIC BLOOD PRESSURE: 79 MMHG | HEIGHT: 73 IN | HEART RATE: 61 BPM | WEIGHT: 171 LBS | BODY MASS INDEX: 22.66 KG/M2

## 2021-04-19 DIAGNOSIS — J30.1 NON-SEASONAL ALLERGIC RHINITIS DUE TO POLLEN: ICD-10-CM

## 2021-04-19 PROCEDURE — 95117 PR IMMU2THERAPY, 2+ INJECTIONS: ICD-10-PCS | Mod: S$GLB,,, | Performed by: ALLERGY & IMMUNOLOGY

## 2021-04-19 PROCEDURE — 95117 IMMUNOTHERAPY INJECTIONS: CPT | Mod: S$GLB,,, | Performed by: ALLERGY & IMMUNOLOGY

## 2021-05-17 ENCOUNTER — CLINICAL SUPPORT (OUTPATIENT)
Dept: ALLERGY | Facility: CLINIC | Age: 66
End: 2021-05-17
Payer: COMMERCIAL

## 2021-05-17 VITALS
TEMPERATURE: 97 F | DIASTOLIC BLOOD PRESSURE: 77 MMHG | HEIGHT: 73 IN | HEART RATE: 65 BPM | BODY MASS INDEX: 22.66 KG/M2 | SYSTOLIC BLOOD PRESSURE: 130 MMHG | WEIGHT: 171 LBS

## 2021-05-17 DIAGNOSIS — J30.1 NON-SEASONAL ALLERGIC RHINITIS DUE TO POLLEN: ICD-10-CM

## 2021-05-17 PROCEDURE — 95117 PR IMMU2THERAPY, 2+ INJECTIONS: ICD-10-PCS | Mod: S$GLB,,, | Performed by: ALLERGY & IMMUNOLOGY

## 2021-05-17 PROCEDURE — 95117 IMMUNOTHERAPY INJECTIONS: CPT | Mod: S$GLB,,, | Performed by: ALLERGY & IMMUNOLOGY

## 2021-06-14 ENCOUNTER — CLINICAL SUPPORT (OUTPATIENT)
Dept: ALLERGY | Facility: CLINIC | Age: 66
End: 2021-06-14
Payer: COMMERCIAL

## 2021-06-14 VITALS
BODY MASS INDEX: 22.66 KG/M2 | SYSTOLIC BLOOD PRESSURE: 118 MMHG | WEIGHT: 171 LBS | HEART RATE: 67 BPM | DIASTOLIC BLOOD PRESSURE: 70 MMHG | TEMPERATURE: 97 F | HEIGHT: 73 IN

## 2021-06-14 DIAGNOSIS — J30.1 NON-SEASONAL ALLERGIC RHINITIS DUE TO POLLEN: ICD-10-CM

## 2021-06-14 PROCEDURE — 95117 PR IMMU2THERAPY, 2+ INJECTIONS: ICD-10-PCS | Mod: S$GLB,,, | Performed by: ALLERGY & IMMUNOLOGY

## 2021-06-14 PROCEDURE — 95117 IMMUNOTHERAPY INJECTIONS: CPT | Mod: S$GLB,,, | Performed by: ALLERGY & IMMUNOLOGY

## 2021-07-12 ENCOUNTER — CLINICAL SUPPORT (OUTPATIENT)
Dept: ALLERGY | Facility: CLINIC | Age: 66
End: 2021-07-12
Payer: COMMERCIAL

## 2021-07-12 VITALS
HEIGHT: 73 IN | WEIGHT: 171 LBS | BODY MASS INDEX: 22.66 KG/M2 | DIASTOLIC BLOOD PRESSURE: 76 MMHG | OXYGEN SATURATION: 97 % | HEART RATE: 83 BPM | SYSTOLIC BLOOD PRESSURE: 129 MMHG | TEMPERATURE: 97 F

## 2021-07-12 DIAGNOSIS — J30.1 NON-SEASONAL ALLERGIC RHINITIS DUE TO POLLEN: ICD-10-CM

## 2021-07-12 PROCEDURE — 95117 IMMUNOTHERAPY INJECTIONS: CPT | Mod: S$GLB,,, | Performed by: ALLERGY & IMMUNOLOGY

## 2021-07-12 PROCEDURE — 95117 PR IMMU2THERAPY, 2+ INJECTIONS: ICD-10-PCS | Mod: S$GLB,,, | Performed by: ALLERGY & IMMUNOLOGY

## 2021-07-13 ENCOUNTER — TELEPHONE (OUTPATIENT)
Dept: FAMILY MEDICINE | Facility: CLINIC | Age: 66
End: 2021-07-13

## 2021-08-09 ENCOUNTER — CLINICAL SUPPORT (OUTPATIENT)
Dept: ALLERGY | Facility: CLINIC | Age: 66
End: 2021-08-09
Payer: COMMERCIAL

## 2021-08-09 VITALS
HEART RATE: 67 BPM | WEIGHT: 171 LBS | OXYGEN SATURATION: 97 % | HEIGHT: 73 IN | DIASTOLIC BLOOD PRESSURE: 83 MMHG | BODY MASS INDEX: 22.66 KG/M2 | SYSTOLIC BLOOD PRESSURE: 131 MMHG

## 2021-08-09 DIAGNOSIS — J30.1 NON-SEASONAL ALLERGIC RHINITIS DUE TO POLLEN: ICD-10-CM

## 2021-08-09 PROCEDURE — 95117 IMMUNOTHERAPY INJECTIONS: CPT | Mod: S$GLB,,, | Performed by: ALLERGY & IMMUNOLOGY

## 2021-08-09 PROCEDURE — 95117 PR IMMU2THERAPY, 2+ INJECTIONS: ICD-10-PCS | Mod: S$GLB,,, | Performed by: ALLERGY & IMMUNOLOGY

## 2021-09-03 ENCOUNTER — DOCUMENTATION ONLY (OUTPATIENT)
Dept: ALLERGY | Facility: CLINIC | Age: 66
End: 2021-09-03
Payer: COMMERCIAL

## 2021-09-03 DIAGNOSIS — J30.1 SEASONAL ALLERGIC RHINITIS DUE TO POLLEN: ICD-10-CM

## 2021-09-03 PROCEDURE — 95165 PR PROFES SVC,IMMUNOTHER,SINGLE/MULT AGS: ICD-10-PCS | Mod: S$GLB,,, | Performed by: ALLERGY & IMMUNOLOGY

## 2021-09-03 PROCEDURE — 95165 ANTIGEN THERAPY SERVICES: CPT | Mod: S$GLB,,, | Performed by: ALLERGY & IMMUNOLOGY

## 2021-09-07 ENCOUNTER — CLINICAL SUPPORT (OUTPATIENT)
Dept: ALLERGY | Facility: CLINIC | Age: 66
End: 2021-09-07
Payer: COMMERCIAL

## 2021-09-07 VITALS
OXYGEN SATURATION: 97 % | HEART RATE: 58 BPM | SYSTOLIC BLOOD PRESSURE: 122 MMHG | DIASTOLIC BLOOD PRESSURE: 80 MMHG | WEIGHT: 171 LBS | HEIGHT: 73 IN | TEMPERATURE: 98 F | BODY MASS INDEX: 22.66 KG/M2

## 2021-09-07 DIAGNOSIS — J30.1 NON-SEASONAL ALLERGIC RHINITIS DUE TO POLLEN: ICD-10-CM

## 2021-09-07 PROCEDURE — 95117 PR IMMU2THERAPY, 2+ INJECTIONS: ICD-10-PCS | Mod: S$GLB,,, | Performed by: ALLERGY & IMMUNOLOGY

## 2021-09-07 PROCEDURE — 95117 IMMUNOTHERAPY INJECTIONS: CPT | Mod: S$GLB,,, | Performed by: ALLERGY & IMMUNOLOGY

## 2021-10-05 ENCOUNTER — TELEPHONE (OUTPATIENT)
Dept: FAMILY MEDICINE | Facility: CLINIC | Age: 66
End: 2021-10-05

## 2021-10-06 ENCOUNTER — OFFICE VISIT (OUTPATIENT)
Dept: FAMILY MEDICINE | Facility: CLINIC | Age: 66
End: 2021-10-06
Payer: COMMERCIAL

## 2021-10-06 VITALS
HEIGHT: 73 IN | BODY MASS INDEX: 23.99 KG/M2 | SYSTOLIC BLOOD PRESSURE: 141 MMHG | HEART RATE: 68 BPM | OXYGEN SATURATION: 96 % | TEMPERATURE: 98 F | DIASTOLIC BLOOD PRESSURE: 78 MMHG | WEIGHT: 181 LBS

## 2021-10-06 DIAGNOSIS — R22.31 AXILLARY MASS, RIGHT: Primary | ICD-10-CM

## 2021-10-06 PROCEDURE — 3077F SYST BP >= 140 MM HG: CPT | Mod: CPTII,S$GLB,, | Performed by: NURSE PRACTITIONER

## 2021-10-06 PROCEDURE — 3078F PR MOST RECENT DIASTOLIC BLOOD PRESSURE < 80 MM HG: ICD-10-PCS | Mod: CPTII,S$GLB,, | Performed by: NURSE PRACTITIONER

## 2021-10-06 PROCEDURE — 90686 IIV4 VACC NO PRSV 0.5 ML IM: CPT | Mod: S$GLB,,, | Performed by: NURSE PRACTITIONER

## 2021-10-06 PROCEDURE — 90471 FLU VACCINE (QUAD) GREATER THAN OR EQUAL TO 3YO PRESERVATIVE FREE IM: ICD-10-PCS | Mod: S$GLB,,, | Performed by: NURSE PRACTITIONER

## 2021-10-06 PROCEDURE — 99213 OFFICE O/P EST LOW 20 MIN: CPT | Mod: 25,S$GLB,, | Performed by: NURSE PRACTITIONER

## 2021-10-06 PROCEDURE — 90686 FLU VACCINE (QUAD) GREATER THAN OR EQUAL TO 3YO PRESERVATIVE FREE IM: ICD-10-PCS | Mod: S$GLB,,, | Performed by: NURSE PRACTITIONER

## 2021-10-06 PROCEDURE — 3008F BODY MASS INDEX DOCD: CPT | Mod: CPTII,S$GLB,, | Performed by: NURSE PRACTITIONER

## 2021-10-06 PROCEDURE — 1159F MED LIST DOCD IN RCRD: CPT | Mod: CPTII,S$GLB,, | Performed by: NURSE PRACTITIONER

## 2021-10-06 PROCEDURE — 3008F PR BODY MASS INDEX (BMI) DOCUMENTED: ICD-10-PCS | Mod: CPTII,S$GLB,, | Performed by: NURSE PRACTITIONER

## 2021-10-06 PROCEDURE — 99213 PR OFFICE/OUTPT VISIT, EST, LEVL III, 20-29 MIN: ICD-10-PCS | Mod: 25,S$GLB,, | Performed by: NURSE PRACTITIONER

## 2021-10-06 PROCEDURE — 90471 IMMUNIZATION ADMIN: CPT | Mod: S$GLB,,, | Performed by: NURSE PRACTITIONER

## 2021-10-06 PROCEDURE — 3077F PR MOST RECENT SYSTOLIC BLOOD PRESSURE >= 140 MM HG: ICD-10-PCS | Mod: CPTII,S$GLB,, | Performed by: NURSE PRACTITIONER

## 2021-10-06 PROCEDURE — 1159F PR MEDICATION LIST DOCUMENTED IN MEDICAL RECORD: ICD-10-PCS | Mod: CPTII,S$GLB,, | Performed by: NURSE PRACTITIONER

## 2021-10-06 PROCEDURE — 3078F DIAST BP <80 MM HG: CPT | Mod: CPTII,S$GLB,, | Performed by: NURSE PRACTITIONER

## 2021-10-06 RX ORDER — KETOCONAZOLE 20 MG/ML
SHAMPOO, SUSPENSION TOPICAL
COMMUNITY
Start: 2021-09-21 | End: 2022-06-22

## 2021-10-08 ENCOUNTER — HOSPITAL ENCOUNTER (OUTPATIENT)
Dept: RADIOLOGY | Facility: HOSPITAL | Age: 66
Discharge: HOME | End: 2021-10-08
Attending: NURSE PRACTITIONER
Payer: COMMERCIAL

## 2021-10-08 ENCOUNTER — OFFICE VISIT (OUTPATIENT)
Dept: FAMILY MEDICINE | Facility: CLINIC | Age: 66
End: 2021-10-08
Payer: COMMERCIAL

## 2021-10-08 ENCOUNTER — LAB VISIT (OUTPATIENT)
Dept: LAB | Facility: HOSPITAL | Age: 66
End: 2021-10-08
Attending: FAMILY MEDICINE
Payer: COMMERCIAL

## 2021-10-08 VITALS
HEART RATE: 66 BPM | DIASTOLIC BLOOD PRESSURE: 87 MMHG | WEIGHT: 181 LBS | BODY MASS INDEX: 23.99 KG/M2 | TEMPERATURE: 97 F | OXYGEN SATURATION: 98 % | SYSTOLIC BLOOD PRESSURE: 139 MMHG | HEIGHT: 73 IN

## 2021-10-08 DIAGNOSIS — I82.A11: Primary | ICD-10-CM

## 2021-10-08 DIAGNOSIS — R22.31 AXILLARY MASS, RIGHT: ICD-10-CM

## 2021-10-08 DIAGNOSIS — I82.A11: ICD-10-CM

## 2021-10-08 LAB
ALBUMIN SERPL BCP-MCNC: 4.2 G/DL (ref 3.5–5.2)
ALP SERPL-CCNC: 77 U/L (ref 55–135)
ALT SERPL W/O P-5'-P-CCNC: 30 U/L (ref 10–44)
ANION GAP SERPL CALC-SCNC: 8 MMOL/L (ref 8–16)
APTT PPP: 28.2 SEC (ref 25.6–35.8)
AST SERPL-CCNC: 32 U/L (ref 10–40)
BASOPHILS # BLD AUTO: 0.04 K/UL (ref 0–0.2)
BASOPHILS NFR BLD: 0.6 % (ref 0–1.9)
BILIRUB SERPL-MCNC: 1 MG/DL (ref 0.1–1)
BUN SERPL-MCNC: 17 MG/DL (ref 8–23)
CALCIUM SERPL-MCNC: 8.8 MG/DL (ref 8.7–10.5)
CHLORIDE SERPL-SCNC: 104 MMOL/L (ref 95–110)
CO2 SERPL-SCNC: 28 MMOL/L (ref 23–29)
CREAT SERPL-MCNC: 0.8 MG/DL (ref 0.5–1.4)
DIFFERENTIAL METHOD: ABNORMAL
EOSINOPHIL # BLD AUTO: 0.1 K/UL (ref 0–0.5)
EOSINOPHIL NFR BLD: 1.5 % (ref 0–8)
ERYTHROCYTE [DISTWIDTH] IN BLOOD BY AUTOMATED COUNT: 12 % (ref 11.5–14.5)
EST. GFR  (AFRICAN AMERICAN): >60 ML/MIN/1.73 M^2
EST. GFR  (NON AFRICAN AMERICAN): >60 ML/MIN/1.73 M^2
GLUCOSE SERPL-MCNC: 97 MG/DL (ref 70–110)
HCT VFR BLD AUTO: 44.7 % (ref 40–54)
HGB BLD-MCNC: 14.9 G/DL (ref 14–18)
IMM GRANULOCYTES # BLD AUTO: 0.03 K/UL (ref 0–0.04)
IMM GRANULOCYTES NFR BLD AUTO: 0.4 % (ref 0–0.5)
INR PPP: 1.1
LYMPHOCYTES # BLD AUTO: 1.6 K/UL (ref 1–4.8)
LYMPHOCYTES NFR BLD: 23.7 % (ref 18–48)
MCH RBC QN AUTO: 33.4 PG (ref 27–31)
MCHC RBC AUTO-ENTMCNC: 33.3 G/DL (ref 32–36)
MCV RBC AUTO: 100 FL (ref 82–98)
MONOCYTES # BLD AUTO: 0.6 K/UL (ref 0.3–1)
MONOCYTES NFR BLD: 8.4 % (ref 4–15)
NEUTROPHILS # BLD AUTO: 4.4 K/UL (ref 1.8–7.7)
NEUTROPHILS NFR BLD: 65.4 % (ref 38–73)
NRBC BLD-RTO: 0 /100 WBC
PLATELET # BLD AUTO: 232 K/UL (ref 150–450)
PMV BLD AUTO: 9 FL (ref 9.2–12.9)
POTASSIUM SERPL-SCNC: 4 MMOL/L (ref 3.5–5.1)
PROT SERPL-MCNC: 7.4 G/DL (ref 6–8.4)
PROTHROMBIN TIME: 13.7 SEC (ref 11.8–14.3)
RBC # BLD AUTO: 4.46 M/UL (ref 4.6–6.2)
SODIUM SERPL-SCNC: 140 MMOL/L (ref 136–145)
WBC # BLD AUTO: 6.76 K/UL (ref 3.9–12.7)

## 2021-10-08 PROCEDURE — 1159F PR MEDICATION LIST DOCUMENTED IN MEDICAL RECORD: ICD-10-PCS | Mod: CPTII,S$GLB,, | Performed by: FAMILY MEDICINE

## 2021-10-08 PROCEDURE — 93971 EXTREMITY STUDY: CPT | Mod: TC,RT

## 2021-10-08 PROCEDURE — 99213 PR OFFICE/OUTPT VISIT, EST, LEVL III, 20-29 MIN: ICD-10-PCS | Mod: S$GLB,,, | Performed by: FAMILY MEDICINE

## 2021-10-08 PROCEDURE — 1160F PR REVIEW ALL MEDS BY PRESCRIBER/CLIN PHARMACIST DOCUMENTED: ICD-10-PCS | Mod: CPTII,S$GLB,, | Performed by: FAMILY MEDICINE

## 2021-10-08 PROCEDURE — 3008F BODY MASS INDEX DOCD: CPT | Mod: CPTII,S$GLB,, | Performed by: FAMILY MEDICINE

## 2021-10-08 PROCEDURE — 85610 PROTHROMBIN TIME: CPT | Performed by: FAMILY MEDICINE

## 2021-10-08 PROCEDURE — 85730 THROMBOPLASTIN TIME PARTIAL: CPT | Performed by: FAMILY MEDICINE

## 2021-10-08 PROCEDURE — 3079F DIAST BP 80-89 MM HG: CPT | Mod: CPTII,S$GLB,, | Performed by: FAMILY MEDICINE

## 2021-10-08 PROCEDURE — 99213 OFFICE O/P EST LOW 20 MIN: CPT | Mod: S$GLB,,, | Performed by: FAMILY MEDICINE

## 2021-10-08 PROCEDURE — 3008F PR BODY MASS INDEX (BMI) DOCUMENTED: ICD-10-PCS | Mod: CPTII,S$GLB,, | Performed by: FAMILY MEDICINE

## 2021-10-08 PROCEDURE — 3075F PR MOST RECENT SYSTOLIC BLOOD PRESS GE 130-139MM HG: ICD-10-PCS | Mod: CPTII,S$GLB,, | Performed by: FAMILY MEDICINE

## 2021-10-08 PROCEDURE — 85025 COMPLETE CBC W/AUTO DIFF WBC: CPT | Performed by: FAMILY MEDICINE

## 2021-10-08 PROCEDURE — 80053 COMPREHEN METABOLIC PANEL: CPT | Performed by: FAMILY MEDICINE

## 2021-10-08 PROCEDURE — 1159F MED LIST DOCD IN RCRD: CPT | Mod: CPTII,S$GLB,, | Performed by: FAMILY MEDICINE

## 2021-10-08 PROCEDURE — 3075F SYST BP GE 130 - 139MM HG: CPT | Mod: CPTII,S$GLB,, | Performed by: FAMILY MEDICINE

## 2021-10-08 PROCEDURE — 36415 COLL VENOUS BLD VENIPUNCTURE: CPT | Performed by: FAMILY MEDICINE

## 2021-10-08 PROCEDURE — 1160F RVW MEDS BY RX/DR IN RCRD: CPT | Mod: CPTII,S$GLB,, | Performed by: FAMILY MEDICINE

## 2021-10-08 PROCEDURE — 3079F PR MOST RECENT DIASTOLIC BLOOD PRESSURE 80-89 MM HG: ICD-10-PCS | Mod: CPTII,S$GLB,, | Performed by: FAMILY MEDICINE

## 2021-10-11 PROBLEM — I82.A11: Status: ACTIVE | Noted: 2021-10-11

## 2021-10-12 ENCOUNTER — TELEPHONE (OUTPATIENT)
Dept: FAMILY MEDICINE | Facility: CLINIC | Age: 66
End: 2021-10-12

## 2021-10-13 ENCOUNTER — PATIENT MESSAGE (OUTPATIENT)
Dept: FAMILY MEDICINE | Facility: CLINIC | Age: 66
End: 2021-10-13
Payer: COMMERCIAL

## 2021-10-14 ENCOUNTER — TELEPHONE (OUTPATIENT)
Dept: FAMILY MEDICINE | Facility: CLINIC | Age: 66
End: 2021-10-14

## 2021-10-19 ENCOUNTER — HOSPITAL ENCOUNTER (OUTPATIENT)
Dept: RADIOLOGY | Facility: HOSPITAL | Age: 66
Discharge: HOME | End: 2021-10-19
Attending: FAMILY MEDICINE
Payer: COMMERCIAL

## 2021-10-19 DIAGNOSIS — I82.A11: ICD-10-CM

## 2021-10-19 PROCEDURE — 76700 US EXAM ABDOM COMPLETE: CPT | Mod: TC

## 2021-10-22 ENCOUNTER — LAB VISIT (OUTPATIENT)
Dept: LAB | Facility: HOSPITAL | Age: 66
End: 2021-10-22
Attending: FAMILY MEDICINE
Payer: COMMERCIAL

## 2021-10-22 ENCOUNTER — OFFICE VISIT (OUTPATIENT)
Dept: FAMILY MEDICINE | Facility: CLINIC | Age: 66
End: 2021-10-22
Payer: COMMERCIAL

## 2021-10-22 VITALS
HEIGHT: 73 IN | SYSTOLIC BLOOD PRESSURE: 120 MMHG | BODY MASS INDEX: 23.99 KG/M2 | OXYGEN SATURATION: 98 % | HEART RATE: 66 BPM | TEMPERATURE: 98 F | WEIGHT: 181 LBS | DIASTOLIC BLOOD PRESSURE: 89 MMHG

## 2021-10-22 DIAGNOSIS — D75.89 MACROCYTOSIS: ICD-10-CM

## 2021-10-22 DIAGNOSIS — I82.A11: Primary | ICD-10-CM

## 2021-10-22 DIAGNOSIS — I88.0 MESENTERIC ADENITIS: ICD-10-CM

## 2021-10-22 LAB
FOLATE SERPL-MCNC: 24.1 NG/ML (ref 4–24)
VIT B12 SERPL-MCNC: 409 PG/ML (ref 210–950)

## 2021-10-22 PROCEDURE — 1159F MED LIST DOCD IN RCRD: CPT | Mod: CPTII,S$GLB,, | Performed by: FAMILY MEDICINE

## 2021-10-22 PROCEDURE — 3008F PR BODY MASS INDEX (BMI) DOCUMENTED: ICD-10-PCS | Mod: CPTII,S$GLB,, | Performed by: FAMILY MEDICINE

## 2021-10-22 PROCEDURE — 3008F BODY MASS INDEX DOCD: CPT | Mod: CPTII,S$GLB,, | Performed by: FAMILY MEDICINE

## 2021-10-22 PROCEDURE — 99214 PR OFFICE/OUTPT VISIT, EST, LEVL IV, 30-39 MIN: ICD-10-PCS | Mod: S$GLB,,, | Performed by: FAMILY MEDICINE

## 2021-10-22 PROCEDURE — 1160F RVW MEDS BY RX/DR IN RCRD: CPT | Mod: CPTII,S$GLB,, | Performed by: FAMILY MEDICINE

## 2021-10-22 PROCEDURE — 36415 COLL VENOUS BLD VENIPUNCTURE: CPT | Performed by: FAMILY MEDICINE

## 2021-10-22 PROCEDURE — 82746 ASSAY OF FOLIC ACID SERUM: CPT | Performed by: FAMILY MEDICINE

## 2021-10-22 PROCEDURE — 3079F DIAST BP 80-89 MM HG: CPT | Mod: CPTII,S$GLB,, | Performed by: FAMILY MEDICINE

## 2021-10-22 PROCEDURE — 3074F PR MOST RECENT SYSTOLIC BLOOD PRESSURE < 130 MM HG: ICD-10-PCS | Mod: CPTII,S$GLB,, | Performed by: FAMILY MEDICINE

## 2021-10-22 PROCEDURE — 82607 VITAMIN B-12: CPT | Performed by: FAMILY MEDICINE

## 2021-10-22 PROCEDURE — 1160F PR REVIEW ALL MEDS BY PRESCRIBER/CLIN PHARMACIST DOCUMENTED: ICD-10-PCS | Mod: CPTII,S$GLB,, | Performed by: FAMILY MEDICINE

## 2021-10-22 PROCEDURE — 1159F PR MEDICATION LIST DOCUMENTED IN MEDICAL RECORD: ICD-10-PCS | Mod: CPTII,S$GLB,, | Performed by: FAMILY MEDICINE

## 2021-10-22 PROCEDURE — 3079F PR MOST RECENT DIASTOLIC BLOOD PRESSURE 80-89 MM HG: ICD-10-PCS | Mod: CPTII,S$GLB,, | Performed by: FAMILY MEDICINE

## 2021-10-22 PROCEDURE — 99214 OFFICE O/P EST MOD 30 MIN: CPT | Mod: S$GLB,,, | Performed by: FAMILY MEDICINE

## 2021-10-22 PROCEDURE — 3074F SYST BP LT 130 MM HG: CPT | Mod: CPTII,S$GLB,, | Performed by: FAMILY MEDICINE

## 2021-10-22 RX ORDER — RIVAROXABAN 15 MG/1
15 TABLET, FILM COATED ORAL 2 TIMES DAILY
COMMUNITY
Start: 2021-10-15 | End: 2021-11-24

## 2021-10-22 RX ORDER — RIVAROXABAN 15 MG/1
15 TABLET, FILM COATED ORAL 2 TIMES DAILY
Qty: 42 TABLET | Refills: 0 | Status: CANCELLED | OUTPATIENT
Start: 2021-10-22

## 2021-10-26 ENCOUNTER — CLINICAL SUPPORT (OUTPATIENT)
Dept: ALLERGY | Facility: CLINIC | Age: 66
End: 2021-10-26
Payer: COMMERCIAL

## 2021-10-26 ENCOUNTER — PATIENT MESSAGE (OUTPATIENT)
Dept: FAMILY MEDICINE | Facility: CLINIC | Age: 66
End: 2021-10-26
Payer: COMMERCIAL

## 2021-10-26 VITALS
SYSTOLIC BLOOD PRESSURE: 116 MMHG | HEART RATE: 71 BPM | DIASTOLIC BLOOD PRESSURE: 71 MMHG | HEIGHT: 73 IN | OXYGEN SATURATION: 97 % | WEIGHT: 181 LBS | BODY MASS INDEX: 23.99 KG/M2

## 2021-10-26 DIAGNOSIS — J30.1 NON-SEASONAL ALLERGIC RHINITIS DUE TO POLLEN: ICD-10-CM

## 2021-10-26 DIAGNOSIS — J30.1 SEASONAL ALLERGIC RHINITIS DUE TO POLLEN: ICD-10-CM

## 2021-10-26 PROCEDURE — 95117 PR IMMU2THERAPY, 2+ INJECTIONS: ICD-10-PCS | Mod: S$GLB,,, | Performed by: ALLERGY & IMMUNOLOGY

## 2021-10-26 PROCEDURE — 95117 IMMUNOTHERAPY INJECTIONS: CPT | Mod: S$GLB,,, | Performed by: ALLERGY & IMMUNOLOGY

## 2021-10-27 ENCOUNTER — HOSPITAL ENCOUNTER (OUTPATIENT)
Dept: RADIOLOGY | Facility: HOSPITAL | Age: 66
Discharge: HOME | End: 2021-10-27
Attending: FAMILY MEDICINE
Payer: COMMERCIAL

## 2021-10-27 DIAGNOSIS — D75.89 MACROCYTOSIS: ICD-10-CM

## 2021-10-27 DIAGNOSIS — I88.0 MESENTERIC ADENITIS: ICD-10-CM

## 2021-10-27 PROCEDURE — 25500020 PHARM REV CODE 255: Performed by: FAMILY MEDICINE

## 2021-10-27 PROCEDURE — 74177 CT ABD & PELVIS W/CONTRAST: CPT | Mod: TC

## 2021-10-27 RX ADMIN — IOHEXOL 100 ML: 350 INJECTION, SOLUTION INTRAVENOUS at 09:10

## 2021-11-23 ENCOUNTER — CLINICAL SUPPORT (OUTPATIENT)
Dept: ALLERGY | Facility: CLINIC | Age: 66
End: 2021-11-23
Payer: MEDICARE

## 2021-11-23 VITALS
DIASTOLIC BLOOD PRESSURE: 80 MMHG | HEIGHT: 73 IN | OXYGEN SATURATION: 98 % | WEIGHT: 181 LBS | HEART RATE: 80 BPM | SYSTOLIC BLOOD PRESSURE: 138 MMHG | TEMPERATURE: 98 F | BODY MASS INDEX: 23.99 KG/M2

## 2021-11-23 DIAGNOSIS — J30.1 NON-SEASONAL ALLERGIC RHINITIS DUE TO POLLEN: ICD-10-CM

## 2021-11-23 PROCEDURE — 95117 IMMUNOTHERAPY INJECTIONS: CPT | Mod: S$GLB,,, | Performed by: ALLERGY & IMMUNOLOGY

## 2021-11-23 PROCEDURE — 95117 PR IMMU2THERAPY, 2+ INJECTIONS: ICD-10-PCS | Mod: S$GLB,,, | Performed by: ALLERGY & IMMUNOLOGY

## 2021-11-24 ENCOUNTER — OFFICE VISIT (OUTPATIENT)
Dept: HEMATOLOGY/ONCOLOGY | Facility: CLINIC | Age: 66
End: 2021-11-24
Payer: MEDICARE

## 2021-11-24 VITALS
WEIGHT: 183 LBS | RESPIRATION RATE: 18 BRPM | HEART RATE: 73 BPM | BODY MASS INDEX: 24.25 KG/M2 | SYSTOLIC BLOOD PRESSURE: 143 MMHG | HEIGHT: 73 IN | DIASTOLIC BLOOD PRESSURE: 84 MMHG

## 2021-11-24 DIAGNOSIS — I82.A11: Primary | ICD-10-CM

## 2021-11-24 DIAGNOSIS — K73.8 OTHER CHRONIC HEPATITIS, NOT ELSEWHERE CLASSIFIED: ICD-10-CM

## 2021-11-24 DIAGNOSIS — Z12.5 ENCOUNTER FOR SCREENING FOR MALIGNANT NEOPLASM OF PROSTATE: ICD-10-CM

## 2021-11-24 DIAGNOSIS — D53.9 NUTRITIONAL ANEMIA, UNSPECIFIED: ICD-10-CM

## 2021-11-24 DIAGNOSIS — R97.8 OTHER ABNORMAL TUMOR MARKERS: ICD-10-CM

## 2021-11-24 PROCEDURE — 99203 OFFICE O/P NEW LOW 30 MIN: CPT | Mod: S$GLB,,, | Performed by: INTERNAL MEDICINE

## 2021-11-24 PROCEDURE — 99203 PR OFFICE/OUTPT VISIT, NEW, LEVL III, 30-44 MIN: ICD-10-PCS | Mod: S$GLB,,, | Performed by: INTERNAL MEDICINE

## 2021-11-30 ENCOUNTER — HOSPITAL ENCOUNTER (OUTPATIENT)
Dept: RADIOLOGY | Facility: HOSPITAL | Age: 66
Discharge: HOME | End: 2021-11-30
Attending: INTERNAL MEDICINE
Payer: MEDICARE

## 2021-11-30 DIAGNOSIS — I82.A11: ICD-10-CM

## 2021-11-30 PROCEDURE — 25500020 PHARM REV CODE 255: Performed by: INTERNAL MEDICINE

## 2021-11-30 PROCEDURE — 71260 CT THORAX DX C+: CPT | Mod: TC

## 2021-11-30 RX ADMIN — IOHEXOL 100 ML: 350 INJECTION, SOLUTION INTRAVENOUS at 10:11

## 2021-12-03 LAB
AFP-TM SERPL-MCNC: 2.2 NG/ML (ref 0–8.3)
APCR PPP: 3.2 RATIO (ref 2.2–3.5)
AT III ACT/NOR PPP CHRO: 114 % (ref 75–135)
CARDIOLIPIN IGG SER IA-ACNC: <9 GPL U/ML (ref 0–14)
CARDIOLIPIN IGM SER IA-ACNC: <9 MPL U/ML (ref 0–12)
CEA SERPL-MCNC: 0.8 NG/ML (ref 0–4.7)
CREAT SERPL-MCNC: 0.86 MG/DL (ref 0.76–1.27)
DRVVT SCREEN TO CONFIRM RATIO: 1.5 RATIO (ref 0.8–1.2)
F2 GENE MUT ANL BLD/T: NORMAL
F5 GENE MUT ANL BLD/T: NORMAL
FACT IX ACT/NOR PPP: 73 % (ref 60–177)
FACT VII AG ACT/NOR PPP IA: 77 %
FACT VIII ACT/NOR PPP: 99 % (ref 56–140)
FACT X ACT/NOR PPP: 83 % (ref 76–183)
HCYS SERPL-SCNC: 8.8 UMOL/L (ref 0–17.2)
LA 2 SCREEN W REFLEX-IMP: ABNORMAL
MIXING DRVVT: 62.9 SEC (ref 0–40.4)
MTHFR GENE MUT ANL BLD/T: NORMAL
PROT C AG ACT/NOR PPP IA: 94 % (ref 60–150)
PROT S ACT/NOR PPP: 123 % (ref 63–140)
PROT S AG ACT/NOR PPP IA: 84 % (ref 60–150)
PROT S FREE AG ACT/NOR PPP IA: 90 % (ref 61–136)
PS IGA SER-ACNC: <1 APS UNITS (ref 0–19)
PS IGG SER-ACNC: <10 UNITS (ref 0–30)
PS IGM SER-ACNC: <22 MPS IGM
PSA SERPL-MCNC: 1.4 NG/ML (ref 0–4)
SCREEN APTT: 45.9 SEC (ref 0–51.9)
SCREEN DRVVT: 79.2 SEC (ref 0–47)

## 2021-12-21 ENCOUNTER — CLINICAL SUPPORT (OUTPATIENT)
Dept: ALLERGY | Facility: CLINIC | Age: 66
End: 2021-12-21
Payer: MEDICARE

## 2021-12-21 VITALS
WEIGHT: 183 LBS | OXYGEN SATURATION: 98 % | BODY MASS INDEX: 24.25 KG/M2 | TEMPERATURE: 98 F | HEART RATE: 87 BPM | HEIGHT: 73 IN

## 2021-12-21 DIAGNOSIS — J30.1 SEASONAL ALLERGIC RHINITIS DUE TO POLLEN: ICD-10-CM

## 2021-12-21 DIAGNOSIS — J30.1 NON-SEASONAL ALLERGIC RHINITIS DUE TO POLLEN: ICD-10-CM

## 2021-12-21 PROCEDURE — 95117 IMMUNOTHERAPY INJECTIONS: CPT | Mod: S$GLB,,, | Performed by: ALLERGY & IMMUNOLOGY

## 2021-12-21 PROCEDURE — 95117 PR IMMU2THERAPY, 2+ INJECTIONS: ICD-10-PCS | Mod: S$GLB,,, | Performed by: ALLERGY & IMMUNOLOGY

## 2021-12-28 PROBLEM — R76.0 LUPUS ANTICOAGULANT POSITIVE: Status: ACTIVE | Noted: 2021-12-28

## 2021-12-29 ENCOUNTER — OFFICE VISIT (OUTPATIENT)
Dept: HEMATOLOGY/ONCOLOGY | Facility: CLINIC | Age: 66
End: 2021-12-29
Payer: MEDICARE

## 2021-12-29 VITALS
SYSTOLIC BLOOD PRESSURE: 160 MMHG | RESPIRATION RATE: 18 BRPM | HEIGHT: 73 IN | WEIGHT: 188 LBS | DIASTOLIC BLOOD PRESSURE: 81 MMHG | TEMPERATURE: 98 F | HEART RATE: 66 BPM | BODY MASS INDEX: 24.92 KG/M2

## 2021-12-29 DIAGNOSIS — D53.9 NUTRITIONAL ANEMIA, UNSPECIFIED: ICD-10-CM

## 2021-12-29 DIAGNOSIS — I82.A11: Primary | ICD-10-CM

## 2021-12-29 DIAGNOSIS — E72.12 METHYLENETETRAHYDROFOLATE REDUCTASE DEFICIENCY: ICD-10-CM

## 2021-12-29 DIAGNOSIS — Z15.89 HETEROZYGOUS MTHFR MUTATION C677T: ICD-10-CM

## 2021-12-29 DIAGNOSIS — R76.0 LUPUS ANTICOAGULANT POSITIVE: ICD-10-CM

## 2021-12-29 DIAGNOSIS — D51.8 OTHER VITAMIN B12 DEFICIENCY ANEMIAS: ICD-10-CM

## 2021-12-29 DIAGNOSIS — D75.89 MACROCYTOSIS: ICD-10-CM

## 2021-12-29 PROCEDURE — 99215 PR OFFICE/OUTPT VISIT, EST, LEVL V, 40-54 MIN: ICD-10-PCS | Mod: S$GLB,,, | Performed by: INTERNAL MEDICINE

## 2021-12-29 PROCEDURE — 99215 OFFICE O/P EST HI 40 MIN: CPT | Mod: S$GLB,,, | Performed by: INTERNAL MEDICINE

## 2022-01-12 ENCOUNTER — PATIENT MESSAGE (OUTPATIENT)
Dept: ALLERGY | Facility: CLINIC | Age: 67
End: 2022-01-12
Payer: MEDICARE

## 2022-01-19 ENCOUNTER — CLINICAL SUPPORT (OUTPATIENT)
Dept: ALLERGY | Facility: CLINIC | Age: 67
End: 2022-01-19
Payer: MEDICARE

## 2022-01-19 VITALS
TEMPERATURE: 97 F | HEART RATE: 80 BPM | DIASTOLIC BLOOD PRESSURE: 74 MMHG | HEIGHT: 73 IN | BODY MASS INDEX: 24.92 KG/M2 | SYSTOLIC BLOOD PRESSURE: 120 MMHG | OXYGEN SATURATION: 99 % | WEIGHT: 188 LBS

## 2022-01-19 DIAGNOSIS — J30.1 SEASONAL ALLERGIC RHINITIS DUE TO POLLEN: ICD-10-CM

## 2022-01-19 PROCEDURE — 95117 IMMUNOTHERAPY INJECTIONS: CPT | Mod: S$GLB,,, | Performed by: ALLERGY & IMMUNOLOGY

## 2022-01-19 PROCEDURE — 95117 PR IMMU2THERAPY, 2+ INJECTIONS: ICD-10-PCS | Mod: S$GLB,,, | Performed by: ALLERGY & IMMUNOLOGY

## 2022-01-19 NOTE — PROGRESS NOTES
Immunotherapy Note: Allergy Shot      Subjective:       Patient ID: Faisal Quiros is a 66 y.o. male presents for monthly immunotherapy  Tolerated last injection  No New Medications  Beta Blockers: not on beta blocker    Reaction with last injection?: No  If female, are you pregnant?: No  Are you on any beta blockers?: No  Has the vial ?: No    Are you ill today?: No  Asthma exacerbation or symptoms: No  Do you have a fever today?: No    Peak Flow: 620    Expiration Date #1: 22  Vial Concentration: 1:1 v/v (RED)  Dose (mL) #1: 0.5 mL  Location: Right upper arm  Given By: rw    Expiration Date #2: 22  Vial Concentration: 1:1 v/v (RED)  Dose (mL) #2: 0.5 mL  Location: Left upper arm  Given By : rw      Objective:     Faisal Quiros observed for 30 minutes and discharged in good condition        Discussion:     Pt understands the current recommendation. All questions answered to the best of my ability. Continue current management plan.      Electronically signed by Neris Babcock    Allergy/Imunology  [facility]  [place] [address] [facility]  [place] [address]   Office [phone]

## 2022-02-09 ENCOUNTER — PATIENT MESSAGE (OUTPATIENT)
Dept: FAMILY MEDICINE | Facility: CLINIC | Age: 67
End: 2022-02-09
Payer: MEDICARE

## 2022-02-16 ENCOUNTER — CLINICAL SUPPORT (OUTPATIENT)
Dept: ALLERGY | Facility: CLINIC | Age: 67
End: 2022-02-16
Payer: MEDICARE

## 2022-02-16 VITALS
HEIGHT: 73 IN | BODY MASS INDEX: 24.92 KG/M2 | HEART RATE: 78 BPM | OXYGEN SATURATION: 97 % | TEMPERATURE: 98 F | WEIGHT: 188 LBS

## 2022-02-16 DIAGNOSIS — J30.1 NON-SEASONAL ALLERGIC RHINITIS DUE TO POLLEN: ICD-10-CM

## 2022-02-16 PROCEDURE — 95117 IMMUNOTHERAPY INJECTIONS: CPT | Mod: S$GLB,,, | Performed by: ALLERGY & IMMUNOLOGY

## 2022-02-16 PROCEDURE — 95117 PR IMMU2THERAPY, 2+ INJECTIONS: ICD-10-PCS | Mod: S$GLB,,, | Performed by: ALLERGY & IMMUNOLOGY

## 2022-02-16 NOTE — PROGRESS NOTES
Immunotherapy Note: Allergy Shot      Subjective:       Patient ID: Faisal Quiros is a 66 y.o. male presents for monthly immunotherapy  Tolerated last injection  No New Medications  Beta Blockers: not on beta blocker    Reaction with last injection?: No  If female, are you pregnant?: No  Are you on any beta blockers?: No  Has the vial ?: No    Are you ill today?: No  Asthma exacerbation or symptoms: No  Do you have a fever today?: No    Peak Flow: 610    Expiration Date #1: 22  Vial Concentration: 1:1 v/v (RED)  Dose (mL) #1: 0.5 mL  Location: Right upper arm  Given By: EL    Expiration Date #2: 22  Vial Concentration: 1:1 v/v (RED)  Dose (mL) #2: 0.5 mL  Location: Left upper arm  Given By : EL      Objective:     Faisal Quiros observed for 30 minutes and discharged in good condition        Discussion:     Pt understands the current recommendation. All questions answered to the best of my ability. Continue current management plan.      Electronically signed by Fara Mcconnell    Allergy/Imunology  [facility]  [place] [address]  [place] [address]   Office [phone]

## 2022-02-18 ENCOUNTER — PATIENT MESSAGE (OUTPATIENT)
Dept: FAMILY MEDICINE | Facility: CLINIC | Age: 67
End: 2022-02-18
Payer: MEDICARE

## 2022-02-23 DIAGNOSIS — D84.9 IMMUNOSUPPRESSED STATUS: ICD-10-CM

## 2022-03-16 ENCOUNTER — CLINICAL SUPPORT (OUTPATIENT)
Dept: ALLERGY | Facility: CLINIC | Age: 67
End: 2022-03-16
Payer: MEDICARE

## 2022-03-16 VITALS
HEIGHT: 73 IN | WEIGHT: 188 LBS | OXYGEN SATURATION: 98 % | DIASTOLIC BLOOD PRESSURE: 70 MMHG | BODY MASS INDEX: 24.92 KG/M2 | SYSTOLIC BLOOD PRESSURE: 122 MMHG | HEART RATE: 70 BPM

## 2022-03-16 DIAGNOSIS — J30.1 SEASONAL ALLERGIC RHINITIS DUE TO POLLEN: ICD-10-CM

## 2022-03-16 DIAGNOSIS — J30.1 NON-SEASONAL ALLERGIC RHINITIS DUE TO POLLEN: ICD-10-CM

## 2022-03-16 PROCEDURE — 95117 IMMUNOTHERAPY INJECTIONS: CPT | Mod: S$GLB,,, | Performed by: ALLERGY & IMMUNOLOGY

## 2022-03-16 PROCEDURE — 95117 PR IMMU2THERAPY, 2+ INJECTIONS: ICD-10-PCS | Mod: S$GLB,,, | Performed by: ALLERGY & IMMUNOLOGY

## 2022-03-16 RX ORDER — LEVOCETIRIZINE DIHYDROCHLORIDE 5 MG/1
TABLET, FILM COATED ORAL
Qty: 60 TABLET | Refills: 3 | Status: SHIPPED | OUTPATIENT
Start: 2022-03-16 | End: 2023-01-04

## 2022-03-16 NOTE — PROGRESS NOTES
Immunotherapy Note: Allergy Shot      Subjective:       Patient ID: Faisal Quiros is a 66 y.o. male presents for monthly immunotherapy  Tolerated last injection  No New Medications  Beta Blockers: not on beta blocker    Reaction with last injection?: No  If female, are you pregnant?: No  Are you on any beta blockers?: No  Has the vial ?: No    Are you ill today?: No  Asthma exacerbation or symptoms: No  Do you have a fever today?: No         Expiration Date #1: 22  Vial Concentration: 1:1 v/v (RED)  Dose (mL) #1: 0.5 mL  Location: Right upper arm  Given By: RW    Expiration Date #2: 22  Vial Concentration: 1:1 v/v (RED)  Dose (mL) #2: 0.5 mL  Location: Left upper arm  Given By : rw      Objective:     Faisal Quiros observed for 30 minutes and discharged in good condition        Discussion:     Pt understands the current recommendation. All questions answered to the best of my ability. Continue current management plan.      Electronically signed by Neris Babcock    Allergy/Imunology  [facility]  [facility]  [address]  [place] [address]   Office [phone]

## 2022-03-30 ENCOUNTER — PATIENT MESSAGE (OUTPATIENT)
Dept: HEMATOLOGY/ONCOLOGY | Facility: CLINIC | Age: 67
End: 2022-03-30

## 2022-04-06 ENCOUNTER — HOSPITAL ENCOUNTER (OUTPATIENT)
Dept: PREADMISSION TESTING | Facility: HOSPITAL | Age: 67
Discharge: HOME | End: 2022-04-06
Attending: INTERNAL MEDICINE
Payer: MEDICARE

## 2022-04-06 VITALS
RESPIRATION RATE: 16 BRPM | HEIGHT: 73 IN | BODY MASS INDEX: 23.19 KG/M2 | DIASTOLIC BLOOD PRESSURE: 90 MMHG | TEMPERATURE: 98 F | OXYGEN SATURATION: 99 % | HEART RATE: 60 BPM | SYSTOLIC BLOOD PRESSURE: 137 MMHG | WEIGHT: 175 LBS

## 2022-04-06 RX ORDER — GUAIFENESIN 600 MG/1
1200 TABLET, EXTENDED RELEASE ORAL
COMMUNITY
End: 2022-05-30

## 2022-04-06 NOTE — DISCHARGE INSTRUCTIONS
To confirm, Your doctor has instructed you that surgery is scheduled for: Friday April 8, 2022    Pre-Op will call the afternoon prior to surgery  with the final arrival time. 4/7/22     Please report to [facility] [place] via [facility] entrance, proceed to Registration.     Do not eat or drink anything after midnight the night before your surgery - THIS INCLUDES  WATER, GUM, MINTS AND CANDY.  YOU MAY BRUSH YOUR TEETH BUT DO NOT SWALLOW     PLEASE NOTE:  The surgery schedule has many variables which may affect the time of your surgery case.  Family members should be available if your surgery time changes.  Plan to be here the day of your procedure between 4-6 hours.    DO NOT TAKE THESE MEDICATIONS 5-7 DAYS PRIOR to your procedure or per your surgeon's request: ASPIRIN, ALEVE, ADVIL, IBUPROFEN,  DYLAN SELTZER, BC , FISH OIL , VITAMIN E, HERBALS  (May take Tylenol)                                              IMPORTANT INSTRUCTIONS    Do not smoke, vape or drink alcoholic beverages 24 hours prior to your procedure.  Shower the night before AND the morning of your procedure with a Chlorhexidine wash such as Hibiclens or Dial antibacterial soap from the neck down.    Do not get it on your face or in your eyes.  You may use your own shampoo and face wash. This helps your skin to be as bacteria free as possible.    DO NOT remove hair from the surgery site.  Do not shave the incision site unless you are given specific instructions to do so.    Sleep in a bed with clean sheets.  Do not sleep with a pet in the bed.   If you wear contact lenses, dentures, hearing aids or glasses, bring a container to put them in during surgery and give to a family member for safe keeping.    Please leave all jewelry, piercing's and valuables at home.     Make arrangements in advance for transportation home by a responsible adult.    You must make arrangements for transportation, TAXI'S, UBER'S OR LYFTS ARE NOT ALLOWED.        If you have  any questions about these instructions, call Pre-Op Admit  Nursing at [phone] ,  Endoscopy Department at [phone]

## 2022-04-07 LAB
ALBUMIN SERPL-MCNC: 4.4 G/DL (ref 3.8–4.8)
ALBUMIN/GLOB SERPL: 1.8 {RATIO} (ref 1.2–2.2)
ALP SERPL-CCNC: 87 IU/L (ref 44–121)
ALT SERPL-CCNC: 26 IU/L (ref 0–44)
AST SERPL-CCNC: 31 IU/L (ref 0–40)
BASOPHILS # BLD AUTO: 0 X10E3/UL (ref 0–0.2)
BASOPHILS NFR BLD AUTO: 1 %
BILIRUB SERPL-MCNC: 0.6 MG/DL (ref 0–1.2)
BUN SERPL-MCNC: 18 MG/DL (ref 8–27)
BUN/CREAT SERPL: 20 (ref 10–24)
CALCIUM SERPL-MCNC: 8.8 MG/DL (ref 8.6–10.2)
CHLORIDE SERPL-SCNC: 104 MMOL/L (ref 96–106)
CO2 SERPL-SCNC: 23 MMOL/L (ref 20–29)
CREAT SERPL-MCNC: 0.92 MG/DL (ref 0.76–1.27)
EOSINOPHIL # BLD AUTO: 0.1 X10E3/UL (ref 0–0.4)
EOSINOPHIL NFR BLD AUTO: 1 %
ERYTHROCYTE [DISTWIDTH] IN BLOOD BY AUTOMATED COUNT: 12.7 % (ref 11.6–15.4)
EST. GFR  (NO RACE VARIABLE): 92 ML/MIN/1.73
FOLATE BLD-MCNC: >620 NG/ML
FOLATE RBC-MCNC: >1325 NG/ML
FOLATE SERPL-MCNC: >20 NG/ML
GLOBULIN SER CALC-MCNC: 2.5 G/DL (ref 1.5–4.5)
GLUCOSE SERPL-MCNC: 98 MG/DL (ref 65–99)
HCT VFR BLD AUTO: 46.8 % (ref 37.5–51)
HCYS SERPL-SCNC: 7.8 UMOL/L (ref 0–17.2)
HGB BLD-MCNC: 15.6 G/DL (ref 13–17.7)
IMM GRANULOCYTES # BLD AUTO: 0 X10E3/UL (ref 0–0.1)
IMM GRANULOCYTES NFR BLD AUTO: 1 %
LA 2 SCREEN W REFLEX-IMP: NORMAL
LYMPHOCYTES # BLD AUTO: 1.8 X10E3/UL (ref 0.7–3.1)
LYMPHOCYTES NFR BLD AUTO: 32 %
MCH RBC QN AUTO: 32.8 PG (ref 26.6–33)
MCHC RBC AUTO-ENTMCNC: 33.3 G/DL (ref 31.5–35.7)
MCV RBC AUTO: 98 FL (ref 79–97)
METHYLMALONATE SERPL-SCNC: 200 NMOL/L (ref 0–378)
MONOCYTES # BLD AUTO: 0.4 X10E3/UL (ref 0.1–0.9)
MONOCYTES NFR BLD AUTO: 8 %
NEUTROPHILS # BLD AUTO: 3.1 X10E3/UL (ref 1.4–7)
NEUTROPHILS NFR BLD AUTO: 57 %
PLATELET # BLD AUTO: 251 X10E3/UL (ref 150–450)
POTASSIUM SERPL-SCNC: 3.9 MMOL/L (ref 3.5–5.2)
PROT SERPL-MCNC: 6.9 G/DL (ref 6–8.5)
RBC # BLD AUTO: 4.76 X10E6/UL (ref 4.14–5.8)
SCREEN APTT: 33.4 SEC (ref 0–51.9)
SCREEN DRVVT: 26 SEC (ref 0–47)
SODIUM SERPL-SCNC: 141 MMOL/L (ref 134–144)
VIT B12 SERPL-MCNC: 911 PG/ML (ref 232–1245)
WBC # BLD AUTO: 5.5 X10E3/UL (ref 3.4–10.8)

## 2022-04-08 ENCOUNTER — HOSPITAL ENCOUNTER (OUTPATIENT)
Facility: HOSPITAL | Age: 67
Discharge: HOME | End: 2022-04-08
Attending: INTERNAL MEDICINE | Admitting: INTERNAL MEDICINE
Payer: MEDICARE

## 2022-04-08 VITALS
SYSTOLIC BLOOD PRESSURE: 167 MMHG | WEIGHT: 178 LBS | DIASTOLIC BLOOD PRESSURE: 87 MMHG | TEMPERATURE: 98 F | HEART RATE: 65 BPM | HEIGHT: 73 IN | OXYGEN SATURATION: 99 % | RESPIRATION RATE: 12 BRPM | BODY MASS INDEX: 23.59 KG/M2

## 2022-04-08 DIAGNOSIS — K51.00 ULCERATIVE (CHRONIC) ENTEROCOLITIS: ICD-10-CM

## 2022-04-08 PROCEDURE — 45330 DIAGNOSTIC SIGMOIDOSCOPY: CPT | Performed by: INTERNAL MEDICINE

## 2022-04-08 NOTE — H&P
GASTROENTEROLOGY PRE-PROCEDURE H&P NOTE  Patient Name: Faisal Quiros  Patient MRN: [id number]  Patient : 1955    Service date: 2022    PCP: Milan Chen III, MD    No chief complaint on file.      HPI: Patient is a 66 y.o. male with PMHx as below here for evaluation of ulcerative colitis.  He has had this total colectomy.  With ileocolonic anastomosis.  He is brought in today for surveillance.    Past Medical History:  Past Medical History:   Diagnosis Date    Allergic rhinitis     Back pain     Cancer     skin    Hx of seasonal allergies     Legionella pneumonia     Lupus anticoagulant positive 2021 pt states negative    Right shoulder pain 2018    surg scheduled    Seborrhea-like dermatitis with psoriasiform elements     Sleep apnea 2017    uses cpap nightly    SVT (supraventricular tachycardia) 2017    Transfusion reaction     hep c-- s & treated        Past Surgical History:  Past Surgical History:   Procedure Laterality Date    APPENDECTOMY      CHOLECYSTECTOMY      LUMBAR DISC SURGERY  2007    pet scan      right shoulder surgery   and 2019    x2     SHOULDER ARTHROSCOPY Right 2019    Procedure: ARTHROSCOPY, SHOULDER;  Surgeon: Stone Suarez MD;  [facility]: [facility];  Service: Orthopedics;  Laterality: Right;  ARTHREX NOTIFIED    total proctocolectomy  1985        Home Medications:  Facility-Administered Medications Prior to Admission   Medication Dose Route Frequency Provider Last Rate Last Admin    Allergy Mix   Subcutaneous Q30 Days Karen Parker MD   Given at 22 0850     Medications Prior to Admission   Medication Sig Dispense Refill Last Dose    guaiFENesin (MUCINEX) 600 mg 12 hr tablet Take 1,200 mg by mouth as needed for Congestion.   2022 at Unknown time    levocetirizine (XYZAL) 5 MG tablet TAKE 1 TABLET BY MOUTH TWICE A DAY 60 tablet 3 2022 at Unknown time    folic acid-vit B6-vit B12 2.5-25-2 mg (FOLBIC OR EQUIV)  "2.5-25-2 mg Tab Take 1 tablet by mouth once daily. 30 tablet 6     ketoconazole (NIZORAL) 2 % shampoo Apply topically.          Inpatient Medications:        Review of patient's allergies indicates:  No Known Allergies    Social History:   Social History     Occupational History    Not on file   Tobacco Use    Smoking status: Never Smoker    Smokeless tobacco: Never Used   Substance and Sexual Activity    Alcohol use: No    Drug use: No    Sexual activity: Not on file       Family History:   Family History   Problem Relation Age of Onset    Allergic rhinitis Neg Hx     Allergies Neg Hx     Angioedema Neg Hx     Asthma Neg Hx     Eczema Neg Hx     Atopy Neg Hx     Immunodeficiency Neg Hx     Rhinitis Neg Hx     Urticaria Neg Hx        Review of Systems:  A 10 point review of systems was performed and was normal, except as mentioned in the HPI, including constitutional, HEENT, heme, lymph, cardiovascular, respiratory, gastrointestinal, genitourinary, neurologic, endocrine, psychiatric and musculoskeletal.      OBJECTIVE:    Physical Exam:  24 Hour Vital Sign Ranges: Temp:  [98.2 °F (36.8 °C)] 98.2 °F (36.8 °C)  Pulse:  [64] 64  Resp:  [12] 12  SpO2:  [100 %] 100 %  BP: (170)/(88) 170/88  Most recent vitals: BP (!) 170/88 (BP Location: Left arm, Patient Position: Lying)   Pulse 64   Temp 98.2 °F (36.8 °C) (Oral)   Resp 12   Ht 6' 1" (1.854 m)   Wt 80.7 kg (178 lb)   SpO2 100%   BMI 23.48 kg/m²    GEN: well-developed, well-nourished, awake and alert, non-toxic appearing adult  HEENT: PERRL, sclera anicteric, oral mucosa pink and moist without lesion  NECK: trachea midline; Good ROM  CV: regular rate and rhythm, no murmurs or gallops  RESP: clear to auscultation bilaterally, no wheezes, rhonci or rales  ABD: soft, non-tender, non-distended, normal bowel sounds  EXT: no swelling or edema, 2+ pulses distally  SKIN: no rashes or jaundice  PSYCH: normal affect    Labs:   No results for input(s): WBC, " MCV, PLT in the last 72 hours.    Invalid input(s): HGBAU  No results for input(s): NA, K, CL, CO2, BUN, GLU in the last 72 hours.    Invalid input(s): CREA  No results for input(s): ALB in the last 72 hours.    Invalid input(s): ALKP, SGOT, SGPT, TBIL, DBIL, TPRO  No results for input(s): PT, INR, PTT in the last 72 hours.      IMPRESSION / RECOMMENDATIONS:  Longstanding ulcerative colitis.  Plan is flexible sigmoidoscopy.  Further recommendations to follow.  RIsks, benefits, alternatives discussed in detail regarding upcoming procedures and sedation. Some of the more common endoscopic complications include but not limited to immediate or delayed perforation, bleeding, infections, pain, inadvertent injury to surrounding tissue / organs and possible need for surgical evaluation. Patient expressed understanding, all questions answered and will proceed with procedure as planned.     Michael Avalos  4/8/2022  7:13 AM

## 2022-04-08 NOTE — PROVATION PATIENT INSTRUCTIONS
Discharge Summary/Instructions after an Endoscopic Procedure  Patient Name: Faisal Quiros  Patient MRN: [id number]  Patient YOB: 1955 Friday, April 8, 2022  Michael Avalos MD  RESTRICTIONS:  During your procedure today, you received medications for sedation.  These   medications may affect your judgment, balance and coordination.  Therefore,   for 24 hours, you have the following restrictions:   - DO NOT drive a car, operate machinery, make legal/financial decisions,   sign important papers or drink alcohol.    ACTIVITY:  Today: no heavy lifting, straining or running due to procedural   sedation/anesthesia.  The following day: return to full activity including work.  DIET:  Eat and drink normally unless instructed otherwise.     TREATMENT FOR COMMON SIDE EFFECTS:  - Mild abdominal pain, nausea, belching, bloating or excessive gas:  rest,   eat lightly and use a heating pad.  - Sore Throat: treat with throat lozenges and/or gargle with warm salt   water.  - Because air was used during the procedure, expelling large amounts of air   from your rectum or belching is normal.  - If a bowel prep was taken, you may not have a bowel movement for 1-3 days.    This is normal.  SYMPTOMS TO WATCH FOR AND REPORT TO YOUR PHYSICIAN:  1. Abdominal pain or bloating, other than gas cramps.  2. Chest pain.  3. Back pain.  4. Signs of infection such as: chills or fever occurring within 24 hours   after the procedure.  5. Rectal bleeding, which would show as bright red, maroon, or black stools.   (A tablespoon of blood from the rectum is not serious, especially if   hemorrhoids are present.)  6. Vomiting.  7. Weakness or dizziness.  GO DIRECTLY TO THE NEAREST EMERGENCY ROOM IF YOU HAVE ANY OF THE FOLLOWING:      Difficulty breathing              Chills and/or fever over 101 F   Persistent vomiting and/or vomiting blood   Severe abdominal pain   Severe chest pain   Black, tarry stools   Bleeding- more than one  tablespoon   Any other symptom or condition that you feel may need urgent attention  Your doctor recommends these additional instructions:  If any biopsies were taken, your doctors clinic will contact you in 1 to 2   weeks with any results.  - Discharge patient to home (ambulatory).   - Repeat flexible sigmoidoscopy in 3 years for surveillance.  For questions, problems or results please call your physician - Michael Avalos MD at Work:  [phone].  [facility], EMERGENCY ROOM PHONE NUMBER: [phone]  IF A COMPLICATION OR EMERGENCY SITUATION ARISES AND YOU ARE UNABLE TO REACH   YOUR PHYSICIAN - GO DIRECTLY TO THE EMERGENCY ROOM.  Michael Avalos MD  4/8/2022 7:30:05 AM  This report has been verified and signed electronically.  Dear patient,  As a result of recent federal legislation (The Federal Cures Act), you may   receive lab or pathology results from your procedure in your [facility]   account before your physician is able to contact you. Your physician or   their representative will relay the results to you with their   recommendations at their soonest availability.  Thank you,  [employer]ATION

## 2022-04-11 ENCOUNTER — PATIENT MESSAGE (OUTPATIENT)
Dept: HEMATOLOGY/ONCOLOGY | Facility: CLINIC | Age: 67
End: 2022-04-11

## 2022-04-13 ENCOUNTER — CLINICAL SUPPORT (OUTPATIENT)
Dept: ALLERGY | Facility: CLINIC | Age: 67
End: 2022-04-13
Payer: MEDICARE

## 2022-04-13 VITALS
HEIGHT: 73 IN | OXYGEN SATURATION: 98 % | WEIGHT: 178 LBS | SYSTOLIC BLOOD PRESSURE: 120 MMHG | HEART RATE: 62 BPM | DIASTOLIC BLOOD PRESSURE: 78 MMHG | TEMPERATURE: 97 F | BODY MASS INDEX: 23.59 KG/M2

## 2022-04-13 DIAGNOSIS — J30.1 NON-SEASONAL ALLERGIC RHINITIS DUE TO POLLEN: ICD-10-CM

## 2022-04-13 PROCEDURE — 95117 PR IMMU2THERAPY, 2+ INJECTIONS: ICD-10-PCS | Mod: S$GLB,,, | Performed by: ALLERGY & IMMUNOLOGY

## 2022-04-13 PROCEDURE — 95117 IMMUNOTHERAPY INJECTIONS: CPT | Mod: S$GLB,,, | Performed by: ALLERGY & IMMUNOLOGY

## 2022-04-13 NOTE — PROGRESS NOTES
Immunotherapy Note: Allergy Shot      Subjective:       Patient ID: Faisal Quiros is a 66 y.o. male presents for monthly immunotherapy  Tolerated last injection  No New Medications  Beta Blockers: not on beta blocker    Reaction with last injection?: No  If female, are you pregnant?: No  Are you on any beta blockers?: No  Has the vial ?: No    Are you ill today?: No  Asthma exacerbation or symptoms: No  Do you have a fever today?: No    Peak Flow: 650    Expiration Date #1: 22  Vial Concentration: 1:1 v/v (RED)  Dose (mL) #1: 0.5 mL  Location: Right upper arm  Given By: RW    Expiration Date #2: 22  Vial Concentration: 1:1 v/v (RED)  Dose (mL) #2: 0.5 mL  Location: Left upper arm  Given By : RW      Objective:     Faisal Quiros observed for 30 minutes and discharged in good condition        Discussion:     Pt understands the current recommendation. All questions answered to the best of my ability. Continue current management plan.      Electronically signed by Neris Babcock LPN    Allergy/Imunology  [facility]  [address]  [place] [address]   Office [phone]

## 2022-04-17 ENCOUNTER — OFFICE VISIT (OUTPATIENT)
Dept: URGENT CARE | Facility: CLINIC | Age: 67
End: 2022-04-17
Payer: MEDICARE

## 2022-04-17 VITALS
HEIGHT: 73 IN | HEART RATE: 67 BPM | BODY MASS INDEX: 23.99 KG/M2 | TEMPERATURE: 98 F | OXYGEN SATURATION: 98 % | WEIGHT: 181 LBS | DIASTOLIC BLOOD PRESSURE: 80 MMHG | RESPIRATION RATE: 12 BRPM | SYSTOLIC BLOOD PRESSURE: 126 MMHG

## 2022-04-17 DIAGNOSIS — H60.502 ACUTE OTITIS EXTERNA OF LEFT EAR, UNSPECIFIED TYPE: Primary | ICD-10-CM

## 2022-04-17 DIAGNOSIS — J35.8 TONSIL STONE: ICD-10-CM

## 2022-04-17 PROCEDURE — 1159F MED LIST DOCD IN RCRD: CPT | Mod: CPTII,S$GLB,, | Performed by: NURSE PRACTITIONER

## 2022-04-17 PROCEDURE — 3074F SYST BP LT 130 MM HG: CPT | Mod: CPTII,S$GLB,, | Performed by: NURSE PRACTITIONER

## 2022-04-17 PROCEDURE — 1160F RVW MEDS BY RX/DR IN RCRD: CPT | Mod: CPTII,S$GLB,, | Performed by: NURSE PRACTITIONER

## 2022-04-17 PROCEDURE — 1160F PR REVIEW ALL MEDS BY PRESCRIBER/CLIN PHARMACIST DOCUMENTED: ICD-10-PCS | Mod: CPTII,S$GLB,, | Performed by: NURSE PRACTITIONER

## 2022-04-17 PROCEDURE — 99203 PR OFFICE/OUTPT VISIT, NEW, LEVL III, 30-44 MIN: ICD-10-PCS | Mod: S$GLB,,, | Performed by: NURSE PRACTITIONER

## 2022-04-17 PROCEDURE — 1159F PR MEDICATION LIST DOCUMENTED IN MEDICAL RECORD: ICD-10-PCS | Mod: CPTII,S$GLB,, | Performed by: NURSE PRACTITIONER

## 2022-04-17 PROCEDURE — 3074F PR MOST RECENT SYSTOLIC BLOOD PRESSURE < 130 MM HG: ICD-10-PCS | Mod: CPTII,S$GLB,, | Performed by: NURSE PRACTITIONER

## 2022-04-17 PROCEDURE — 3079F PR MOST RECENT DIASTOLIC BLOOD PRESSURE 80-89 MM HG: ICD-10-PCS | Mod: CPTII,S$GLB,, | Performed by: NURSE PRACTITIONER

## 2022-04-17 PROCEDURE — 3008F PR BODY MASS INDEX (BMI) DOCUMENTED: ICD-10-PCS | Mod: CPTII,S$GLB,, | Performed by: NURSE PRACTITIONER

## 2022-04-17 PROCEDURE — 3008F BODY MASS INDEX DOCD: CPT | Mod: CPTII,S$GLB,, | Performed by: NURSE PRACTITIONER

## 2022-04-17 PROCEDURE — 99203 OFFICE O/P NEW LOW 30 MIN: CPT | Mod: S$GLB,,, | Performed by: NURSE PRACTITIONER

## 2022-04-17 PROCEDURE — 3079F DIAST BP 80-89 MM HG: CPT | Mod: CPTII,S$GLB,, | Performed by: NURSE PRACTITIONER

## 2022-04-17 RX ORDER — CIPROFLOXACIN AND DEXAMETHASONE 3; 1 MG/ML; MG/ML
4 SUSPENSION/ DROPS AURICULAR (OTIC) 2 TIMES DAILY
Qty: 7.5 ML | Refills: 0 | Status: SHIPPED | OUTPATIENT
Start: 2022-04-17 | End: 2022-04-22

## 2022-04-17 NOTE — PATIENT INSTRUCTIONS
For tonsil stones, good oral hygiene, increase your fluid intake to include mostly water.  Store bought mouthwash with peroxide base 3 to 4 times a day for the next 3-4 days then twice a day for maintenance.  If unable to remove tonsil stone follow-up with ENT.  If Ciprodex fails to improve symptoms of left ear return to clinic, follow-up with PCP or follow-up with ENT for further evaluation

## 2022-04-17 NOTE — PROGRESS NOTES
"Subjective:       Patient ID: Faisal Quiros is a 66 y.o. male.    Vitals:  height is 6' 1" (1.854 m) and weight is 82.1 kg (181 lb). His temperature is 98.2 °F (36.8 °C). His blood pressure is 126/80 and his pulse is 67. His respiration is 12 and oxygen saturation is 98%.     Chief Complaint: Otalgia    Went swimming at the gym yesterday and started using some new ear buds recently.   Swims daily at the gym for exercise and believes he may be experiencing symptoms of swimmer's ear.  Discomfort started last night and had difficulty sleeping due to left ear discomfort with contact to bed/below.  Discomfort radiates down left anterior neck that worsens with swallowing    Otalgia   There is pain in the left ear. This is a new problem. The current episode started yesterday. The problem occurs constantly. Pertinent negatives include no coughing, diarrhea, ear discharge, rash, sore throat or vomiting.       Constitution: Negative for chills and fever.   HENT: Positive for ear pain. Negative for ear discharge, foreign body in ear, congestion, sore throat and trouble swallowing (Discomfort left side with swallowing).    Respiratory: Negative for cough.    Gastrointestinal: Negative for vomiting and diarrhea.   Skin: Negative for rash.       Objective:      Physical Exam   Constitutional: He is oriented to person, place, and time. He appears well-developed.  Non-toxic appearance. He does not appear ill. No distress.   HENT:   Head: Normocephalic and atraumatic.   Ears:   Right Ear: Tympanic membrane normal.   Left Ear: Tympanic membrane normal.      Comments: Left ear canal with mild erythema and vascular streaking more so than right.  However no edema or drainage noted   Nose: Nose normal.   Mouth/Throat: Uvula is midline and oropharynx is clear and moist. No oropharyngeal exudate, posterior oropharyngeal edema, posterior oropharyngeal erythema or tonsillar abscesses. No tonsillar exudate.       Eyes: Conjunctivae and EOM " are normal. Right eye exhibits no discharge. Left eye exhibits no discharge.   Neck: Neck supple. No neck rigidity present.   Abdominal: Normal appearance.   Musculoskeletal:      Cervical back: He exhibits no tenderness.   Lymphadenopathy:     He has no cervical adenopathy.   Neurological: no focal deficit. He is alert and oriented to person, place, and time.   Skin: Skin is warm, dry and not diaphoretic. Capillary refill takes 2 to 3 seconds.   Psychiatric: His behavior is normal. Mood normal.   Nursing note and vitals reviewed.        Assessment:       1. Acute otitis externa of left ear, unspecified type    2. Tonsil stone          Plan:         Acute otitis externa of left ear, unspecified type  -     ciprofloxacin-dexamethasone 0.3-0.1% (CIPRODEX) 0.3-0.1 % DrpS; Place 4 drops into the left ear 2 (two) times daily. for 5 days  Dispense: 7.5 mL; Refill: 0    Tonsil stone  Comments:  Left    For tonsil stones, good oral hygiene, increase your fluid intake to include mostly water.  Store bought mouthwash with peroxide base 3 to 4 times a day for the next 3-4 days then twice a day for maintenance.  If unable to remove tonsil stone follow-up with ENT.  If Ciprodex fails to improve symptoms of left ear return to clinic, follow-up with PCP or follow-up with ENT for further evaluation

## 2022-05-11 ENCOUNTER — CLINICAL SUPPORT (OUTPATIENT)
Dept: ALLERGY | Facility: CLINIC | Age: 67
End: 2022-05-11
Payer: MEDICARE

## 2022-05-11 VITALS
BODY MASS INDEX: 23.99 KG/M2 | DIASTOLIC BLOOD PRESSURE: 76 MMHG | SYSTOLIC BLOOD PRESSURE: 138 MMHG | HEART RATE: 79 BPM | WEIGHT: 181 LBS | HEIGHT: 73 IN | OXYGEN SATURATION: 98 % | TEMPERATURE: 98 F

## 2022-05-11 DIAGNOSIS — J30.1 SEASONAL ALLERGIC RHINITIS DUE TO POLLEN: ICD-10-CM

## 2022-05-11 PROCEDURE — 95117 PR IMMU2THERAPY, 2+ INJECTIONS: ICD-10-PCS | Mod: S$GLB,,, | Performed by: ALLERGY & IMMUNOLOGY

## 2022-05-11 PROCEDURE — 95117 IMMUNOTHERAPY INJECTIONS: CPT | Mod: S$GLB,,, | Performed by: ALLERGY & IMMUNOLOGY

## 2022-05-11 NOTE — PROGRESS NOTES
Immunotherapy Note: Allergy Shot      Subjective:       Patient ID: Faisal Quiros is a 66 y.o. male presents for monthly immunotherapy  Tolerated last injection  No New Medications  Beta Blockers: not on beta blocker    Reaction with last injection?: No  If female, are you pregnant?: No  Are you on any beta blockers?: No  Has the vial ?: No    Are you ill today?: No  Asthma exacerbation or symptoms: No  Do you have a fever today?: No    Peak Flow: 580    Expiration Date #1: 22  Vial Concentration: 1:1 v/v (RED)  Dose (mL) #1: 0.5 mL  Location: Right upper arm  Given By: rw    Expiration Date #2: 22  Vial Concentration: 1:1 v/v (RED)  Dose (mL) #2: 0.5 mL  Location: Left upper arm  Given By : rw      Objective:     Faisal Quiros observed for 30 minutes and discharged in good condition        Discussion:     Pt understands the current recommendation. All questions answered to the best of my ability. Continue current management plan.      Electronically signed by Neris Babcock LPN    Allergy/Imunology  [facility]  [address]  [place] [address]   Office [phone]

## 2022-05-12 ENCOUNTER — PATIENT MESSAGE (OUTPATIENT)
Dept: ADMINISTRATIVE | Facility: HOSPITAL | Age: 67
End: 2022-05-12
Payer: MEDICARE

## 2022-05-16 NOTE — PROGRESS NOTES
"[facility] Hematology/Oncology  PROGRESS NOTE -  Follow-up Visit      Subjective:       Patient ID:   NAME: Faisal Quiros : 1955     66 y.o. male    Referring Doc: Milan Chen III, *  Other Physicians: Keith Duenas Albright; Goyo Lui           Chief Complaint: Rght axillary vein clot f/u     History of Present Illness:     Patient returns today for a regularly scheduled follow-up visit.  The patient is here today to go over the results of the recently ordered labs, tests and studies. He is here by himself.     He is feeling "great". Breathing ok. No excessive bleeding or bruising.    He saw Dr Avalos on 2022 and had scope which was clear. Hep C PCR was good      On flomax per Dr Teixeira    He sees Dr Chen in the near future    Discussed covid 19 precautions - s/p vaccinations          ROS:   GEN: normal without any fever, night sweats or weight loss  HEENT: normal with no HA's, sore throat, stiff neck, changes in vision  CV: normal with no CP, SOB, PND, MAK or orthopnea  PULM: normal with no SOB, cough, hemoptysis, sputum or pleuritic pain  GI: normal with no abdominal pain, nausea, vomiting, constipation, diarrhea, melanotic stools, BRBPR, or hematemesis  : normal with no hematuria, dysuria  BREAST: normal with no mass, discharge, pain  SKIN: normal with no rash, erythema, bruising, or swelling    Pain Scale:  0    Allergies:  Review of patient's allergies indicates:  No Known Allergies    Medications:    Current Outpatient Medications:     folic acid-vit B6-vit B12 2.5-25-2 mg (FOLBIC OR EQUIV) 2.5-25-2 mg Tab, Take 1 tablet by mouth once daily. (Patient not taking: Reported on 2022), Disp: 30 tablet, Rfl: 6    guaiFENesin (MUCINEX) 600 mg 12 hr tablet, Take 1,200 mg by mouth as needed for Congestion., Disp: , Rfl:     ketoconazole (NIZORAL) 2 % shampoo, Apply topically., Disp: , Rfl:     levocetirizine (XYZAL) 5 MG tablet, TAKE 1 TABLET BY MOUTH TWICE A DAY, Disp: 60 " "tablet, Rfl: 3    tamsulosin (FLOMAX) 0.4 mg Cap, , Disp: , Rfl:     Current Facility-Administered Medications:     Allergy Mix, , Subcutaneous, Q30 Days, Karen Parker MD, Given at 05/11/22 0835    PMHx/PSHx Updates:  See patient's last visit with me on 12/29/2021.  See H&P on 11/24/2021        Pathology:  Cancer Staging  No matching staging information was found for the patient.          Objective:     Vitals:  Blood pressure 133/85, pulse 77, temperature 98.4 °F (36.9 °C), resp. rate 18, height 6' 1" (1.854 m), weight 81.2 kg (179 lb).    Physical Examination:   GEN: no apparent distress, comfortable; AAOx3  HEAD: atraumatic and normocephalic  EYES: no pallor, no icterus, PERRLA  ENT: OMM, no pharyngeal erythema, external ears WNL; no nasal discharge; no thrush  NECK: no masses, thyroid normal, trachea midline, no LAD/LN's, supple  CV: RRR with no murmur; normal pulse; normal S1 and S2; no pedal edema  CHEST: Normal respiratory effort; CTAB; normal breath sounds; no wheeze or crackles  ABDOM: nontender and nondistended; soft; normal bowel sounds; no rebound/guarding  MUSC/Skeletal: ROM normal; no crepitus; joints normal; no deformities or arthropathy  EXTREM: no clubbing, cyanosis, inflammation or swelling  SKIN: no rashes, lesions, ulcers, petechiae or subcutaneous nodules  : no casarez  NEURO: grossly intact; motor/sensory WNL; AAOx3; no tremors  PSYCH: normal mood, affect and behavior  LYMPH: normal cervical, supraclavicular, axillary and groin LN's            Labs:     Lab Results   Component Value Date    WBC 5.5 03/31/2022    HGB 15.6 03/31/2022    HCT 46.8 03/31/2022    MCV 98 (H) 03/31/2022     03/31/2022       CMP  Sodium   Date Value Ref Range Status   03/31/2022 141 134 - 144 mmol/L Final   10/22/2018 142 134 - 144 mmol/L      Potassium   Date Value Ref Range Status   03/31/2022 3.9 3.5 - 5.2 mmol/L Final     Chloride   Date Value Ref Range Status   03/31/2022 104 96 - 106 mmol/L Final "   10/22/2018 107 98 - 110 mmol/L      CO2   Date Value Ref Range Status   03/31/2022 23 20 - 29 mmol/L Final     Glucose   Date Value Ref Range Status   03/31/2022 98 65 - 99 mg/dL Final   10/22/2018 100 (H) 70 - 99 mg/dL      BUN   Date Value Ref Range Status   03/31/2022 18 8 - 27 mg/dL Final     Creatinine   Date Value Ref Range Status   03/31/2022 0.92 0.76 - 1.27 mg/dL Final   10/22/2018 0.75 0.60 - 1.40 mg/dL      Calcium   Date Value Ref Range Status   03/31/2022 8.8 8.6 - 10.2 mg/dL Final     Total Protein   Date Value Ref Range Status   10/08/2021 7.4 6.0 - 8.4 g/dL Final     Albumin   Date Value Ref Range Status   03/31/2022 4.4 3.8 - 4.8 g/dL Final   10/08/2021 4.2 3.5 - 5.2 g/dL Final   10/22/2018 4.0 3.1 - 4.7 g/dL      Total Bilirubin   Date Value Ref Range Status   03/31/2022 0.6 0.0 - 1.2 mg/dL Final     Alkaline Phosphatase   Date Value Ref Range Status   10/08/2021 77 55 - 135 U/L Final     AST   Date Value Ref Range Status   03/31/2022 31 0 - 40 IU/L Final     ALT   Date Value Ref Range Status   03/31/2022 26 0 - 44 IU/L Final     Anion Gap   Date Value Ref Range Status   10/08/2021 8 8 - 16 mmol/L Final     eGFR if    Date Value Ref Range Status   10/08/2021 >60.0 >60 mL/min/1.73 m^2 Final     eGFR if non    Date Value Ref Range Status   11/24/2021 91 >59 mL/min/1.73 Final         Homocyst(e)ine 0.0 - 17.2 umol/L 7.8          Folate, Hemolysate Not Estab. ng/mL >620.0    Folate (packed RBC's) >498 ng/mL >1325      Methylmalonic Acid 0 - 378 nmol/L 200        PTT Lupus Anticoagulant 0.0 - 51.9 sec 33.4  45.9    Dilute Viper Venom Time 0.0 - 47.0 sec 26.0  79.2 High     Interpretation  Comment:  Comment: CM    Comment: No lupus anticoagulant was detected           Factor V Leiden Comment    Comment: Result:  Negative (no mutation found)      AFP-Tumor Marker 0.0 - 8.3 ng/mL 2.2      PSA - [facility] 0.0 - 4.0 ng/mL 1.4      CEA 0.0 - 4.7 ng/mL 0.8     MTHFR Comment     Comment: Result:   c.665C>T (p. [id number]), legacy name: [id number] - Detected, heterozygous   c.1286A>C (p. [id number]), legacy name: [id number] - Not Detected   Interpretation:      Antithrombin Activity 75 - 135 % 114            Radiology/Diagnostic Studies:    CT Chest With Contrast    Result Date: 11/30/2021  CMS MANDATED QUALITY DATA - CT RADIATION 436 All CT scans at this facility utilize dose modulation, iterative reconstruction, and/or weight based dosing when appropriate to reduce radiation dose to as low as reasonably achievable. CLINICAL HISTORY: 66 years (1955) Male clot TECHNIQUE: CT CHEST WITH IV CONTRAST. 342 images obtained. Axial CT images of the chest were obtained after the uneventful administration of IV contrast. Soft tissue and lung windows were sent for review. Sagittal and coronal reformats were performed by the radiologist. CONTRAST: 100 mL of IV Omni 350 was administered uneventfully by IV COMPARISON: Ultrasound from 8/10/2021 FINDINGS:. Visualized neck: Within normal limits. Lungs: The lungs are essentially clear with no concerning pulmonary nodule or mass. There is a 6 mm calcified granuloma in the central left upper lobe (image 64). Airway: The trachea and central bronchial tree appear normal. Pleura: There is no pleural effusion. There is no pneumothorax. Cardiovascular: The heart is normal in size. There is no pericardial effusion. The thoracic aorta and great vessels are normal in course and caliber. Note that the contrast bolus timing is not targeted for evaluation of pulmonary embolus or deep venous thrombosis in the upper extremities. Mediastinum: There is no supraclavicular, axillary, mediastinal, or hilar lymphadenopathy. Soft tissues: The peripheral soft tissues appear normal. Musculoskeletal: There are moderate degenerative changes of the thoracic spine.  There are no suspicious osseous lesions. Esophagus: The esophagus appears grossly normal. Upper Abdomen: No acute abnormality  is seen in the upper abdomen. Relative diffuse low-attenuation liver in keeping with steatosis. Rounded low-attenuation structures are seen in the liver, incompletely assessed on this nontargeted exam, but suggestive of cysts. Partially imaged is a 5.5 cm simple fluid attenuation structure in the left kidney favored to represent a cyst. IMPRESSION: 1. No acute cardiac or pulmonary process. 2. No concerning pulmonary nodule, mass or lymphadenopathy to suggest malignancy. 3. Additional, and incidental findings as outlined above. . Electronically signed by:  Manfred Silva MD  2021 10:37 AM [facility] Workstation: [id number]      CT Abdom/pelvis 10/22/2021:     IMPRESSION:     1.  Patchy haziness of the central mesenteric fat with multiple shoddy lymph nodes a similar appearance compared with exam from 2017. 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  2.  Postsurgical changes of colectomy noted.  3.  Multiple hepatic and renal cysts.  4.  Mild prostatomegaly.     US Abdom  10/19/2021:     IMPRESSION:     1.  Cholelithiasis.  2.  Mild bilateral caliectasis versus hydronephrosis.  3.  Bilateral simple renal cysts.  4.  Septated hepatic cyst in the right liver lobe.        US Upper extremity:  10/8/2021     IMPRESSION:  Deep venous thrombosis in the right upper extremity at the level of the axillary vein       I have reviewed all available lab results and radiology reports.    Assessment/Plan:   (1) 66 y.o. male with diagnosis of right axillary vein clot who has been referred by Milan Chen III, * for evaluation by medical hematology/oncology.     -He is on xarelto 20mg po daily. He developed a chord under the right arm/axilla and noticed it while taking a shower. He reports that he did not have any pain at first. He saw his PCP and had US which showed a clot.  -  No prior personal or family history of clots.   - His dad  of stroke but he was of advanced age.   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 - seen by Dr Lui in past     (5) Eczema and seborrhea-like dermatitis     (6) SEAN     (7) SVT     (8) UC hx with total colectomy in 1985 - followed by Dr Duenas and Dr Avalos with GI     (9) Allergy/Hives issues - on injections with Dr Parker      VISIT DIAGNOSES:      Lupus anticoagulant positive    Acute venous embolism and thrombosis of axillary veins, right  -     CBC Auto Differential; Standing  -     Comprehensive Metabolic Panel; Standing  -     Homocysteine, Serum; Standing    History of colectomy    Heterozygous for MTHFR gene mutation  -     CBC Auto Differential; Standing  -     Comprehensive Metabolic Panel; Standing  -     Homocysteine, Serum; Standing    Methylenetetrahydrofolate reductase deficiency   -     Homocysteine, Serum; Standing          PLAN:  1. F/u with PCP  2. Continue folbic or folbic equivalent supplements  4. Check homocysteine level every 6 months'  5. F/u with PCP, Pulm, GI, etc    RTC in 6 months  Fax note to Milan Chen III, *; Keith Avalos Schuette    Discussion:     COVID-19 Discussion:     I had long discussion with patient and any applicable family about the COVID-19 coronavirus epidemic and the recommended precautions with regard to cancer and/or hematology patients. I have re-iterated the CDC recommendations for adequate hand washing, use of hand  -like products, and coughing into elbow, etc. In addition, especially for our patients who are on chemotherapy and/or our otherwise immunocompromised patients, I have recommended avoidance of crowds, including movie theaters, restaurants, churches, etc. I have recommended avoidance of any sick or symptomatic family members and/or friends. I have also recommended avoidance of any raw and unwashed food products, and general avoidance of food items that have not been prepared by themselves. The patient has been asked to call us immediately with any symptom developments, issues, questions or other general concerns.      Anticoagulation Discussion:     Discussed with patient and any applicable family members about the benefit and/or need for anticoagulation. I communicated about the risks of bleeding while on any anticoagulation, which could be serious and/or life-threatening, and which can occur at any time, regardless of degree of the level of anticoagulation. I expressed the need for compliance with any anticoagulation regimen and that failure to do so could potential lead to excessive bleeding, and risk to health and/or life. In particular, with patients on coumadin therapy, compliance with requested blood work is absolutely essential, as coumadin levels can vary from time to time, and failure to do so could potentially place the patient at risk for bleeding and/or clotting events which could be fatal. Patients on coumadin are encouraged to call the day after they have their levels drawn, as to obtain the appropriate instructions from my staff. Patients are aware that self-regulating or self-dosing of their medications is strictly prohibited.      I spent over 25 mins of time with the patient. Reviewed results of the recently ordered labs, tests and studies; made directives with regards to the results. Over half of this time was spent couseling and coordinating care.    I have explained all of the above in  detail and the patient understands all of the current recommendation(s). I have answered all of their questions to the best of my ability and to their complete satisfaction.   The patient is to continue with the current management plan.            Electronically signed by Ronen Blackman MD                 Answers for HPI/ROS submitted by the patient on 5/13/2022  appetite change : No  unexpected weight change: No  mouth sores: No  visual disturbance: No  cough: No  shortness of breath: No  chest pain: No  abdominal pain: No  diarrhea: No  frequency: No  back pain: No  rash: No  headaches: No  adenopathy: No  nervous/ anxious: No

## 2022-05-17 ENCOUNTER — OFFICE VISIT (OUTPATIENT)
Dept: HEMATOLOGY/ONCOLOGY | Facility: CLINIC | Age: 67
End: 2022-05-17
Payer: MEDICARE

## 2022-05-17 ENCOUNTER — PATIENT MESSAGE (OUTPATIENT)
Dept: FAMILY MEDICINE | Facility: CLINIC | Age: 67
End: 2022-05-17
Payer: MEDICARE

## 2022-05-17 VITALS
HEIGHT: 73 IN | TEMPERATURE: 98 F | DIASTOLIC BLOOD PRESSURE: 85 MMHG | SYSTOLIC BLOOD PRESSURE: 133 MMHG | RESPIRATION RATE: 18 BRPM | BODY MASS INDEX: 23.72 KG/M2 | HEART RATE: 77 BPM | WEIGHT: 179 LBS

## 2022-05-17 DIAGNOSIS — Z90.49 HISTORY OF COLECTOMY: ICD-10-CM

## 2022-05-17 DIAGNOSIS — Z15.89 HETEROZYGOUS FOR MTHFR GENE MUTATION: ICD-10-CM

## 2022-05-17 DIAGNOSIS — E72.12 METHYLENETETRAHYDROFOLATE REDUCTASE DEFICIENCY: ICD-10-CM

## 2022-05-17 DIAGNOSIS — I82.A11: ICD-10-CM

## 2022-05-17 DIAGNOSIS — R76.0 LUPUS ANTICOAGULANT POSITIVE: Primary | ICD-10-CM

## 2022-05-17 DIAGNOSIS — Z00.00 WELLNESS EXAMINATION: Primary | ICD-10-CM

## 2022-05-17 PROCEDURE — 1126F PR PAIN SEVERITY QUANTIFIED, NO PAIN PRESENT: ICD-10-PCS | Mod: CPTII,S$GLB,, | Performed by: INTERNAL MEDICINE

## 2022-05-17 PROCEDURE — 1101F PT FALLS ASSESS-DOCD LE1/YR: CPT | Mod: CPTII,S$GLB,, | Performed by: INTERNAL MEDICINE

## 2022-05-17 PROCEDURE — 1160F RVW MEDS BY RX/DR IN RCRD: CPT | Mod: CPTII,S$GLB,, | Performed by: INTERNAL MEDICINE

## 2022-05-17 PROCEDURE — 3008F BODY MASS INDEX DOCD: CPT | Mod: CPTII,S$GLB,, | Performed by: INTERNAL MEDICINE

## 2022-05-17 PROCEDURE — 99213 PR OFFICE/OUTPT VISIT, EST, LEVL III, 20-29 MIN: ICD-10-PCS | Mod: S$GLB,,, | Performed by: INTERNAL MEDICINE

## 2022-05-17 PROCEDURE — 3079F PR MOST RECENT DIASTOLIC BLOOD PRESSURE 80-89 MM HG: ICD-10-PCS | Mod: CPTII,S$GLB,, | Performed by: INTERNAL MEDICINE

## 2022-05-17 PROCEDURE — 3008F PR BODY MASS INDEX (BMI) DOCUMENTED: ICD-10-PCS | Mod: CPTII,S$GLB,, | Performed by: INTERNAL MEDICINE

## 2022-05-17 PROCEDURE — 3288F PR FALLS RISK ASSESSMENT DOCUMENTED: ICD-10-PCS | Mod: CPTII,S$GLB,, | Performed by: INTERNAL MEDICINE

## 2022-05-17 PROCEDURE — 3079F DIAST BP 80-89 MM HG: CPT | Mod: CPTII,S$GLB,, | Performed by: INTERNAL MEDICINE

## 2022-05-17 PROCEDURE — 99213 OFFICE O/P EST LOW 20 MIN: CPT | Mod: S$GLB,,, | Performed by: INTERNAL MEDICINE

## 2022-05-17 PROCEDURE — 3288F FALL RISK ASSESSMENT DOCD: CPT | Mod: CPTII,S$GLB,, | Performed by: INTERNAL MEDICINE

## 2022-05-17 PROCEDURE — 1126F AMNT PAIN NOTED NONE PRSNT: CPT | Mod: CPTII,S$GLB,, | Performed by: INTERNAL MEDICINE

## 2022-05-17 PROCEDURE — 1101F PR PT FALLS ASSESS DOC 0-1 FALLS W/OUT INJ PAST YR: ICD-10-PCS | Mod: CPTII,S$GLB,, | Performed by: INTERNAL MEDICINE

## 2022-05-17 PROCEDURE — 1159F MED LIST DOCD IN RCRD: CPT | Mod: CPTII,S$GLB,, | Performed by: INTERNAL MEDICINE

## 2022-05-17 PROCEDURE — 1160F PR REVIEW ALL MEDS BY PRESCRIBER/CLIN PHARMACIST DOCUMENTED: ICD-10-PCS | Mod: CPTII,S$GLB,, | Performed by: INTERNAL MEDICINE

## 2022-05-17 PROCEDURE — 3075F SYST BP GE 130 - 139MM HG: CPT | Mod: CPTII,S$GLB,, | Performed by: INTERNAL MEDICINE

## 2022-05-17 PROCEDURE — 3075F PR MOST RECENT SYSTOLIC BLOOD PRESS GE 130-139MM HG: ICD-10-PCS | Mod: CPTII,S$GLB,, | Performed by: INTERNAL MEDICINE

## 2022-05-17 PROCEDURE — 1159F PR MEDICATION LIST DOCUMENTED IN MEDICAL RECORD: ICD-10-PCS | Mod: CPTII,S$GLB,, | Performed by: INTERNAL MEDICINE

## 2022-05-17 RX ORDER — TAMSULOSIN HYDROCHLORIDE 0.4 MG/1
CAPSULE ORAL
COMMUNITY
Start: 2022-05-11 | End: 2022-06-22

## 2022-05-18 DIAGNOSIS — Z00.00 WELLNESS EXAMINATION: Primary | ICD-10-CM

## 2022-05-18 DIAGNOSIS — Z13.220 SCREENING CHOLESTEROL LEVEL: ICD-10-CM

## 2022-05-18 DIAGNOSIS — E78.5 HYPERLIPIDEMIA, UNSPECIFIED HYPERLIPIDEMIA TYPE: Primary | ICD-10-CM

## 2022-05-18 DIAGNOSIS — Z00.00 PHYSICAL EXAM: ICD-10-CM

## 2022-05-18 DIAGNOSIS — E78.5 HYPERLIPIDEMIA, UNSPECIFIED HYPERLIPIDEMIA TYPE: ICD-10-CM

## 2022-05-27 LAB
CHOLEST SERPL-MCNC: 164 MG/DL (ref 100–199)
HDLC SERPL-MCNC: 57 MG/DL
LDLC SERPL CALC-MCNC: 84 MG/DL (ref 0–99)
TRIGL SERPL-MCNC: 133 MG/DL (ref 0–149)
VLDLC SERPL CALC-MCNC: 23 MG/DL (ref 5–40)

## 2022-05-30 ENCOUNTER — OFFICE VISIT (OUTPATIENT)
Dept: FAMILY MEDICINE | Facility: CLINIC | Age: 67
End: 2022-05-30
Payer: MEDICARE

## 2022-05-30 VITALS
SYSTOLIC BLOOD PRESSURE: 126 MMHG | OXYGEN SATURATION: 99 % | WEIGHT: 176 LBS | HEIGHT: 73 IN | BODY MASS INDEX: 23.33 KG/M2 | DIASTOLIC BLOOD PRESSURE: 74 MMHG | TEMPERATURE: 98 F | HEART RATE: 73 BPM

## 2022-05-30 DIAGNOSIS — Z15.89 MTHFR MUTATION: ICD-10-CM

## 2022-05-30 DIAGNOSIS — Z00.00 PHYSICAL EXAM: Primary | ICD-10-CM

## 2022-05-30 DIAGNOSIS — Z87.19 HISTORY OF ULCERATIVE COLITIS: ICD-10-CM

## 2022-05-30 DIAGNOSIS — N40.0 BENIGN PROSTATIC HYPERPLASIA, UNSPECIFIED WHETHER LOWER URINARY TRACT SYMPTOMS PRESENT: ICD-10-CM

## 2022-05-30 DIAGNOSIS — Z90.49 HISTORY OF COLECTOMY: ICD-10-CM

## 2022-05-30 PROCEDURE — 99397 PER PM REEVAL EST PAT 65+ YR: CPT | Mod: S$GLB,,, | Performed by: FAMILY MEDICINE

## 2022-05-30 PROCEDURE — 3288F FALL RISK ASSESSMENT DOCD: CPT | Mod: CPTII,S$GLB,, | Performed by: FAMILY MEDICINE

## 2022-05-30 PROCEDURE — 3008F PR BODY MASS INDEX (BMI) DOCUMENTED: ICD-10-PCS | Mod: CPTII,S$GLB,, | Performed by: FAMILY MEDICINE

## 2022-05-30 PROCEDURE — 99397 PR PREVENTIVE VISIT,EST,65 & OVER: ICD-10-PCS | Mod: S$GLB,,, | Performed by: FAMILY MEDICINE

## 2022-05-30 PROCEDURE — 3008F BODY MASS INDEX DOCD: CPT | Mod: CPTII,S$GLB,, | Performed by: FAMILY MEDICINE

## 2022-05-30 PROCEDURE — 1126F PR PAIN SEVERITY QUANTIFIED, NO PAIN PRESENT: ICD-10-PCS | Mod: CPTII,S$GLB,, | Performed by: FAMILY MEDICINE

## 2022-05-30 PROCEDURE — 1160F RVW MEDS BY RX/DR IN RCRD: CPT | Mod: CPTII,S$GLB,, | Performed by: FAMILY MEDICINE

## 2022-05-30 PROCEDURE — 1159F PR MEDICATION LIST DOCUMENTED IN MEDICAL RECORD: ICD-10-PCS | Mod: CPTII,S$GLB,, | Performed by: FAMILY MEDICINE

## 2022-05-30 PROCEDURE — 3074F PR MOST RECENT SYSTOLIC BLOOD PRESSURE < 130 MM HG: ICD-10-PCS | Mod: CPTII,S$GLB,, | Performed by: FAMILY MEDICINE

## 2022-05-30 PROCEDURE — 1160F PR REVIEW ALL MEDS BY PRESCRIBER/CLIN PHARMACIST DOCUMENTED: ICD-10-PCS | Mod: CPTII,S$GLB,, | Performed by: FAMILY MEDICINE

## 2022-05-30 PROCEDURE — 3078F DIAST BP <80 MM HG: CPT | Mod: CPTII,S$GLB,, | Performed by: FAMILY MEDICINE

## 2022-05-30 PROCEDURE — 1101F PR PT FALLS ASSESS DOC 0-1 FALLS W/OUT INJ PAST YR: ICD-10-PCS | Mod: CPTII,S$GLB,, | Performed by: FAMILY MEDICINE

## 2022-05-30 PROCEDURE — 1126F AMNT PAIN NOTED NONE PRSNT: CPT | Mod: CPTII,S$GLB,, | Performed by: FAMILY MEDICINE

## 2022-05-30 PROCEDURE — 1159F MED LIST DOCD IN RCRD: CPT | Mod: CPTII,S$GLB,, | Performed by: FAMILY MEDICINE

## 2022-05-30 PROCEDURE — 1101F PT FALLS ASSESS-DOCD LE1/YR: CPT | Mod: CPTII,S$GLB,, | Performed by: FAMILY MEDICINE

## 2022-05-30 PROCEDURE — 3078F PR MOST RECENT DIASTOLIC BLOOD PRESSURE < 80 MM HG: ICD-10-PCS | Mod: CPTII,S$GLB,, | Performed by: FAMILY MEDICINE

## 2022-05-30 PROCEDURE — 3074F SYST BP LT 130 MM HG: CPT | Mod: CPTII,S$GLB,, | Performed by: FAMILY MEDICINE

## 2022-05-30 PROCEDURE — 3288F PR FALLS RISK ASSESSMENT DOCUMENTED: ICD-10-PCS | Mod: CPTII,S$GLB,, | Performed by: FAMILY MEDICINE

## 2022-05-31 NOTE — PROGRESS NOTES
Subjective:       Patient ID: Faisal Quiros is a 66 y.o. male.    Chief Complaint: Medicare AWV    Follow-up for physical.    Social history nonsmoker no significant alcohol intake.  Exercising regularly.    Immunizations COVID vaccine is current.  Needs tetanus diptheria shot.    Family history unchanged.    Past medical history.  Status post colectomy for ulcerative colitis.  Positive MTHFR mutation.    Laboratory studies cholesterol 164 triglycerides 133 HDL 57 LDL 84. Vitamin B12 911 up from 409.  Homocystine 7.8.  CMP is normal and CBC is normal.  PSA 1.4 in November.      Review of Systems   Constitutional: Negative.    HENT: Negative.    Eyes: Negative.    Respiratory: Negative.    Cardiovascular: Negative.    Gastrointestinal: Negative.    Endocrine: Negative.    Genitourinary: Negative.    Musculoskeletal: Negative.    Skin: Negative.    Neurological: Negative.    Hematological: Negative.    Psychiatric/Behavioral: Negative.        Objective:      Physical Exam  Vitals reviewed.   Constitutional:       Appearance: He is well-developed.   HENT:      Head: Normocephalic and atraumatic.   Eyes:      Conjunctiva/sclera: Conjunctivae normal.      Pupils: Pupils are equal, round, and reactive to light.   Cardiovascular:      Rate and Rhythm: Normal rate and regular rhythm.      Heart sounds: Normal heart sounds.   Pulmonary:      Effort: Pulmonary effort is normal.      Breath sounds: Normal breath sounds.   Abdominal:      General: Bowel sounds are normal.      Palpations: Abdomen is soft. There is no mass.      Tenderness: There is no abdominal tenderness.   Musculoskeletal:      Cervical back: Normal range of motion.   Skin:     General: Skin is warm and dry.         Assessment:       1. Physical exam    2. BMI 23.0-23.9, adult    3. MTHFR mutation    4. History of colectomy    5. History of ulcerative colitis        Plan:       Physical exam    BMI 23.0-23.9, adult    MTHFR mutation    History of  colectomy    History of ulcerative colitis      Tetanus diptheria shot recommended.  Continue current medications.  Follow-up p.r.n..

## 2022-06-08 ENCOUNTER — CLINICAL SUPPORT (OUTPATIENT)
Dept: ALLERGY | Facility: CLINIC | Age: 67
End: 2022-06-08
Payer: MEDICARE

## 2022-06-08 VITALS
TEMPERATURE: 98 F | HEIGHT: 73 IN | HEART RATE: 73 BPM | DIASTOLIC BLOOD PRESSURE: 70 MMHG | BODY MASS INDEX: 23.33 KG/M2 | OXYGEN SATURATION: 98 % | SYSTOLIC BLOOD PRESSURE: 124 MMHG | WEIGHT: 176 LBS

## 2022-06-08 DIAGNOSIS — J30.1 SEASONAL ALLERGIC RHINITIS DUE TO POLLEN: ICD-10-CM

## 2022-06-08 PROCEDURE — 95117 IMMUNOTHERAPY INJECTIONS: CPT | Mod: S$GLB,,, | Performed by: ALLERGY & IMMUNOLOGY

## 2022-06-08 PROCEDURE — 95117 PR IMMU2THERAPY, 2+ INJECTIONS: ICD-10-PCS | Mod: S$GLB,,, | Performed by: ALLERGY & IMMUNOLOGY

## 2022-06-08 NOTE — PROGRESS NOTES
Immunotherapy Note: Allergy Shot      Subjective:       Patient ID: Faisal Quiros is a 66 y.o. male presents for monthly immunotherapy  Tolerated last injection  No New Medications  Beta Blockers: not on beta blocker    Reaction with last injection?: No  If female, are you pregnant?: No  Are you on any beta blockers?: No  Has the vial ?: No    Are you ill today?: No  Asthma exacerbation or symptoms: No  Do you have a fever today?: No    Peak Flow: 640    Expiration Date #1: 22  Vial Concentration: 1:1 v/v (RED)  Dose (mL) #1: 0.5 mL  Location: Right upper arm  Given By: rw    Expiration Date #2: 22  Vial Concentration: 1:1 v/v (RED)  Dose (mL) #2: 0.5 mL  Location: Left upper arm  Given By : rw      Objective:     Faisal Quiros observed for 30 minutes and discharged in good condition        Discussion:     Pt understands the current recommendation. All questions answered to the best of my ability. Continue current management plan.      Electronically signed by Neris Babcock LPN    Allergy/Imunology  [facility]  [address]  [place] [address]   Office [phone]

## 2022-06-10 ENCOUNTER — DOCUMENTATION ONLY (OUTPATIENT)
Dept: ALLERGY | Facility: CLINIC | Age: 67
End: 2022-06-10
Payer: MEDICARE

## 2022-06-10 DIAGNOSIS — J30.1 SEASONAL ALLERGIC RHINITIS DUE TO POLLEN: Primary | ICD-10-CM

## 2022-06-10 PROCEDURE — 95165 ANTIGEN THERAPY SERVICES: CPT | Mod: S$GLB,,, | Performed by: ALLERGY & IMMUNOLOGY

## 2022-06-10 PROCEDURE — 95165 PR PROFES SVC,IMMUNOTHER,SINGLE/MULT AGS: ICD-10-PCS | Mod: S$GLB,,, | Performed by: ALLERGY & IMMUNOLOGY

## 2022-06-10 NOTE — PROGRESS NOTES
Using 70% alcohol clean surface was prepped. Sterile drape then covered clean space.  Hands were scrubbed and placed into sterile gloves. Gown, mask, and hair net worn according to  guidelines allergist exemption.    Dos: 6-    20 doses         Karen Parker M.D.  Allergy/Immunology  [facility]   [phone] phone  [phone] fax

## 2022-06-13 ENCOUNTER — PATIENT MESSAGE (OUTPATIENT)
Dept: FAMILY MEDICINE | Facility: CLINIC | Age: 67
End: 2022-06-13
Payer: MEDICARE

## 2022-06-21 ENCOUNTER — HOSPITAL ENCOUNTER (OUTPATIENT)
Facility: HOSPITAL | Age: 67
Discharge: HOME | End: 2022-06-22
Attending: EMERGENCY MEDICINE | Admitting: INTERNAL MEDICINE
Payer: MEDICARE

## 2022-06-21 DIAGNOSIS — R55 SYNCOPE: ICD-10-CM
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
## 2022-06-21 DIAGNOSIS — R55 SYNCOPE AND COLLAPSE: ICD-10-CM

## 2022-06-21 LAB
ALBUMIN SERPL BCP-MCNC: 3.8 G/DL (ref 3.5–5.2)
ALP SERPL-CCNC: 75 U/L (ref 55–135)
ALT SERPL W/O P-5'-P-CCNC: 27 U/L (ref 10–44)
ANION GAP SERPL CALC-SCNC: 7 MMOL/L (ref 8–16)
AST SERPL-CCNC: 23 U/L (ref 10–40)
BASOPHILS # BLD AUTO: 0.03 K/UL (ref 0–0.2)
BASOPHILS NFR BLD: 0.3 % (ref 0–1.9)
BILIRUB SERPL-MCNC: 0.9 MG/DL (ref 0.1–1)
BUN SERPL-MCNC: 13 MG/DL (ref 8–23)
CALCIUM SERPL-MCNC: 8.7 MG/DL (ref 8.7–10.5)
CHLORIDE SERPL-SCNC: 102 MMOL/L (ref 95–110)
CO2 SERPL-SCNC: 28 MMOL/L (ref 23–29)
CREAT SERPL-MCNC: 0.9 MG/DL (ref 0.5–1.4)
DIFFERENTIAL METHOD: ABNORMAL
EOSINOPHIL # BLD AUTO: 0 K/UL (ref 0–0.5)
EOSINOPHIL NFR BLD: 0.4 % (ref 0–8)
ERYTHROCYTE [DISTWIDTH] IN BLOOD BY AUTOMATED COUNT: 12.3 % (ref 11.5–14.5)
EST. GFR  (AFRICAN AMERICAN): >60 ML/MIN/1.73 M^2
EST. GFR  (NON AFRICAN AMERICAN): >60 ML/MIN/1.73 M^2
GLUCOSE SERPL-MCNC: 131 MG/DL (ref 70–110)
GLUCOSE SERPL-MCNC: 139 MG/DL (ref 70–110)
HCT VFR BLD AUTO: 44.4 % (ref 40–54)
HGB BLD-MCNC: 14.9 G/DL (ref 14–18)
IMM GRANULOCYTES # BLD AUTO: 0.06 K/UL (ref 0–0.04)
IMM GRANULOCYTES NFR BLD AUTO: 0.5 % (ref 0–0.5)
LYMPHOCYTES # BLD AUTO: 1.1 K/UL (ref 1–4.8)
LYMPHOCYTES NFR BLD: 9.8 % (ref 18–48)
MCH RBC QN AUTO: 32.7 PG (ref 27–31)
MCHC RBC AUTO-ENTMCNC: 33.6 G/DL (ref 32–36)
MCV RBC AUTO: 97 FL (ref 82–98)
MONOCYTES # BLD AUTO: 0.9 K/UL (ref 0.3–1)
MONOCYTES NFR BLD: 7.7 % (ref 4–15)
NEUTROPHILS # BLD AUTO: 9.1 K/UL (ref 1.8–7.7)
NEUTROPHILS NFR BLD: 81.3 % (ref 38–73)
NRBC BLD-RTO: 0 /100 WBC
PLATELET # BLD AUTO: 199 K/UL (ref 150–450)
PMV BLD AUTO: 9.5 FL (ref 9.2–12.9)
POTASSIUM SERPL-SCNC: 3.8 MMOL/L (ref 3.5–5.1)
PROT SERPL-MCNC: 6.7 G/DL (ref 6–8.4)
RBC # BLD AUTO: 4.56 M/UL (ref 4.6–6.2)
SARS-COV-2 RDRP RESP QL NAA+PROBE: NEGATIVE
SODIUM SERPL-SCNC: 137 MMOL/L (ref 136–145)
WBC # BLD AUTO: 11.22 K/UL (ref 3.9–12.7)

## 2022-06-21 PROCEDURE — 25000003 PHARM REV CODE 250: Performed by: INTERNAL MEDICINE

## 2022-06-21 PROCEDURE — 93010 ELECTROCARDIOGRAM REPORT: CPT | Mod: ,,, | Performed by: SPECIALIST

## 2022-06-21 PROCEDURE — 93005 ELECTROCARDIOGRAM TRACING: CPT | Performed by: SPECIALIST

## 2022-06-21 PROCEDURE — 99285 EMERGENCY DEPT VISIT HI MDM: CPT | Mod: 25

## 2022-06-21 PROCEDURE — 82962 GLUCOSE BLOOD TEST: CPT

## 2022-06-21 PROCEDURE — 85025 COMPLETE CBC W/AUTO DIFF WBC: CPT | Performed by: EMERGENCY MEDICINE

## 2022-06-21 PROCEDURE — G0378 HOSPITAL OBSERVATION PER HR: HCPCS

## 2022-06-21 PROCEDURE — 93010 EKG 12-LEAD: ICD-10-PCS | Mod: ,,, | Performed by: SPECIALIST

## 2022-06-21 PROCEDURE — 80053 COMPREHEN METABOLIC PANEL: CPT | Performed by: EMERGENCY MEDICINE

## 2022-06-21 PROCEDURE — U0002 COVID-19 LAB TEST NON-CDC: HCPCS | Performed by: EMERGENCY MEDICINE

## 2022-06-21 PROCEDURE — 63600175 PHARM REV CODE 636 W HCPCS: Performed by: INTERNAL MEDICINE

## 2022-06-21 RX ORDER — SODIUM CHLORIDE 9 MG/ML
INJECTION, SOLUTION INTRAVENOUS CONTINUOUS
Status: DISCONTINUED | OUTPATIENT
Start: 2022-06-21 | End: 2022-06-22 | Stop reason: HOSPADM

## 2022-06-21 RX ORDER — PREDNISONE 20 MG/1
40 TABLET ORAL DAILY
Status: DISCONTINUED | OUTPATIENT
Start: 2022-06-21 | End: 2022-06-22 | Stop reason: HOSPADM

## 2022-06-21 RX ORDER — GLUCAGON 1 MG
1 KIT INJECTION
Status: DISCONTINUED | OUTPATIENT
Start: 2022-06-21 | End: 2022-06-22 | Stop reason: HOSPADM

## 2022-06-21 RX ORDER — ACETAMINOPHEN 325 MG/1
650 TABLET ORAL EVERY 4 HOURS PRN
Status: DISCONTINUED | OUTPATIENT
Start: 2022-06-21 | End: 2022-06-22 | Stop reason: HOSPADM

## 2022-06-21 RX ADMIN — SODIUM CHLORIDE: 0.9 INJECTION, SOLUTION INTRAVENOUS at 02:06

## 2022-06-21 RX ADMIN — PREDNISONE 40 MG: 20 TABLET ORAL at 02:06

## 2022-06-21 RX ADMIN — ACETAMINOPHEN 650 MG: 325 TABLET ORAL at 09:06

## 2022-06-21 NOTE — HPI
66 year old patient getting admitted with syncope and collapse  Patient has been suffering from severe URTI like infection for past several days  Today while he was in bathroom , passing urine, pt felt dizzy and had a fall  Patient recollect all events and denies hitting head on ground  Denies chest pain or other issues

## 2022-06-21 NOTE — ASSESSMENT & PLAN NOTE
Mostly vasovagal syncope  In view with persisting URTI , middle ear inflammation needs to be r/o  Will start on low dose steroids  CT Head WNL  EKG showed sinus rhythm with first degree block which has been there since 2019  Possible Home tomorrow AM

## 2022-06-21 NOTE — H&P
Critical access hospital Medicine  History & Physical    Patient Name: Faisal Quiros  MRN: [id number]  Patient Class: OP- Observation  Admission Date: 6/21/2022  Attending Physician: Kvng Brewer MD   Primary Care Provider: Milan Chen III, MD         Patient information was obtained from patient and ER records.     Subjective:     Principal Problem:Syncope and collapse    Chief Complaint:   Chief Complaint   Patient presents with    Fall     Got weak and passed out, hit his head while standing to urinate.        HPI: 66 year old patient getting admitted with syncope and collapse  Patient has been suffering from severe URTI like infection for past several days  Today while he was in bathroom , passing urine, pt felt dizzy and had a fall  Patient recollect all events and denies hitting head on ground  Denies chest pain or other issues       Past Medical History:   Diagnosis Date    Allergic rhinitis     Back pain     Cancer     skin    Hx of seasonal allergies 2000    Legionella pneumonia     Lupus anticoagulant positive 12/28/2021 4/6/22 pt states negative    Right shoulder pain 2018    surg scheduled    Seborrhea-like dermatitis with psoriasiform elements     Sleep apnea 2017    uses cpap nightly    SVT (supraventricular tachycardia) 2017    Transfusion reaction     hep c-- 1980's & treated       Past Surgical History:   Procedure Laterality Date    APPENDECTOMY      CHOLECYSTECTOMY      FLEXIBLE SIGMOIDOSCOPY N/A 4/8/2022    Procedure: SIGMOIDOSCOPY, FLEXIBLE;  Surgeon: Michael Avalos MD;  Location: [facility] ENDO;  Service: Endoscopy;  Laterality: N/A;    LUMBAR DISC SURGERY  2007    pet scan      right shoulder surgery  2004 and 2019    x2     SHOULDER ARTHROSCOPY Right 8/28/2019    Procedure: ARTHROSCOPY, SHOULDER;  Surgeon: Stone Suarez MD;  Location: [facility] OR;  Service: Orthopedics;  Laterality: Right;  ARTHREX NOTIFIED    total proctocolectomy  1985       Review of  patient's allergies indicates:  No Known Allergies    No current facility-administered medications on file prior to encounter.     Current Outpatient Medications on File Prior to Encounter   Medication Sig    folic acid-vit B6-vit B12 2.5-25-2 mg (FOLBIC OR EQUIV) 2.5-25-2 mg Tab Take 1 tablet by mouth once daily.    ketoconazole (NIZORAL) 2 % shampoo Apply topically.    levocetirizine (XYZAL) 5 MG tablet TAKE 1 TABLET BY MOUTH TWICE A DAY    tamsulosin (FLOMAX) 0.4 mg Cap      Family History       Problem Relation (Age of Onset)    Hypertension Mother          Tobacco Use    Smoking status: Never Smoker    Smokeless tobacco: Never Used   Substance and Sexual Activity    Alcohol use: No    Drug use: No    Sexual activity: Not on file     Review of Systems   Constitutional:  Negative for activity change and appetite change.   HENT:  Negative for congestion and dental problem.    Eyes:  Negative for discharge and itching.   Respiratory:  Negative for shortness of breath.    Cardiovascular:  Negative for chest pain.   Gastrointestinal:  Negative for abdominal distention and abdominal pain.   Endocrine: Negative for cold intolerance.   Genitourinary:  Negative for difficulty urinating and dysuria.   Musculoskeletal:  Negative for arthralgias and back pain.   Skin:  Negative for color change.   Neurological:  Positive for dizziness. Negative for facial asymmetry.   Hematological:  Negative for adenopathy.   Psychiatric/Behavioral:  Negative for agitation and behavioral problems.    Objective:     Vital Signs (Most Recent):  Temp: 98.2 °F (36.8 °C) (06/21/22 1630)  Pulse: 66 (06/21/22 1630)  Resp: 19 (06/21/22 1630)  BP: 134/84 (06/21/22 1630)  SpO2: 97 % (06/21/22 1630) Vital Signs (24h Range):  Temp:  [98 °F (36.7 °C)-98.2 °F (36.8 °C)] 98.2 °F (36.8 °C)  Pulse:  [63-79] 66  Resp:  [10-80] 19  SpO2:  [90 %-98 %] 97 %  BP: (103-134)/(56-84) 134/84     Weight: 82 kg (180 lb 12.4 oz)  Body mass index is 23.85  kg/m².    Physical Exam  Vitals and nursing note reviewed.   Constitutional:       Appearance: He is well-developed.   HENT:      Head: Atraumatic.      Right Ear: External ear normal.      Left Ear: External ear normal.      Nose: Nose normal.      Mouth/Throat:      Mouth: Mucous membranes are moist.   Eyes:      General: No scleral icterus.  Cardiovascular:      Rate and Rhythm: Normal rate.   Pulmonary:      Effort: Pulmonary effort is normal.      Breath sounds: Normal breath sounds.   Abdominal:      General: There is no distension.   Musculoskeletal:         General: Normal range of motion.      Cervical back: Full passive range of motion without pain and normal range of motion.   Skin:     General: Skin is warm.   Neurological:      Mental Status: He is alert and oriented to person, place, and time.   Psychiatric:         Behavior: Behavior normal.           Significant Labs: All pertinent labs within the past 24 hours have been reviewed.  CBC:   Recent Labs   Lab 06/21/22  0803   WBC 11.22   HGB 14.9   HCT 44.4        CMP:   Recent Labs   Lab 06/21/22  0803      K 3.8      CO2 28   *   BUN 13   CREATININE 0.9   CALCIUM 8.7   PROT 6.7   ALBUMIN 3.8   BILITOT 0.9   ALKPHOS 75   AST 23   ALT 27   ANIONGAP 7*   EGFRNONAA >60.0       Significant Imaging: I have reviewed all pertinent imaging results/findings within the past 24 hours.    Assessment/Plan:     * Syncope and collapse  Mostly vasovagal syncope  In view with persisting URTI , middle ear inflammation needs to be r/o  Will start on low dose steroids  CT Head WNL  EKG showed sinus rhythm with first degree block which has been there since 2019  Possible Home tomorrow AM         VTE Risk Mitigation (From admission, onward)         Ordered     IP VTE HIGH RISK PATIENT  Once         06/21/22 1438     Place sequential compression device  Until discontinued         06/21/22 1438                   Kvng Brewer MD  Department of Hospital  Medicine   [facility]

## 2022-06-21 NOTE — ED PROVIDER NOTES
Encounter Date: 6/21/2022       History     Chief Complaint   Patient presents with    Fall     Got weak and passed out, hit his head while standing to urinate.     66-year-old male who had a prior history of ulcerative colitis which she had a total colectomy, presents emergency room with a history that he was voiding urine this morning when he felt lightheaded dizzy apparently passed out and fell to the floor.  According to his wife she heard the noise any could not have been unresponsive for more than approximately 3 minutes.  He denies any complaints of any head or neck pain.  No complaints of any nausea vomiting.  No recent fever.  No visual blurring.  No chest pain or palpitations.  He does admit to some nasal congestion and a scratchy throat had a negative home COVID test.  He denies any dysuria hematuria there is no flank pain.  No complaints of any focal weakness.        Review of patient's allergies indicates:  No Known Allergies  Past Medical History:   Diagnosis Date    Allergic rhinitis     Back pain     Cancer     skin    Hx of seasonal allergies 2000    Legionella pneumonia     Lupus anticoagulant positive 12/28/2021 4/6/22 pt states negative    Right shoulder pain 2018    surg scheduled    Seborrhea-like dermatitis with psoriasiform elements     Sleep apnea 2017    uses cpap nightly    SVT (supraventricular tachycardia) 2017    Transfusion reaction     hep c-- 1980's & treated     Past Surgical History:   Procedure Laterality Date    APPENDECTOMY      CHOLECYSTECTOMY      FLEXIBLE SIGMOIDOSCOPY N/A 4/8/2022    Procedure: SIGMOIDOSCOPY, FLEXIBLE;  Surgeon: Michael Avalos MD;  Location: [facility] ENDO;  Service: Endoscopy;  Laterality: N/A;    LUMBAR DISC SURGERY  2007    pet scan      right shoulder surgery  2004 and 2019    x2     SHOULDER ARTHROSCOPY Right 8/28/2019    Procedure: ARTHROSCOPY, SHOULDER;  Surgeon: Stone Suarez MD;  Location: [facility] OR;  Service: Orthopedics;   Laterality: Right;  ARTHREX NOTIFIED    total proctocolectomy  1985     Family History   Problem Relation Age of Onset    Allergic rhinitis Neg Hx     Allergies Neg Hx     Angioedema Neg Hx     Asthma Neg Hx     Eczema Neg Hx     Atopy Neg Hx     Immunodeficiency Neg Hx     Rhinitis Neg Hx     Urticaria Neg Hx      Social History     Tobacco Use    Smoking status: Never Smoker    Smokeless tobacco: Never Used   Substance Use Topics    Alcohol use: No    Drug use: No     Review of Systems   Constitutional: Negative for chills, diaphoresis and fever.   HENT: Positive for sinus pressure. Negative for congestion, ear pain, rhinorrhea, sinus pain and sore throat.    Respiratory: Negative for cough and shortness of breath.    Cardiovascular: Negative for chest pain.   Gastrointestinal: Negative for abdominal pain, constipation, diarrhea, nausea and vomiting.   Genitourinary: Negative for difficulty urinating, dysuria, flank pain, frequency and hematuria.   Skin: Negative for pallor, rash and wound.   All other systems reviewed and are negative.      Physical Exam     Initial Vitals [06/21/22 0734]   BP Pulse Resp Temp SpO2   117/76 70 (!) 80 98 °F (36.7 °C) 98 %      MAP       --         Physical Exam    Vitals reviewed.  Constitutional: He appears well-developed and well-nourished. He is not diaphoretic. No distress.   Alert, appropriate, conversant   HENT:   Head: Normocephalic and atraumatic.   Right Ear: External ear normal.   Left Ear: External ear normal.   Nose: Nose normal.   Mouth/Throat: Oropharynx is clear and moist.   Eyes: Conjunctivae and EOM are normal. Pupils are equal, round, and reactive to light.   Neck: Neck supple. No JVD present.   Normal range of motion.  Cardiovascular: Normal rate, regular rhythm, normal heart sounds and intact distal pulses. Exam reveals no gallop and no friction rub.    No murmur heard.  Pulmonary/Chest: Breath sounds normal. No respiratory distress. He has no  wheezes. He has no rhonchi. He has no rales.   Abdominal: Abdomen is soft. Bowel sounds are normal. He exhibits no distension. There is no abdominal tenderness. There is no rebound and no guarding.   Musculoskeletal:         General: No tenderness or edema. Normal range of motion.      Cervical back: Normal range of motion and neck supple.     Lymphadenopathy:     He has no cervical adenopathy.   Neurological: He is alert and oriented to person, place, and time. He has normal strength. GCS score is 15. GCS eye subscore is 4. GCS verbal subscore is 5. GCS motor subscore is 6.   Skin: Skin is warm and dry. Capillary refill takes less than 2 seconds.   Psychiatric: He has a normal mood and affect. His behavior is normal. Judgment and thought content normal.         ED Course   Procedures  Labs Reviewed   CBC W/ AUTO DIFFERENTIAL - Abnormal; Notable for the following components:       Result Value    RBC 4.56 (*)     MCH 32.7 (*)     Gran # (ANC) 9.1 (*)     Immature Grans (Abs) 0.06 (*)     Gran % 81.3 (*)     Lymph % 9.8 (*)     All other components within normal limits   COMPREHENSIVE METABOLIC PANEL - Abnormal; Notable for the following components:    Glucose 131 (*)     Anion Gap 7 (*)     All other components within normal limits   POCT GLUCOSE - Abnormal; Notable for the following components:    POC Glucose 139 (*)     All other components within normal limits   SARS-COV-2 RNA AMPLIFICATION, QUAL   POCT GLUCOSE MONITORING CONTINUOUS        ECG Results          EKG and show to ED MD (In process)  Result time 06/21/22 07:45:45    In process by Interface, Lab In [facility] (06/21/22 07:45:45)                 Narrative:    Test Reason : R55,    Vent. Rate : 069 BPM     Atrial Rate : 069 BPM     P-R Int : 428 ms          QRS Dur : 086 ms      QT Int : 388 ms       P-R-T Axes : 066 054 054 degrees     QTc Int : 415 ms    Sinus rhythm with 1st degree A-V block  Cannot rule out Anterior infarct ,age undetermined  Abnormal  ECG  When compared with ECG of 21-AUG-2019 08:17,  No significant change was found    Referred By:  LEANA VILLANUEVA           Confirmed By:                             Imaging Results          CT Head Without Contrast (Final result)  Result time 06/21/22 09:00:37    Final result by Kai Cook MD (06/21/22 09:00:37)                 Narrative:    CMS MANDATED QUALITY DATA - CT RADIATION  436    All CT scans at this facility utilize dose modulation, iterative reconstruction, and/or weight based dosing when appropriate to reduce radiation dose to as low as reasonably achievable.    CT HEAD WITHOUT IV CONTRAST    CLINICAL HISTORY:  66 years Male Syncope    COMPARISON: None    FINDINGS: Negative for acute intracranial hemorrhage, midline shift, or mass effect. Ventricles and sulci are normal in size. Gray-white differentiation is maintained. Cerebellar hemispheres and brainstem are unremarkable. Atherosclerotic calcification intracranial carotid arteries.    No calvarial lesion or fracture. Mastoid air cells are clear. Bilateral maxillary sinus mucosal thickening/fluid. Ethmoid air cell mucosal thickening.    IMPRESSION:    No CT evidence of acute intracranial pathology.    Electronically signed by:  Kai Cook MD  6/21/2022 9:00 AM CDT Workstation: [id number]                               Medications - No data to display             Attending Attestation:             Attending ED Notes:   This 66-year-old male had presented with a history of having a syncopal episode while standing in voiding urine this morning, at this time is not orthostatic.  The patient's EKG showed a sinus rhythm with a first-degree AV block.  Screening labs obtained were reviewed.  Patient's CT scan of his head which is negative.  During the ED course I did discuss this case with Dr. Chen and he did request the patient be admitted.  Hospital Medicine is consulted for a med tele admission.               Clinical Impression:   Final  diagnoses:  [R55] Syncope                 Dano Adame Jr., MD  06/22/22 1445

## 2022-06-21 NOTE — PLAN OF CARE
06/21/22 1636   DONOVAN Message   Medicare Outpatient and Observation Notification regarding financial responsibility Given to patient/caregiver;Explained to patient/caregiver;Signed/date by patient/caregiver   Date DONOVAN was signed 06/21/22   Time DONOVAN was signed [id number]

## 2022-06-21 NOTE — PLAN OF CARE
[facility]  Initial Discharge Assessment       Primary Care Provider: Milan Chen III, MD    Admission Diagnosis: Syncope and collapse [R55]    Admission Date: 6/21/2022  Expected Discharge Date:      Initial assessment completed at bedside with patient and spouse, Thalia. Patient indepednently manages care and prefers to self schedule hospital follow up appointment at discharge. Patient anticipates discharge home when medically clear.         Payor: [employer] / Plan: [employer]IC[employer] ADVANTAGE / Product Type: Medicare Advantage /     Extended Emergency Contact Information  Primary Emergency Contact: Thalia Quiros  Address: [address] [place] [address] [facility]  Home Phone: [phone]  Mobile Phone: [phone]  Relation: Spouse    Discharge Plan A: Home  Discharge Plan B: Home      [id number] [place] - [address] [facility]  [address] [facility] [address]  Phone: [phone] Fax: [phone]      Initial Assessment (most recent)     Adult Discharge Assessment - 06/21/22 1640        Discharge Assessment    Assessment Type Discharge Planning Assessment     Confirmed/corrected address, phone number and insurance Yes     Confirmed Demographics Correct on Facesheet     Source of Information patient     When was your last doctors appointment? --   June 2022    Reason For Admission Syncope and collapse     Lives With spouse     Facility Arrived From: Home     Do you expect to return to your current living situation? Yes     Do you have help at home or someone to help you manage your care at home? Yes     Who are your caregiver(s) and their phone number(s)? Thalia (spouse) [phone]     Prior to hospitilization cognitive status: Alert/Oriented     Current cognitive status: Alert/Oriented     Walking or Climbing Stairs Difficulty none     Dressing/Bathing Difficulty none     Home Layout Able to live on 1st floor      Equipment Currently Used at Home CPAP     Readmission within 30 days? No     Patient currently being followed by outpatient case management? No     Do you currently have service(s) that help you manage your care at home? No     Do you take prescription medications? Yes     Do you have prescription coverage? Yes     Coverage [facility]     Do you have any problems affording any of your prescribed medications? No     Is the patient taking medications as prescribed? yes     Who is going to help you get home at discharge? Thalia (spouse) [phone]     How do you get to doctors appointments? car, drives self     Are you on dialysis? No     Do you take coumadin? No     Discharge Plan A Home     Discharge Plan B Home     DME Needed Upon Discharge  none     Discharge Plan discussed with: Patient;Spouse/sig other     Name(s) and Number(s) Thalia (spouse) [phone]

## 2022-06-21 NOTE — SUBJECTIVE & OBJECTIVE
Past Medical History:   Diagnosis Date    Allergic rhinitis     Back pain     Cancer     skin    Hx of seasonal allergies 2000    Legionella pneumonia     Lupus anticoagulant positive 12/28/2021 4/6/22 pt states negative    Right shoulder pain 2018    surg scheduled    Seborrhea-like dermatitis with psoriasiform elements     Sleep apnea 2017    uses cpap nightly    SVT (supraventricular tachycardia) 2017    Transfusion reaction     hep c-- 1980's & treated       Past Surgical History:   Procedure Laterality Date    APPENDECTOMY      CHOLECYSTECTOMY      FLEXIBLE SIGMOIDOSCOPY N/A 4/8/2022    Procedure: SIGMOIDOSCOPY, FLEXIBLE;  Surgeon: Michael Avalos MD;  Location: [facility] ENDO;  Service: Endoscopy;  Laterality: N/A;    LUMBAR DISC SURGERY  2007    pet scan      right shoulder surgery  2004 and 2019    x2     SHOULDER ARTHROSCOPY Right 8/28/2019    Procedure: ARTHROSCOPY, SHOULDER;  Surgeon: Stone Suarez MD;  Location: [facility] OR;  Service: Orthopedics;  Laterality: Right;  ARTHREX NOTIFIED    total proctocolectomy  1985       Review of patient's allergies indicates:  No Known Allergies    No current facility-administered medications on file prior to encounter.     Current Outpatient Medications on File Prior to Encounter   Medication Sig    folic acid-vit B6-vit B12 2.5-25-2 mg (FOLBIC OR EQUIV) 2.5-25-2 mg Tab Take 1 tablet by mouth once daily.    ketoconazole (NIZORAL) 2 % shampoo Apply topically.    levocetirizine (XYZAL) 5 MG tablet TAKE 1 TABLET BY MOUTH TWICE A DAY    tamsulosin (FLOMAX) 0.4 mg Cap      Family History       Problem Relation (Age of Onset)    Hypertension Mother          Tobacco Use    Smoking status: Never Smoker    Smokeless tobacco: Never Used   Substance and Sexual Activity    Alcohol use: No    Drug use: No    Sexual activity: Not on file     Review of Systems   Constitutional:  Negative for activity change and appetite change.   HENT:  Negative for congestion and dental problem.     Eyes:  Negative for discharge and itching.   Respiratory:  Negative for shortness of breath.    Cardiovascular:  Negative for chest pain.   Gastrointestinal:  Negative for abdominal distention and abdominal pain.   Endocrine: Negative for cold intolerance.   Genitourinary:  Negative for difficulty urinating and dysuria.   Musculoskeletal:  Negative for arthralgias and back pain.   Skin:  Negative for color change.   Neurological:  Positive for dizziness. Negative for facial asymmetry.   Hematological:  Negative for adenopathy.   Psychiatric/Behavioral:  Negative for agitation and behavioral problems.    Objective:     Vital Signs (Most Recent):  Temp: 98.2 °F (36.8 °C) (06/21/22 1630)  Pulse: 66 (06/21/22 1630)  Resp: 19 (06/21/22 1630)  BP: 134/84 (06/21/22 1630)  SpO2: 97 % (06/21/22 1630) Vital Signs (24h Range):  Temp:  [98 °F (36.7 °C)-98.2 °F (36.8 °C)] 98.2 °F (36.8 °C)  Pulse:  [63-79] 66  Resp:  [10-80] 19  SpO2:  [90 %-98 %] 97 %  BP: (103-134)/(56-84) 134/84     Weight: 82 kg (180 lb 12.4 oz)  Body mass index is 23.85 kg/m².    Physical Exam  Vitals and nursing note reviewed.   Constitutional:       Appearance: He is well-developed.   HENT:      Head: Atraumatic.      Right Ear: External ear normal.      Left Ear: External ear normal.      Nose: Nose normal.      Mouth/Throat:      Mouth: Mucous membranes are moist.   Eyes:      General: No scleral icterus.  Cardiovascular:      Rate and Rhythm: Normal rate.   Pulmonary:      Effort: Pulmonary effort is normal.      Breath sounds: Normal breath sounds.   Abdominal:      General: There is no distension.   Musculoskeletal:         General: Normal range of motion.      Cervical back: Full passive range of motion without pain and normal range of motion.   Skin:     General: Skin is warm.   Neurological:      Mental Status: He is alert and oriented to person, place, and time.   Psychiatric:         Behavior: Behavior normal.           Significant Labs: All  pertinent labs within the past 24 hours have been reviewed.  CBC:   Recent Labs   Lab 06/21/22  0803   WBC 11.22   HGB 14.9   HCT 44.4        CMP:   Recent Labs   Lab 06/21/22  0803      K 3.8      CO2 28   *   BUN 13   CREATININE 0.9   CALCIUM 8.7   PROT 6.7   ALBUMIN 3.8   BILITOT 0.9   ALKPHOS 75   AST 23   ALT 27   ANIONGAP 7*   EGFRNONAA >60.0       Significant Imaging: I have reviewed all pertinent imaging results/findings within the past 24 hours.

## 2022-06-22 VITALS
WEIGHT: 180.75 LBS | TEMPERATURE: 98 F | HEIGHT: 73 IN | DIASTOLIC BLOOD PRESSURE: 84 MMHG | OXYGEN SATURATION: 96 % | SYSTOLIC BLOOD PRESSURE: 145 MMHG | HEART RATE: 55 BPM | RESPIRATION RATE: 18 BRPM | BODY MASS INDEX: 23.96 KG/M2

## 2022-06-22 PROBLEM — R55 SYNCOPE AND COLLAPSE: Status: RESOLVED | Noted: 2022-06-21 | Resolved: 2022-06-22

## 2022-06-22 LAB
ALBUMIN SERPL BCP-MCNC: 3.4 G/DL (ref 3.5–5.2)
ALP SERPL-CCNC: 61 U/L (ref 55–135)
ALT SERPL W/O P-5'-P-CCNC: 22 U/L (ref 10–44)
ANION GAP SERPL CALC-SCNC: 8 MMOL/L (ref 8–16)
AST SERPL-CCNC: 24 U/L (ref 10–40)
BILIRUB SERPL-MCNC: 1.2 MG/DL (ref 0.1–1)
BUN SERPL-MCNC: 12 MG/DL (ref 8–23)
CALCIUM SERPL-MCNC: 8.6 MG/DL (ref 8.7–10.5)
CHLORIDE SERPL-SCNC: 108 MMOL/L (ref 95–110)
CO2 SERPL-SCNC: 25 MMOL/L (ref 23–29)
CREAT SERPL-MCNC: 0.7 MG/DL (ref 0.5–1.4)
ERYTHROCYTE [DISTWIDTH] IN BLOOD BY AUTOMATED COUNT: 12.4 % (ref 11.5–14.5)
EST. GFR  (AFRICAN AMERICAN): >60 ML/MIN/1.73 M^2
EST. GFR  (NON AFRICAN AMERICAN): >60 ML/MIN/1.73 M^2
GLUCOSE SERPL-MCNC: 96 MG/DL (ref 70–110)
HCT VFR BLD AUTO: 42.2 % (ref 40–54)
HGB BLD-MCNC: 13.8 G/DL (ref 14–18)
MCH RBC QN AUTO: 33.4 PG (ref 27–31)
MCHC RBC AUTO-ENTMCNC: 32.7 G/DL (ref 32–36)
MCV RBC AUTO: 102 FL (ref 82–98)
PLATELET # BLD AUTO: 139 K/UL (ref 150–450)
PMV BLD AUTO: 10.6 FL (ref 9.2–12.9)
POTASSIUM SERPL-SCNC: 4.3 MMOL/L (ref 3.5–5.1)
PROT SERPL-MCNC: 6.4 G/DL (ref 6–8.4)
RBC # BLD AUTO: 4.13 M/UL (ref 4.6–6.2)
SODIUM SERPL-SCNC: 141 MMOL/L (ref 136–145)
WBC # BLD AUTO: 7.58 K/UL (ref 3.9–12.7)

## 2022-06-22 PROCEDURE — 63600175 PHARM REV CODE 636 W HCPCS: Performed by: INTERNAL MEDICINE

## 2022-06-22 PROCEDURE — G0378 HOSPITAL OBSERVATION PER HR: HCPCS

## 2022-06-22 PROCEDURE — 85027 COMPLETE CBC AUTOMATED: CPT | Performed by: INTERNAL MEDICINE

## 2022-06-22 PROCEDURE — 80053 COMPREHEN METABOLIC PANEL: CPT | Performed by: INTERNAL MEDICINE

## 2022-06-22 PROCEDURE — 36415 COLL VENOUS BLD VENIPUNCTURE: CPT | Performed by: INTERNAL MEDICINE

## 2022-06-22 PROCEDURE — 25000003 PHARM REV CODE 250: Performed by: INTERNAL MEDICINE

## 2022-06-22 RX ORDER — PREDNISONE 20 MG/1
TABLET ORAL
Qty: 9 TABLET | Refills: 0 | Status: SHIPPED | OUTPATIENT
Start: 2022-06-23 | End: 2022-06-29

## 2022-06-22 RX ADMIN — PREDNISONE 40 MG: 20 TABLET ORAL at 09:06

## 2022-06-22 RX ADMIN — SODIUM CHLORIDE: 0.9 INJECTION, SOLUTION INTRAVENOUS at 09:06

## 2022-06-23 NOTE — DISCHARGE SUMMARY
[facility] Medicine  Discharge Summary      Patient Name: Faisal Quiros  MRN: [id number]  Patient Class: OP- Observation  Admission Date: 6/21/2022  Hospital Length of Stay: 0 days  Discharge Date and Time:  06/22/2022 7:17 PM  Attending Physician: No att. providers found   Discharging Provider: Kvng Brewer MD  Primary Care Provider: Milan Chen III, MD      HPI:   66 year old patient getting admitted with syncope and collapse  Patient has been suffering from severe URTI like infection for past several days  Today while he was in bathroom , passing urine, pt felt dizzy and had a fall  Patient recollect all events and denies hitting head on ground  Denies chest pain or other issues       * No surgery found *      Hospital Course:   Patient admitted with vasovagal syncope and possible middle ear inflammation associated with URTI  Pt was treated conservatively, got better and was later discharged to home        Goals of Care Treatment Preferences:  Code Status: Full Code      Consults:     No new Assessment & Plan notes have been filed under this hospital service since the last note was generated.  Service: Hospital Medicine    Final Active Diagnoses:      Problems Resolved During this Admission:    Diagnosis Date Noted Date Resolved POA    PRINCIPAL PROBLEM:  Syncope and collapse [R55] 06/21/2022 06/22/2022 Unknown       Discharged Condition: good    Disposition: Home or Self Care    Follow Up:   Follow-up Information     Milan Chen III, MD Follow up in 1 week(s).    Specialty: Family Medicine  Contact information:  [address] [place] [address]  [address]  [facility] [address]  [phone]                       Patient Instructions:   No discharge procedures on file.    Significant Diagnostic Studies: Labs:   CMP   Recent Labs   Lab 06/21/22  0803 06/22/22  0625    141   K 3.8 4.3    108   CO2 28 25   * 96   BUN 13 12   CREATININE 0.9 0.7   CALCIUM 8.7 8.6*   PROT 6.7 6.4   ALBUMIN  3.8 3.4*   BILITOT 0.9 1.2*   ALKPHOS 75 61   AST 23 24   ALT 27 22   ANIONGAP 7* 8   ESTGFRAFRICA >60.0 >60.0   EGFRNONAA >60.0 >60.0    and CBC   Recent Labs   Lab 06/21/22  0803 06/22/22  0625   WBC 11.22 7.58   HGB 14.9 13.8*   HCT 44.4 42.2    139*       Pending Diagnostic Studies:     None         Medications:  Reconciled Home Medications:      Medication List      START taking these medications    predniSONE 20 MG tablet  Commonly known as: DELTASONE  Take 2 tablets (40 mg total) by mouth once daily for 3 days, THEN 1 tablet (20 mg total) once daily for 3 days.  Start taking on: June 23, 2022        CONTINUE taking these medications    levocetirizine 5 MG tablet  Commonly known as: XYZAL  TAKE 1 TABLET BY MOUTH TWICE A DAY        STOP taking these medications    folic acid-vit B6-vit B12 2.5-25-2 mg 2.5-25-2 mg Tab  Commonly known as: FOLBIC or Equiv     ketoconazole 2 % shampoo  Commonly known as: NIZORAL     tamsulosin 0.4 mg Cap  Commonly known as: FLOMAX            Indwelling Lines/Drains at time of discharge:   Lines/Drains/Airways     None               Physical Exam   Constitutional: He is oriented to person, place, and time.   Cardiovascular: Normal rate.   Neurological: He is alert and oriented to person, place, and time.     Time spent on the discharge of patient: 23  minutes         Kvng Brewer MD  Department of Hospital Medicine  [facility]

## 2022-06-23 NOTE — HOSPITAL COURSE
Patient admitted with vasovagal syncope and possible middle ear inflammation associated with URTI  Pt was treated conservatively, got better and was later discharged to home

## 2022-06-23 NOTE — PLAN OF CARE
Chart and discharge orders reviewed.  Patient discharged home with no further case management needs.     06/23/22 1208   Final Note   Assessment Type Final Discharge Note   Anticipated Discharge Disposition Home   Post-Acute Status   Discharge Delays None known at this time

## 2022-07-01 ENCOUNTER — HOSPITAL ENCOUNTER (OUTPATIENT)
Dept: RADIOLOGY | Facility: HOSPITAL | Age: 67
Discharge: HOME | End: 2022-07-01
Attending: SPECIALIST
Payer: MEDICARE

## 2022-07-01 ENCOUNTER — OFFICE VISIT (OUTPATIENT)
Dept: FAMILY MEDICINE | Facility: CLINIC | Age: 67
End: 2022-07-01
Payer: MEDICARE

## 2022-07-01 ENCOUNTER — HOSPITAL ENCOUNTER (OUTPATIENT)
Dept: PREADMISSION TESTING | Facility: HOSPITAL | Age: 67
Discharge: HOME | End: 2022-07-01
Attending: INTERNAL MEDICINE
Payer: MEDICARE

## 2022-07-01 VITALS
OXYGEN SATURATION: 99 % | DIASTOLIC BLOOD PRESSURE: 78 MMHG | SYSTOLIC BLOOD PRESSURE: 126 MMHG | BODY MASS INDEX: 23.72 KG/M2 | HEART RATE: 78 BPM | HEIGHT: 73 IN | WEIGHT: 179 LBS | TEMPERATURE: 98 F

## 2022-07-01 VITALS
HEIGHT: 73 IN | SYSTOLIC BLOOD PRESSURE: 141 MMHG | HEART RATE: 65 BPM | WEIGHT: 177 LBS | BODY MASS INDEX: 23.46 KG/M2 | TEMPERATURE: 99 F | DIASTOLIC BLOOD PRESSURE: 87 MMHG | OXYGEN SATURATION: 95 % | RESPIRATION RATE: 16 BRPM

## 2022-07-01 DIAGNOSIS — Z01.818 PREOP TESTING: Primary | ICD-10-CM

## 2022-07-01 DIAGNOSIS — Z01.818 PREOP EXAMINATION: ICD-10-CM

## 2022-07-01 DIAGNOSIS — D49.4 BLADDER TUMOR: Primary | ICD-10-CM

## 2022-07-01 DIAGNOSIS — R55 SYNCOPE, UNSPECIFIED SYNCOPE TYPE: ICD-10-CM

## 2022-07-01 DIAGNOSIS — J06.9 UPPER RESPIRATORY TRACT INFECTION, UNSPECIFIED TYPE: ICD-10-CM

## 2022-07-01 DIAGNOSIS — Z90.49 HISTORY OF COLECTOMY: ICD-10-CM

## 2022-07-01 DIAGNOSIS — Z01.818 PREOP EXAMINATION: Primary | ICD-10-CM

## 2022-07-01 PROCEDURE — 3078F DIAST BP <80 MM HG: CPT | Mod: CPTII,S$GLB,, | Performed by: FAMILY MEDICINE

## 2022-07-01 PROCEDURE — 1101F PT FALLS ASSESS-DOCD LE1/YR: CPT | Mod: CPTII,S$GLB,, | Performed by: FAMILY MEDICINE

## 2022-07-01 PROCEDURE — 3008F BODY MASS INDEX DOCD: CPT | Mod: CPTII,S$GLB,, | Performed by: FAMILY MEDICINE

## 2022-07-01 PROCEDURE — 71046 X-RAY EXAM CHEST 2 VIEWS: CPT | Mod: TC

## 2022-07-01 PROCEDURE — 1159F PR MEDICATION LIST DOCUMENTED IN MEDICAL RECORD: ICD-10-PCS | Mod: CPTII,S$GLB,, | Performed by: FAMILY MEDICINE

## 2022-07-01 PROCEDURE — 1101F PR PT FALLS ASSESS DOC 0-1 FALLS W/OUT INJ PAST YR: ICD-10-PCS | Mod: CPTII,S$GLB,, | Performed by: FAMILY MEDICINE

## 2022-07-01 PROCEDURE — 3288F FALL RISK ASSESSMENT DOCD: CPT | Mod: CPTII,S$GLB,, | Performed by: FAMILY MEDICINE

## 2022-07-01 PROCEDURE — 1126F PR PAIN SEVERITY QUANTIFIED, NO PAIN PRESENT: ICD-10-PCS | Mod: CPTII,S$GLB,, | Performed by: FAMILY MEDICINE

## 2022-07-01 PROCEDURE — 3288F PR FALLS RISK ASSESSMENT DOCUMENTED: ICD-10-PCS | Mod: CPTII,S$GLB,, | Performed by: FAMILY MEDICINE

## 2022-07-01 PROCEDURE — 1159F MED LIST DOCD IN RCRD: CPT | Mod: CPTII,S$GLB,, | Performed by: FAMILY MEDICINE

## 2022-07-01 PROCEDURE — 3078F PR MOST RECENT DIASTOLIC BLOOD PRESSURE < 80 MM HG: ICD-10-PCS | Mod: CPTII,S$GLB,, | Performed by: FAMILY MEDICINE

## 2022-07-01 PROCEDURE — 99215 PR OFFICE/OUTPT VISIT, EST, LEVL V, 40-54 MIN: ICD-10-PCS | Mod: S$GLB,,, | Performed by: FAMILY MEDICINE

## 2022-07-01 PROCEDURE — 1126F AMNT PAIN NOTED NONE PRSNT: CPT | Mod: CPTII,S$GLB,, | Performed by: FAMILY MEDICINE

## 2022-07-01 PROCEDURE — 1160F PR REVIEW ALL MEDS BY PRESCRIBER/CLIN PHARMACIST DOCUMENTED: ICD-10-PCS | Mod: CPTII,S$GLB,, | Performed by: FAMILY MEDICINE

## 2022-07-01 PROCEDURE — 3074F PR MOST RECENT SYSTOLIC BLOOD PRESSURE < 130 MM HG: ICD-10-PCS | Mod: CPTII,S$GLB,, | Performed by: FAMILY MEDICINE

## 2022-07-01 PROCEDURE — 3008F PR BODY MASS INDEX (BMI) DOCUMENTED: ICD-10-PCS | Mod: CPTII,S$GLB,, | Performed by: FAMILY MEDICINE

## 2022-07-01 PROCEDURE — 3074F SYST BP LT 130 MM HG: CPT | Mod: CPTII,S$GLB,, | Performed by: FAMILY MEDICINE

## 2022-07-01 PROCEDURE — 1160F RVW MEDS BY RX/DR IN RCRD: CPT | Mod: CPTII,S$GLB,, | Performed by: FAMILY MEDICINE

## 2022-07-01 PROCEDURE — 99215 OFFICE O/P EST HI 40 MIN: CPT | Mod: S$GLB,,, | Performed by: FAMILY MEDICINE

## 2022-07-01 RX ORDER — ACETAMINOPHEN 500 MG
500 TABLET ORAL EVERY 6 HOURS PRN
COMMUNITY
End: 2023-01-04

## 2022-07-01 RX ORDER — CEFAZOLIN SODIUM 2 G/50ML
2 SOLUTION INTRAVENOUS ONCE
Status: CANCELLED | OUTPATIENT
Start: 2022-07-07

## 2022-07-01 NOTE — PROGRESS NOTES
Subjective:       Patient ID: Faisal Quiros is a 66 y.o. male.    Chief Complaint: Follow-up (Hosp fu)  Coordination of care visit.  [facility]- 6/21/22 [facility]  Patient presents to clinic for hospital follow-up. Syncope.  Was 2 congested to use his CPAP for 3 days.  3 days prior to ED visit he was congested. States he was not able to sleep for 2 days due to feeling ill.  After taking OTC Sudafed he went to the restroom and started to feel dizzy. He passed out and hit his head on bathroom floor. No injury. Wife found him on the floor. TURBT scheduled with [facility] next week. Found mass in bladder after cystoscopy in [facility] office. Discontinued Flomax due to side effects. MTHFR mutation. Current follow up with [facility]. MCV increased. Tetanus up to date.  CT scan at the hospital was negative.  Head did damage the sheet rock.  To undergo transurethral resection of a bladder tumor next week.    Review of Systems   HENT: Positive for sinus pressure/congestion.    Genitourinary:        Mass in bladder. TURBT scheduled   Neurological: Positive for dizziness, syncope and light-headedness.         Objective:      Physical Exam  Constitutional:       Appearance: Normal appearance.   Neck:      Vascular: No carotid bruit.   Cardiovascular:      Rate and Rhythm: Normal rate and regular rhythm.      Pulses: Normal pulses.      Heart sounds: Normal heart sounds.   Pulmonary:      Effort: Pulmonary effort is normal.      Breath sounds: Normal breath sounds.   Lymphadenopathy:      Cervical: No cervical adenopathy.   Skin:     General: Skin is warm and dry.   Neurological:      Mental Status: He is alert.   Psychiatric:         Mood and Affect: Mood normal.         Behavior: Behavior normal.         Assessment/Plan:     Bladder tumor    Syncope, unspecified syncope type    Upper respiratory tract infection, unspecified type    History of colectomy       Follow-up 3-6 months.  TD AP  Update.  Received it on March  31, 2017. Stay well hydrated.

## 2022-07-01 NOTE — DISCHARGE INSTRUCTIONS
To confirm, Your doctor has instructed you that surgery is scheduled for: Thursday July 7, 2022    Pre-Op will call the afternoon prior to surgery between 4:00 and 6:00 PM with the final arrival time.  Wednesday 7/6/22    Please report to Registration at front of the hospital --it is best to park in the garage on [facility]    Do not eat or drink anything after midnight the night before your surgery - THIS INCLUDES  WATER, GUM, MINTS AND CANDY.    YOU MAY BRUSH YOUR TEETH     TAKE APPROVED  MEDICATIONS WITH A SMALL SIP OF WATER THE MORNING OF YOUR PROCEDURE: See Medication list    PLEASE NOTE:  The surgery schedule has many variables which may affect the time of your surgery case.  Family members should be available if your surgery time changes.  Plan to be here the day of your procedure between 4-6 hours.    DO NOT TAKE THESE MEDICATIONS 5-7 DAYS PRIOR to your procedure or per your surgeon's request: ASPIRIN, ALEVE, ADVIL, IBUPROFEN,  DYLAN SELTZER, BC , FISH OIL , VITAMIN E, HERBALS  (May take Tylenol)    ONLY if you are prescribed any types of blood thinners such as:  Aspirin, Coumadin, Plavix, Pradaxa, Xarelto, Aggrenox, Effient, Eliquis, Savasya, Brilinta, or any other, ask your surgeon whether you should stop taking them and how long before surgery you should stop.  You may also need to verify with the prescribing physician if it is ok to stop your medication.                                                     IMPORTANT INSTRUCTIONS    Do not smoke, vape or drink alcoholic beverages 24 hours prior to your procedure.  Shower the night before AND the morning of your procedure with a Chlorhexidine wash such as Hibiclens or Dial antibacterial soap from the neck down.    Do not get it on your face or in your eyes.  You may use your own shampoo and face wash. This helps your skin to be as bacteria free as possible.   Do not apply any deodorant, lotion or powder after you shower.   DO NOT remove hair from the surgery  site.  Do not shave the incision site unless you are given specific instructions to do so.    Sleep in a bed with clean sheets.  Do not sleep with a pet in the bed.   If you wear contact lenses, dentures, hearing aids or glasses, bring a container to put them in during surgery and give to a family member for safe keeping.    Please leave all jewelry, piercing's and valuables at home.   Wear comfortable clothing that is easy to remove and place back on after surgery.     Make arrangements in advance for transportation home by a responsible adult.    You must make arrangements for transportation, TAXI'S, UBER'S OR LYFTS ARE NOT ALLOWED.      If you have any questions about these instructions, call Pre-Op Admit  Nursing at [phone] , Pre-Op Day Surgery Unit at [phone]

## 2022-07-07 ENCOUNTER — HOSPITAL ENCOUNTER (OUTPATIENT)
Facility: HOSPITAL | Age: 67
Discharge: HOME | End: 2022-07-07
Attending: SPECIALIST | Admitting: SPECIALIST
Payer: MEDICARE

## 2022-07-07 ENCOUNTER — ANESTHESIA EVENT (OUTPATIENT)
Dept: SURGERY | Facility: HOSPITAL | Age: 67
End: 2022-07-07
Payer: MEDICARE

## 2022-07-07 ENCOUNTER — HOSPITAL ENCOUNTER (OUTPATIENT)
Dept: RADIOLOGY | Facility: HOSPITAL | Age: 67
Discharge: HOME | End: 2022-07-07
Attending: SPECIALIST | Admitting: SPECIALIST
Payer: MEDICARE

## 2022-07-07 ENCOUNTER — ANESTHESIA (OUTPATIENT)
Dept: SURGERY | Facility: HOSPITAL | Age: 67
End: 2022-07-07
Payer: MEDICARE

## 2022-07-07 VITALS
HEIGHT: 73 IN | BODY MASS INDEX: 23.11 KG/M2 | SYSTOLIC BLOOD PRESSURE: 178 MMHG | DIASTOLIC BLOOD PRESSURE: 86 MMHG | HEART RATE: 58 BPM | OXYGEN SATURATION: 98 % | WEIGHT: 174.38 LBS | TEMPERATURE: 98 F | RESPIRATION RATE: 18 BRPM

## 2022-07-07 DIAGNOSIS — Z01.818 PREOP TESTING: ICD-10-CM

## 2022-07-07 DIAGNOSIS — D49.4 BLADDER TUMOR: ICD-10-CM

## 2022-07-07 DIAGNOSIS — D49.4 BLADDER TUMOR: Primary | ICD-10-CM

## 2022-07-07 PROCEDURE — 51700 IRRIGATION OF BLADDER: CPT

## 2022-07-07 PROCEDURE — 25000003 PHARM REV CODE 250: Performed by: SPECIALIST

## 2022-07-07 PROCEDURE — 36000707: Performed by: SPECIALIST

## 2022-07-07 PROCEDURE — 71000033 HC RECOVERY, INTIAL HOUR: Performed by: SPECIALIST

## 2022-07-07 PROCEDURE — 27000671 HC TUBING MICROBORE EXT: Performed by: ANESTHESIOLOGY

## 2022-07-07 PROCEDURE — 25000003 PHARM REV CODE 250: Performed by: NURSE ANESTHETIST, CERTIFIED REGISTERED

## 2022-07-07 PROCEDURE — 27202103: Performed by: ANESTHESIOLOGY

## 2022-07-07 PROCEDURE — 37000008 HC ANESTHESIA 1ST 15 MINUTES: Performed by: SPECIALIST

## 2022-07-07 PROCEDURE — 37000009 HC ANESTHESIA EA ADD 15 MINS: Performed by: SPECIALIST

## 2022-07-07 PROCEDURE — 27000673 HC TUBING BLOOD Y: Performed by: ANESTHESIOLOGY

## 2022-07-07 PROCEDURE — 63600175 PHARM REV CODE 636 W HCPCS: Performed by: NURSE ANESTHETIST, CERTIFIED REGISTERED

## 2022-07-07 PROCEDURE — 88307 TISSUE EXAM BY PATHOLOGIST: CPT | Mod: TC

## 2022-07-07 PROCEDURE — 36000706: Performed by: SPECIALIST

## 2022-07-07 PROCEDURE — 71000016 HC POSTOP RECOV ADDL HR: Performed by: SPECIALIST

## 2022-07-07 PROCEDURE — 71000015 HC POSTOP RECOV 1ST HR: Performed by: SPECIALIST

## 2022-07-07 PROCEDURE — 27000654 HC CATH IV JELCO: Performed by: ANESTHESIOLOGY

## 2022-07-07 PROCEDURE — 27201107 HC STYLET, STANDARD: Performed by: ANESTHESIOLOGY

## 2022-07-07 PROCEDURE — 27000284 HC CANNULA NASAL: Performed by: ANESTHESIOLOGY

## 2022-07-07 PROCEDURE — 27202107 HC XP QUATRO SENSOR: Performed by: ANESTHESIOLOGY

## 2022-07-07 PROCEDURE — 63600175 PHARM REV CODE 636 W HCPCS: Performed by: SPECIALIST

## 2022-07-07 PROCEDURE — 27201423 OPTIME MED/SURG SUP & DEVICES STERILE SUPPLY: Performed by: SPECIALIST

## 2022-07-07 PROCEDURE — 27000080 OPTIME MED/SURG SUP & DEVICES GENERAL CLASSIFICATION: Performed by: SPECIALIST

## 2022-07-07 RX ORDER — SULFAMETHOXAZOLE AND TRIMETHOPRIM 800; 160 MG/1; MG/1
1 TABLET ORAL 2 TIMES DAILY
Qty: 7 TABLET | Refills: 0
Start: 2022-07-07 | End: 2023-01-04

## 2022-07-07 RX ORDER — DEXAMETHASONE SODIUM PHOSPHATE 4 MG/ML
INJECTION, SOLUTION INTRA-ARTICULAR; INTRALESIONAL; INTRAMUSCULAR; INTRAVENOUS; SOFT TISSUE
Status: DISCONTINUED | OUTPATIENT
Start: 2022-07-07 | End: 2022-07-07

## 2022-07-07 RX ORDER — ACETAMINOPHEN 10 MG/ML
INJECTION, SOLUTION INTRAVENOUS
Status: DISCONTINUED | OUTPATIENT
Start: 2022-07-07 | End: 2022-07-07

## 2022-07-07 RX ORDER — ROCURONIUM BROMIDE 10 MG/ML
INJECTION, SOLUTION INTRAVENOUS
Status: DISCONTINUED | OUTPATIENT
Start: 2022-07-07 | End: 2022-07-07

## 2022-07-07 RX ORDER — LIDOCAINE HYDROCHLORIDE 20 MG/ML
JELLY TOPICAL
Status: DISCONTINUED | OUTPATIENT
Start: 2022-07-07 | End: 2022-07-07 | Stop reason: HOSPADM

## 2022-07-07 RX ORDER — MIDAZOLAM HYDROCHLORIDE 1 MG/ML
INJECTION INTRAMUSCULAR; INTRAVENOUS
Status: DISCONTINUED | OUTPATIENT
Start: 2022-07-07 | End: 2022-07-07

## 2022-07-07 RX ORDER — CEFAZOLIN SODIUM 2 G/50ML
2 SOLUTION INTRAVENOUS ONCE
Status: COMPLETED | OUTPATIENT
Start: 2022-07-07 | End: 2022-07-07

## 2022-07-07 RX ORDER — SUCCINYLCHOLINE CHLORIDE 20 MG/ML
INJECTION INTRAMUSCULAR; INTRAVENOUS
Status: DISCONTINUED | OUTPATIENT
Start: 2022-07-07 | End: 2022-07-07

## 2022-07-07 RX ORDER — ONDANSETRON 2 MG/ML
INJECTION INTRAMUSCULAR; INTRAVENOUS
Status: DISCONTINUED | OUTPATIENT
Start: 2022-07-07 | End: 2022-07-07

## 2022-07-07 RX ORDER — LIDOCAINE HYDROCHLORIDE 40 MG/ML
SOLUTION TOPICAL
Status: DISCONTINUED | OUTPATIENT
Start: 2022-07-07 | End: 2022-07-07

## 2022-07-07 RX ORDER — GUAIFENESIN/DEXTROMETHORPHAN 100-10MG/5
20 SYRUP ORAL EVERY 6 HOURS PRN
COMMUNITY
End: 2023-01-04

## 2022-07-07 RX ORDER — LIDOCAINE HYDROCHLORIDE 20 MG/ML
JELLY TOPICAL
Status: DISCONTINUED | OUTPATIENT
Start: 2022-07-07 | End: 2022-07-07

## 2022-07-07 RX ORDER — PROPOFOL 10 MG/ML
VIAL (ML) INTRAVENOUS
Status: DISCONTINUED | OUTPATIENT
Start: 2022-07-07 | End: 2022-07-07

## 2022-07-07 RX ORDER — HYDROCODONE BITARTRATE AND ACETAMINOPHEN 5; 325 MG/1; MG/1
1 TABLET ORAL EVERY 6 HOURS PRN
Qty: 12 TABLET | Refills: 0
Start: 2022-07-07 | End: 2023-01-04

## 2022-07-07 RX ORDER — LIDOCAINE HYDROCHLORIDE 20 MG/ML
INJECTION, SOLUTION EPIDURAL; INFILTRATION; INTRACAUDAL; PERINEURAL
Status: DISCONTINUED | OUTPATIENT
Start: 2022-07-07 | End: 2022-07-07

## 2022-07-07 RX ORDER — SODIUM CHLORIDE, SODIUM LACTATE, POTASSIUM CHLORIDE, CALCIUM CHLORIDE 600; 310; 30; 20 MG/100ML; MG/100ML; MG/100ML; MG/100ML
INJECTION, SOLUTION INTRAVENOUS CONTINUOUS PRN
Status: DISCONTINUED | OUTPATIENT
Start: 2022-07-07 | End: 2022-07-07

## 2022-07-07 RX ORDER — FAMOTIDINE 10 MG/ML
INJECTION INTRAVENOUS
Status: DISCONTINUED | OUTPATIENT
Start: 2022-07-07 | End: 2022-07-07

## 2022-07-07 RX ORDER — FENTANYL CITRATE 50 UG/ML
INJECTION, SOLUTION INTRAMUSCULAR; INTRAVENOUS
Status: DISCONTINUED | OUTPATIENT
Start: 2022-07-07 | End: 2022-07-07

## 2022-07-07 RX ADMIN — SUCCINYLCHOLINE CHLORIDE 100 MG: 20 INJECTION, SOLUTION INTRAMUSCULAR; INTRAVENOUS at 09:07

## 2022-07-07 RX ADMIN — MIDAZOLAM HYDROCHLORIDE 1 MG: 1 INJECTION, SOLUTION INTRAMUSCULAR; INTRAVENOUS at 09:07

## 2022-07-07 RX ADMIN — ACETAMINOPHEN 1000 MG: 10 INJECTION, SOLUTION INTRAVENOUS at 09:07

## 2022-07-07 RX ADMIN — ROCURONIUM BROMIDE 5 MG: 10 INJECTION, SOLUTION INTRAVENOUS at 09:07

## 2022-07-07 RX ADMIN — LIDOCAINE HYDROCHLORIDE 4 ML: 40 SOLUTION TOPICAL at 09:07

## 2022-07-07 RX ADMIN — LIDOCAINE HYDROCHLORIDE 2 ML: 20 JELLY TOPICAL at 09:07

## 2022-07-07 RX ADMIN — ONDANSETRON 4 MG: 2 INJECTION INTRAMUSCULAR; INTRAVENOUS at 09:07

## 2022-07-07 RX ADMIN — DEXAMETHASONE SODIUM PHOSPHATE 8 MG: 4 INJECTION, SOLUTION INTRAMUSCULAR; INTRAVENOUS at 09:07

## 2022-07-07 RX ADMIN — SODIUM CHLORIDE, SODIUM LACTATE, POTASSIUM CHLORIDE, AND CALCIUM CHLORIDE: .6; .31; .03; .02 INJECTION, SOLUTION INTRAVENOUS at 09:07

## 2022-07-07 RX ADMIN — FAMOTIDINE 20 MG: 10 INJECTION, SOLUTION INTRAVENOUS at 09:07

## 2022-07-07 RX ADMIN — FENTANYL CITRATE 50 MCG: 50 INJECTION INTRAMUSCULAR; INTRAVENOUS at 09:07

## 2022-07-07 RX ADMIN — CEFAZOLIN SODIUM 2 G: 2 SOLUTION INTRAVENOUS at 09:07

## 2022-07-07 RX ADMIN — SUGAMMADEX 200 MG: 100 INJECTION, SOLUTION INTRAVENOUS at 10:07

## 2022-07-07 RX ADMIN — LIDOCAINE HYDROCHLORIDE 80 MG: 20 INJECTION, SOLUTION INTRAVENOUS at 09:07

## 2022-07-07 RX ADMIN — PROPOFOL 130 MG: 10 INJECTION, EMULSION INTRAVENOUS at 09:07

## 2022-07-07 RX ADMIN — ROCURONIUM BROMIDE 35 MG: 10 INJECTION, SOLUTION INTRAVENOUS at 09:07

## 2022-07-07 NOTE — H&P
[facility]  History & Physical    SUBJECTIVE:     Chief Complaint/Reason for Admission:     History of Present Illness:  Patient is a 66 y.o. male presents with cystoscopic findings of bladder tumor  Here for bladder tumor resection    Facility-Administered Medications Prior to Admission   Medication    Allergy Mix     PTA Medications   Medication Sig    acetaminophen (TYLENOL) 500 MG tablet Take 500 mg by mouth every 6 (six) hours as needed for Pain.    dextromethorphan-guaiFENesin  mg/5 ml (ROBITUSSIN-DM)  mg/5 mL liquid Take 20 mLs by mouth every 6 (six) hours as needed.    levocetirizine (XYZAL) 5 MG tablet TAKE 1 TABLET BY MOUTH TWICE A DAY (Patient taking differently: Take 5 mg by mouth. TAKE 1 TABLET BY MOUTH TWICE A DAY)       Review of patient's allergies indicates:  No Known Allergies    Past Medical History:   Diagnosis Date    Allergic rhinitis     Cancer     skin    DVT (deep vein thrombosis) in pregnancy 2021    right axillary    Hx of seasonal allergies 2000    Legionella pneumonia     Lupus anticoagulant positive 12/28/2021 4/6/22 pt states negative    Right shoulder pain 2018    Seborrhea-like dermatitis with psoriasiform elements     Sleep apnea 2017    uses cpap nightly    SVT (supraventricular tachycardia) 2017    Transfusion reaction     hep c-- 1980's & treated     Past Surgical History:   Procedure Laterality Date    APPENDECTOMY      COLON SURGERY      FLEXIBLE SIGMOIDOSCOPY N/A 4/8/2022    Procedure: SIGMOIDOSCOPY, FLEXIBLE;  Surgeon: Michael Avalos MD;  Location: [facility] ENDO;  Service: Endoscopy;  Laterality: N/A;    LUMBAR DISC SURGERY  2007    pet scan      right shoulder surgery  2004 and 2019    x2     SHOULDER ARTHROSCOPY Right 8/28/2019    Procedure: ARTHROSCOPY, SHOULDER;  Surgeon: Stone Suarez MD;  Location: [facility] OR;  Service: Orthopedics;  Laterality: Right;  ARTHREX NOTIFIED    total proctocolectomy  1985    WISDOM TOOTH  EXTRACTION       Family History   Problem Relation Age of Onset    Hypertension Mother     Allergic rhinitis Neg Hx     Allergies Neg Hx     Angioedema Neg Hx     Asthma Neg Hx     Eczema Neg Hx     Atopy Neg Hx     Immunodeficiency Neg Hx     Rhinitis Neg Hx     Urticaria Neg Hx      Social History     Tobacco Use    Smoking status: Never Smoker    Smokeless tobacco: Never Used   Substance Use Topics    Alcohol use: No    Drug use: No        Review of Systems:  Negative    OBJECTIVE:     Vital Signs (Most Recent):  Temp: 98.1 °F (36.7 °C) (07/07/22 0725)  Pulse: 76 (07/07/22 0725)  Resp: 16 (07/07/22 0725)  BP: (!) 170/88 (07/07/22 0725)  SpO2: 98 % (07/07/22 0725)    Inpatient Medications:  Current Facility-Administered Medications   Medication Dose Route Frequency Provider Last Rate Last Admin    cefazolin (ANCEF) 2 gram in dextrose 5% 50 mL IVPB (premix)  2 g Intravenous Once Garland Teixeira MD              ASSESSMENT/PLAN:         Bladder tumor        Planning for TURBT large

## 2022-07-07 NOTE — ANESTHESIA PREPROCEDURE EVALUATION
07/07/2022  Faisal Quiros is a 66 y.o., male.      Patient Active Problem List   Diagnosis    Allergic rhinitis    Seborrhea-like dermatitis with psoriasiform elements    Non-seasonal allergic rhinitis due to pollen    Legionella pneumonia    Nontraumatic tear of rotator cuff, right    History of colectomy    Urinary incontinence    Axillary mass, right    Acute venous embolism and thrombosis of axillary veins, right    History of ulcerative colitis    MTHFR mutation    BMI 23.0-23.9, adult    Benign prostatic hyperplasia    Seasonal allergic rhinitis due to pollen    Syncope    Bladder tumor       Past Surgical History:   Procedure Laterality Date    APPENDECTOMY      COLON SURGERY      FLEXIBLE SIGMOIDOSCOPY N/A 4/8/2022    Procedure: SIGMOIDOSCOPY, FLEXIBLE;  Surgeon: Michael Avalos MD;  Location: [facility] ENDO;  Service: Endoscopy;  Laterality: N/A;    LUMBAR DISC SURGERY  2007    pet scan      right shoulder surgery  2004 and 2019    x2     SHOULDER ARTHROSCOPY Right 8/28/2019    Procedure: ARTHROSCOPY, SHOULDER;  Surgeon: Stone Suarez MD;  Location: [facility] OR;  Service: Orthopedics;  Laterality: Right;  ARTHREX NOTIFIED    total proctocolectomy  1985    WISDOM TOOTH EXTRACTION          Tobacco Use:  The patient  reports that he has never smoked. He has never used smokeless tobacco.     Results for orders placed or performed during the hospital encounter of 06/21/22   EKG and show to ED MD    Collection Time: 06/21/22  7:36 AM    Narrative    Test Reason : R55,    Vent. Rate : 069 BPM     Atrial Rate : 069 BPM     P-R Int : 428 ms          QRS Dur : 086 ms      QT Int : 388 ms       P-R-T Axes : 066 054 054 degrees     QTc Int : 415 ms    Sinus rhythm with 1st degree A-V block  Cannot rule out Anterior infarct ,age undetermined  Abnormal ECG  When compared with ECG of  21-AUG-2019 08:17,  No significant change was found  Confirmed by Mahad VILLANUEVA, Kennedy CHOI ([id number]) on 6/25/2022 1:05:24 PM    Referred By:  LEANA VILLANUEVA           Confirmed By:Kennedy Tobin MD             Lab Results   Component Value Date    WBC 7.58 06/22/2022    HGB 13.8 (L) 06/22/2022    HCT 42.2 06/22/2022     (H) 06/22/2022     (L) 06/22/2022     BMP  Lab Results   Component Value Date     06/22/2022    K 4.3 06/22/2022     06/22/2022    CO2 25 06/22/2022    BUN 12 06/22/2022    CREATININE 0.7 06/22/2022    CALCIUM 8.6 (L) 06/22/2022    ANIONGAP 8 06/22/2022    GLU 96 06/22/2022     (H) 06/21/2022    GLU 98 03/31/2022       No results found for this or any previous visit.        Pre-op Assessment    I have reviewed the Patient Summary Reports.     I have reviewed the Nursing Notes. I have reviewed the NPO Status.   I have reviewed the Medications.     Review of Systems  Anesthesia Hx:  No problems with previous Anesthesia  Denies Family Hx of Anesthesia complications.   Denies Personal Hx of Anesthesia complications.   Social:  No Alcohol Use, Non-Smoker    Hematology/Oncology:        Hematology Comments:   MTHFR mutation  History of DVT Acute venous embolism and thrombosis of axillary veins, right  History of Transfusion reaction.  He is not on any blood thinners. --  Cancer in past history:  surgery    EENT/Dental:   chronic allergic rhinitis   Cardiovascular:   ECG has been reviewed. History of SVT (supraventricular tachycardia)  Recent syncope    Pulmonary:   Sleep Apnea History of Legionella pneumonia   Education provided regarding risk of obstructive sleep apnea     Renal/:   BPH Bladder Tumor  Other Renal / Gu Conditions: (Post micturition syncope.  The patient hit his head, but there was no bleeding.)   Musculoskeletal:   History of lumbar disc surgery  Spine Disorders: lumbar    Dermatological:   Seborrhea-like dermatitis with psoriasiform elements       Physical  Exam  General: Well nourished, Cooperative, Alert and Oriented    Dental:  Intact    Chest/Lungs:  Clear to auscultation, Normal Respiratory Rate    Heart:  Rate: Normal  Rhythm: Regular Rhythm  Sounds: Normal        Anesthesia Plan  Type of Anesthesia, risks & benefits discussed:    Anesthesia Type: Gen ETT  Intra-op Monitoring Plan: Standard ASA Monitors  Post Op Pain Control Plan: multimodal analgesia and IV/PO Opioids PRN  Induction:  IV  Airway Plan: Direct and Video, Post-Induction  Informed Consent: Informed consent signed with the Patient and all parties understand the risks and agree with anesthesia plan.  All questions answered.   ASA Score: 3  Anesthesia Plan Notes: GETA  LTA  Benadryl 6.25 mg iv, Decadron 8 mg, iv Zofran 4 mg iv, Pepcid 20 mg iv   Ofirmev 1000 mg iv.    Ready For Surgery From Anesthesia Perspective.     .

## 2022-07-07 NOTE — TRANSFER OF CARE
"Anesthesia Transfer of Care Note    Patient: Faisal Quiros    Procedure(s) Performed: Procedure(s) (LRB):  CYSTOSCOPY (N/A)  TURBT (TRANSURETHRAL RESECTION OF BLADDER TUMOR) (N/A)    Patient location: PACU    Anesthesia Type: general    Transport from OR: Transported from OR on room air with adequate spontaneous ventilation    Post pain: adequate analgesia    Post assessment: no apparent anesthetic complications and tolerated procedure well    Post vital signs: stable    Level of consciousness: awake, alert and oriented    Nausea/Vomiting: no nausea/vomiting    Complications: none    Transfer of care protocol was followed      Last vitals:   Visit Vitals  BP (!) 177/88 (BP Location: Right arm, Patient Position: Lying)   Pulse 73   Temp 36.4 °C (97.5 °F) (Temporal)   Resp 18   Ht 6' 1" (1.854 m)   Wt 79.1 kg (174 lb 6.4 oz)   SpO2 98%   BMI 23.01 kg/m²     "

## 2022-07-07 NOTE — ANESTHESIA POSTPROCEDURE EVALUATION
"Anesthesia Post Evaluation    Patient: Faisal Quiros    Procedure(s) Performed: Procedure(s) (LRB):  CYSTOSCOPY (N/A)  TURBT (TRANSURETHRAL RESECTION OF BLADDER TUMOR) (N/A)    Final Anesthesia Type: general      Patient location during evaluation: PACU  Patient participation: Yes- Able to Participate  Level of consciousness: awake and alert  Post-procedure vital signs: reviewed and stable  Pain management: adequate  Airway patency: patent    PONV status at discharge: No PONV  Anesthetic complications: no      Cardiovascular status: blood pressure returned to baseline and stable  Respiratory status: unassisted and room air  Hydration status: euvolemic  Follow-up not needed.  Comments: Patient reports "white coat syndrome".  He pressure is normally high post procedures, and it normalizes shortly after.          Vitals Value Taken Time   /94 07/07/22 1045   Temp 36.4 °C (97.5 °F) 07/07/22 1045   Pulse 55 07/07/22 1058   Resp 19 07/07/22 1058   SpO2 99 % 07/07/22 1058   Vitals shown include unvalidated device data.      No case tracking events are documented in the log.      Pain/Lamar Score: Lamar Score: 10 (7/7/2022 10:45 AM)        "

## 2022-07-07 NOTE — OP NOTE
Operative Note         SUMMARY     Surgery Date:  7/7/2022     Assistant:     Indications:  66-year-old male  With bladder tumor  Here for TURBT      Pre-op Diagnosis:   Bladder cancer    Post-op Diagnosis:  Same    Procedure:  TURBT medium    Anesthesia: General    Description of Procedure:   Patient brought to cystoscopy suite.  Placed in the cystoscopy position.  Patient is prepped and draped.  Anesthesia is given.  We enter the urinary bladder with the 21 fr cystoscope.  Once inside the bladder we inspect the entire bladder  We identified the above-mentioned tumor on the left lateral wall  No other lesions were noted in the orifices were within normal limits  We then placed our resectoscope and resect the tumor to completion  Tumor chips were evacuated  Hemostasis obtained and the telescope was removed  Three way Brown catheter was placed  Brought to recovery in good condition    Findings/Key Components:          Estimated Blood Loss:      Minimal

## 2022-07-07 NOTE — DISCHARGE SUMMARY
Patient comes in for TURBT    Procedure is done w no complication    After a brief stay in recovery, patient is discharged in good condition    Meds given:  Lortab Bactrim    F/u office:  1 week

## 2022-07-07 NOTE — ANESTHESIA PROCEDURE NOTES
Intubation    Date/Time: 7/7/2022 9:41 AM  Performed by: Adriana Cueto CRNA  Authorized by: Christiano Chacon MD     Intubation:     Induction:  Intravenous    Intubated:  Postinduction    Mask Ventilation:  Easy with oral airway    Attempts:  1    Attempted By:  CRNA    Method of Intubation:  Video laryngoscopy    Blade:  Branch 4    Laryngeal View Grade: Grade IIA - cords partially seen      Difficult Airway Encountered?: No      Complications:  None    Airway Device:  Oral endotracheal tube    Airway Device Size:  7.5    Style/Cuff Inflation:  Cuffed    Tube secured:  22    Secured at:  The lips    Placement Verified By:  Capnometry    Complicating Factors:  None    Findings Post-Intubation:  BS equal bilateral and atraumatic/condition of teeth unchanged

## 2022-07-13 ENCOUNTER — CLINICAL SUPPORT (OUTPATIENT)
Dept: ALLERGY | Facility: CLINIC | Age: 67
End: 2022-07-13
Payer: MEDICARE

## 2022-07-13 VITALS
DIASTOLIC BLOOD PRESSURE: 76 MMHG | BODY MASS INDEX: 23.06 KG/M2 | OXYGEN SATURATION: 97 % | WEIGHT: 174 LBS | SYSTOLIC BLOOD PRESSURE: 130 MMHG | HEIGHT: 73 IN | HEART RATE: 80 BPM

## 2022-07-13 DIAGNOSIS — J30.1 SEASONAL ALLERGIC RHINITIS DUE TO POLLEN: ICD-10-CM

## 2022-07-13 PROCEDURE — 95117 PR IMMU2THERAPY, 2+ INJECTIONS: ICD-10-PCS | Mod: S$GLB,,, | Performed by: ALLERGY & IMMUNOLOGY

## 2022-07-13 PROCEDURE — 95117 IMMUNOTHERAPY INJECTIONS: CPT | Mod: S$GLB,,, | Performed by: ALLERGY & IMMUNOLOGY

## 2022-07-13 NOTE — PROGRESS NOTES
Immunotherapy Note: Allergy Shot      Subjective:       Patient ID: Faisal Quiros is a 66 y.o. male presents for monthly immunotherapy  Tolerated last injection  No New Medications  Beta Blockers: not on beta blocker    Reaction with last injection?: No  If female, are you pregnant?: No  Are you on any beta blockers?: No  Has the vial ?: No    Are you ill today?: No  Asthma exacerbation or symptoms: No  Do you have a fever today?: No    Peak Flow: 600    Expiration Date #1: 06/10/23  Vial Concentration: 1:1 v/v (RED)  Dose (mL) #1: 0.4 mL (new vial)  Location: Right upper arm  Given By: rw    Expiration Date #2: 22  Vial Concentration: 1:1 v/v (RED)  Dose (mL) #2: 0.4 mL  Location: Left upper arm  Given By : rw      Objective:     Faisal Quiros observed for 30 minutes and discharged in good condition        Discussion:     Pt understands the current recommendation. All questions answered to the best of my ability. Continue current management plan.      Electronically signed by Neris Babcock LPN    Allergy/Imunology  [facility]  [place] [address] [address]  [place] [address]   Office [phone]

## 2022-07-25 ENCOUNTER — PATIENT MESSAGE (OUTPATIENT)
Dept: FAMILY MEDICINE | Facility: CLINIC | Age: 67
End: 2022-07-25
Payer: MEDICARE

## 2022-07-25 DIAGNOSIS — Z99.89 CPAP (CONTINUOUS POSITIVE AIRWAY PRESSURE) DEPENDENCE: Primary | ICD-10-CM

## 2022-08-04 ENCOUNTER — TELEPHONE (OUTPATIENT)
Dept: FAMILY MEDICINE | Facility: CLINIC | Age: 67
End: 2022-08-04
Payer: MEDICARE

## 2022-08-04 NOTE — TELEPHONE ENCOUNTER
----- Message from Portillo Mcclain sent at 8/4/2022  3:28 PM CDT -----  Contact: [facility]  Type: Needs Medical Advice  Who Called:  [facility]  Best Call Back Number: [id number]  Additional Information: Filter codes [id number] missing on CPAP supply request form sent 7/25 Fax: [phone]

## 2022-08-05 DIAGNOSIS — Z99.89 CPAP (CONTINUOUS POSITIVE AIRWAY PRESSURE) DEPENDENCE: Primary | ICD-10-CM

## 2022-08-10 ENCOUNTER — CLINICAL SUPPORT (OUTPATIENT)
Dept: ALLERGY | Facility: CLINIC | Age: 67
End: 2022-08-10
Payer: MEDICARE

## 2022-08-10 VITALS
OXYGEN SATURATION: 98 % | BODY MASS INDEX: 23.06 KG/M2 | DIASTOLIC BLOOD PRESSURE: 70 MMHG | HEIGHT: 73 IN | HEART RATE: 71 BPM | WEIGHT: 174 LBS | SYSTOLIC BLOOD PRESSURE: 130 MMHG

## 2022-08-10 DIAGNOSIS — J30.1 SEASONAL ALLERGIC RHINITIS DUE TO POLLEN: ICD-10-CM

## 2022-08-10 PROCEDURE — 95117 PR IMMU2THERAPY, 2+ INJECTIONS: ICD-10-PCS | Mod: S$GLB,,, | Performed by: ALLERGY & IMMUNOLOGY

## 2022-08-10 PROCEDURE — 95117 IMMUNOTHERAPY INJECTIONS: CPT | Mod: S$GLB,,, | Performed by: ALLERGY & IMMUNOLOGY

## 2022-08-10 NOTE — PROGRESS NOTES
Immunotherapy Note: Allergy Shot      Subjective:       Patient ID: Faisal Quiros is a 66 y.o. male presents for monthly immunotherapy  Tolerated last injection  No New Medications  Beta Blockers: not on beta blocker    Reaction with last injection?: No  If female, are you pregnant?: No  Are you on any beta blockers?: No  Has the vial ?: No    Are you ill today?: No  Asthma exacerbation or symptoms: No  Do you have a fever today?: No    Peak Flow: 650    Expiration Date #1: 06/10/23  Vial Concentration: 1:1 v/v (RED)  Dose (mL) #1: 0.5 mL  Location: Right upper arm  Given By: rw    Expiration Date #2: 06/10/23  Vial Concentration: 1:1 v/v (RED)  Dose (mL) #2: 0.5 mL  Location: Left upper arm  Given By : rw      Objective:     Faisal Quiros observed for 30 minutes and discharged in good condition        Discussion:     Pt understands the current recommendation. All questions answered to the best of my ability. Continue current management plan.      Electronically signed by Neris Babcock LPN    Allergy/Imunology  [facility]  [address]  [place] [address]   Office [phone]

## 2022-10-25 ENCOUNTER — PATIENT MESSAGE (OUTPATIENT)
Dept: FAMILY MEDICINE | Facility: CLINIC | Age: 67
End: 2022-10-25
Payer: MEDICARE

## 2022-10-26 DIAGNOSIS — Z99.89 CPAP (CONTINUOUS POSITIVE AIRWAY PRESSURE) DEPENDENCE: Primary | ICD-10-CM

## 2022-11-09 LAB
ALBUMIN SERPL-MCNC: 4.1 G/DL (ref 3.8–4.8)
ALBUMIN/GLOB SERPL: 1.8 {RATIO} (ref 1.2–2.2)
ALP SERPL-CCNC: 79 IU/L (ref 44–121)
ALT SERPL-CCNC: 24 IU/L (ref 0–44)
AST SERPL-CCNC: 24 IU/L (ref 0–40)
BASOPHILS # BLD AUTO: 0 X10E3/UL (ref 0–0.2)
BASOPHILS NFR BLD AUTO: 1 %
BILIRUB SERPL-MCNC: 0.6 MG/DL (ref 0–1.2)
BUN SERPL-MCNC: 17 MG/DL (ref 8–27)
BUN/CREAT SERPL: 18 (ref 10–24)
CALCIUM SERPL-MCNC: 8.8 MG/DL (ref 8.6–10.2)
CHLORIDE SERPL-SCNC: 106 MMOL/L (ref 96–106)
CO2 SERPL-SCNC: 26 MMOL/L (ref 20–29)
CREAT SERPL-MCNC: 0.92 MG/DL (ref 0.76–1.27)
EOSINOPHIL # BLD AUTO: 0.1 X10E3/UL (ref 0–0.4)
EOSINOPHIL NFR BLD AUTO: 2 %
ERYTHROCYTE [DISTWIDTH] IN BLOOD BY AUTOMATED COUNT: 12.1 % (ref 11.6–15.4)
EST. GFR  (NO RACE VARIABLE): 92 ML/MIN/1.73
GLOBULIN SER CALC-MCNC: 2.3 G/DL (ref 1.5–4.5)
GLUCOSE SERPL-MCNC: 94 MG/DL (ref 70–99)
HCT VFR BLD AUTO: 43.1 % (ref 37.5–51)
HCYS SERPL-SCNC: 7.6 UMOL/L (ref 0–17.2)
HGB BLD-MCNC: 14.4 G/DL (ref 13–17.7)
IMM GRANULOCYTES # BLD AUTO: 0 X10E3/UL (ref 0–0.1)
IMM GRANULOCYTES NFR BLD AUTO: 0 %
LYMPHOCYTES # BLD AUTO: 1.6 X10E3/UL (ref 0.7–3.1)
LYMPHOCYTES NFR BLD AUTO: 30 %
MCH RBC QN AUTO: 33.6 PG (ref 26.6–33)
MCHC RBC AUTO-ENTMCNC: 33.4 G/DL (ref 31.5–35.7)
MCV RBC AUTO: 101 FL (ref 79–97)
MONOCYTES # BLD AUTO: 0.4 X10E3/UL (ref 0.1–0.9)
MONOCYTES NFR BLD AUTO: 8 %
NEUTROPHILS # BLD AUTO: 3.1 X10E3/UL (ref 1.4–7)
NEUTROPHILS NFR BLD AUTO: 59 %
PLATELET # BLD AUTO: 227 X10E3/UL (ref 150–450)
POTASSIUM SERPL-SCNC: 4.1 MMOL/L (ref 3.5–5.2)
PROT SERPL-MCNC: 6.4 G/DL (ref 6–8.5)
RBC # BLD AUTO: 4.28 X10E6/UL (ref 4.14–5.8)
SODIUM SERPL-SCNC: 143 MMOL/L (ref 134–144)
WBC # BLD AUTO: 5.2 X10E3/UL (ref 3.4–10.8)

## 2022-11-15 ENCOUNTER — TELEPHONE (OUTPATIENT)
Dept: FAMILY MEDICINE | Facility: CLINIC | Age: 67
End: 2022-11-15
Payer: MEDICARE

## 2022-11-15 DIAGNOSIS — Z99.89 CPAP (CONTINUOUS POSITIVE AIRWAY PRESSURE) DEPENDENCE: Primary | ICD-10-CM

## 2022-11-16 NOTE — PROGRESS NOTES
"[facility] Hematology/Oncology  PROGRESS NOTE -  Follow-up Visit      Subjective:       Patient ID:   NAME: Faisal Quiros : 1955     66 y.o. male    Referring Doc: Milan Chen III, *  Other Physicians: Keith Duenas Albright; Goyo Lui           Chief Complaint: Rght axillary vein clot f/u     History of Present Illness:     Patient returns today for a regularly scheduled follow-up visit.  The patient is here today to go over the results of the recently ordered labs, tests and studies. He is here by himself.     He is feeling "good". Breathing ok. No excessive bleeding or bruising.    He was recently found to have low grade bladder cancer in 2022 with planned repeat scopes 4x per year and for which he is followed by Dr Teixeira    He previously had seen Dr Avalos on 2022 and had scope which was clear. Hep C PCR was good         He saw Dr Chen in 2022; he had a syncopal episode this past summer which he suspects was due to the flomax    Discussed covid 19 precautions - s/p vaccinations          ROS:   GEN: normal without any fever, night sweats or weight loss  HEENT: normal with no HA's, sore throat, stiff neck, changes in vision  CV: normal with no CP, SOB, PND, MAK or orthopnea  PULM: normal with no SOB, cough, hemoptysis, sputum or pleuritic pain  GI: normal with no abdominal pain, nausea, vomiting, constipation, diarrhea, melanotic stools, BRBPR, or hematemesis  : normal with no hematuria, dysuria  BREAST: normal with no mass, discharge, pain  SKIN: normal with no rash, erythema, bruising, or swelling    Pain Scale:  0    Allergies:  Review of patient's allergies indicates:  No Known Allergies    Medications:    Current Outpatient Medications:     acetaminophen (TYLENOL) 500 MG tablet, Take 500 mg by mouth every 6 (six) hours as needed for Pain., Disp: , Rfl:     dextromethorphan-guaiFENesin  mg/5 ml (ROBITUSSIN-DM)  mg/5 mL liquid, Take 20 mLs by mouth " every 6 (six) hours as needed., Disp: , Rfl:     levocetirizine (XYZAL) 5 MG tablet, TAKE 1 TABLET BY MOUTH TWICE A DAY (Patient taking differently: Take 5 mg by mouth. TAKE 1 TABLET BY MOUTH TWICE A DAY), Disp: 60 tablet, Rfl: 3    HYDROcodone-acetaminophen (NORCO) 5-325 mg per tablet, Take 1 tablet by mouth every 6 (six) hours as needed for Pain., Disp: 12 tablet, Rfl: 0    sulfamethoxazole-trimethoprim 800-160mg (BACTRIM DS) 800-160 mg Tab, Take 1 tablet by mouth once daily, Disp: 7 tablet, Rfl: 0    Current Facility-Administered Medications:     Allergy Mix, , Subcutaneous, Q30 Days, Karen Parker MD, Given at 07/13/22 0821    PMHx/PSHx Updates:  See patient's last visit with me on 5/17/2022.  See H&P on 11/24/2021        Pathology:   Cancer Staging   No matching staging information was found for the patient.    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   - NO STROMAL INVASION IS PRESENT.   - MUSCULARIS PROPRIA (DETRUSOR MUSCLE) IS NOT REPRESENTED.         Objective:     Vitals:  Blood pressure 127/83, pulse 70, temperature 98.1 °F (36.7 °C), resp. rate 18, height 6' (1.829 m), weight 79.5 kg (175 lb 4.8 oz).    Physical Examination:   GEN: no apparent distress, comfortable; AAOx3  HEAD: atraumatic and normocephalic  EYES: no pallor, no icterus, PERRLA  ENT: OMM, no pharyngeal erythema, external ears WNL; no nasal discharge; no thrush  NECK: no masses, thyroid normal, trachea midline, no LAD/LN's, supple  CV: RRR with no murmur; normal pulse; normal S1 and S2; no pedal edema  CHEST: Normal respiratory effort; CTAB; normal breath sounds; no wheeze or crackles  ABDOM: nontender and nondistended; soft; normal bowel sounds; no rebound/guarding  MUSC/Skeletal: ROM normal; no crepitus; joints normal; no deformities or arthropathy  EXTREM: no clubbing, cyanosis, inflammation or swelling  SKIN: no rashes, lesions, ulcers,  petechiae or subcutaneous nodules  : no casarez  NEURO: grossly intact; motor/sensory WNL; AAOx3; no tremors  PSYCH: normal mood, affect and behavior  LYMPH: normal cervical, supraclavicular, axillary and groin LN's            Labs:     Lab Results   Component Value Date    WBC 5.2 11/08/2022    HGB 14.4 11/08/2022    HCT 43.1 11/08/2022     (H) 11/08/2022     11/08/2022       CMP  Sodium   Date Value Ref Range Status   11/08/2022 143 134 - 144 mmol/L Final   10/22/2018 142 134 - 144 mmol/L      Potassium   Date Value Ref Range Status   11/08/2022 4.1 3.5 - 5.2 mmol/L Final     Chloride   Date Value Ref Range Status   11/08/2022 106 96 - 106 mmol/L Final   10/22/2018 107 98 - 110 mmol/L      CO2   Date Value Ref Range Status   11/08/2022 26 20 - 29 mmol/L Final     Glucose   Date Value Ref Range Status   11/08/2022 94 70 - 99 mg/dL Final   10/22/2018 100 (H) 70 - 99 mg/dL      BUN   Date Value Ref Range Status   11/08/2022 17 8 - 27 mg/dL Final     Creatinine   Date Value Ref Range Status   11/08/2022 0.92 0.76 - 1.27 mg/dL Final   10/22/2018 0.75 0.60 - 1.40 mg/dL      Calcium   Date Value Ref Range Status   11/08/2022 8.8 8.6 - 10.2 mg/dL Final     Total Protein   Date Value Ref Range Status   06/22/2022 6.4 6.0 - 8.4 g/dL Final     Albumin   Date Value Ref Range Status   11/08/2022 4.1 3.8 - 4.8 g/dL Final   06/22/2022 3.4 (L) 3.5 - 5.2 g/dL Final   10/22/2018 4.0 3.1 - 4.7 g/dL      Total Bilirubin   Date Value Ref Range Status   11/08/2022 0.6 0.0 - 1.2 mg/dL Final     Alkaline Phosphatase   Date Value Ref Range Status   06/22/2022 61 55 - 135 U/L Final     AST   Date Value Ref Range Status   11/08/2022 24 0 - 40 IU/L Final     ALT   Date Value Ref Range Status   11/08/2022 24 0 - 44 IU/L Final     Anion Gap   Date Value Ref Range Status   06/22/2022 8 8 - 16 mmol/L Final     eGFR if    Date Value Ref Range Status   06/22/2022 >60.0 >60 mL/min/1.73 m^2 Final     eGFR if non African  American   Date Value Ref Range Status   06/22/2022 >60.0 >60 mL/min/1.73 m^2 Final     Comment:     Calculation used to obtain the estimated glomerular filtration  rate (eGFR) is the CKD-EPI equation.            Homocyst(e)ine 0.0 - 17.2 umol/L 7.6                 Radiology/Diagnostic Studies:    CT Chest With Contrast    Result Date: 11/30/2021  CMS MANDATED QUALITY DATA - CT RADIATION 436 All CT scans at this facility utilize dose modulation, iterative reconstruction, and/or weight based dosing when appropriate to reduce radiation dose to as low as reasonably achievable. CLINICAL HISTORY: 66 years (1955) Male clot TECHNIQUE: CT CHEST WITH IV CONTRAST. 342 images obtained. Axial CT images of the chest were obtained after the uneventful administration of IV contrast. Soft tissue and lung windows were sent for review. Sagittal and coronal reformats were performed by the radiologist. CONTRAST: 100 mL of IV Omni 350 was administered uneventfully by IV COMPARISON: Ultrasound from 8/10/2021 FINDINGS:. Visualized neck: Within normal limits. Lungs: The lungs are essentially clear with no concerning pulmonary nodule or mass. There is a 6 mm calcified granuloma in the central left upper lobe (image 64). Airway: The trachea and central bronchial tree appear normal. Pleura: There is no pleural effusion. There is no pneumothorax. Cardiovascular: The heart is normal in size. There is no pericardial effusion. The thoracic aorta and great vessels are normal in course and caliber. Note that the contrast bolus timing is not targeted for evaluation of pulmonary embolus or deep venous thrombosis in the upper extremities. Mediastinum: There is no supraclavicular, axillary, mediastinal, or hilar lymphadenopathy. Soft tissues: The peripheral soft tissues appear normal. Musculoskeletal: There are moderate degenerative changes of the thoracic spine.  There are no suspicious osseous lesions. Esophagus: The esophagus appears grossly  normal. Upper Abdomen: No acute abnormality is seen in the upper abdomen. Relative diffuse low-attenuation liver in keeping with steatosis. Rounded low-attenuation structures are seen in the liver, incompletely assessed on this nontargeted exam, but suggestive of cysts. Partially imaged is a 5.5 cm simple fluid attenuation structure in the left kidney favored to represent a cyst. IMPRESSION: 1. No acute cardiac or pulmonary process. 2. No concerning pulmonary nodule, mass or lymphadenopathy to suggest malignancy. 3. Additional, and incidental findings as outlined above. . Electronically signed by:  Manfred Silva MD  2021 10:37 AM [facility] Workstation: [id number]      CT Abdom/pelvis 10/22/2021:     IMPRESSION:     1.  Patchy haziness of the central mesenteric fat with multiple shoddy lymph nodes a similar appearance compared with exam from 2017. 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  2.  Postsurgical changes of colectomy noted.  3.  Multiple hepatic and renal cysts.  4.  Mild prostatomegaly.     US Abdom  10/19/2021:     IMPRESSION:     1.  Cholelithiasis.  2.  Mild bilateral caliectasis versus hydronephrosis.  3.  Bilateral simple renal cysts.  4.  Septated hepatic cyst in the right liver lobe.        US Upper extremity:  10/8/2021     IMPRESSION:  Deep venous thrombosis in the right upper extremity at the level of the axillary vein       I have reviewed all available lab results and radiology reports.    Assessment/Plan:   (1) 66 y.o. male with diagnosis of right axillary vein clot who has been referred by Milan Chen III, * for evaluation by medical hematology/oncology.     -He is on xarelto 20mg po daily. He developed a chord under the right arm/axilla and noticed it while taking a shower. He reports that he did not have any pain at first. He saw his PCP and had US which showed a clot.  -  No prior personal or family history of clots.   - His dad  of stroke but he was of advanced age.    - no excessive bleeding or bruising     12/29/2021:  - MTHFR-C heterozygous positive with normal homocysteine  - LA positive but he is on xarelto  - discussed the genetic implications of the MTHFR  - arm swelling resolved  - discontinue xarelto  - repeat LA in 2-3 months  - folbic and baby aspirin on full stomach    5/17/2022:  - repeat LA was negative  - now off xarelto since Dec 2021  - continue on the folbic or folbic-like supplements    11/17/2022:  - no recent swelling or bruising/bleeding  - repeat homocystiene is good  - continue on his vitamin supplements  - check B12/folate with next labs due to mild macrocytosis without any current anemia        (2) Hx/of Hep C in the 1998 and had Ribavarin and INF  - he required numerous blood transfusion in the '80s due to his UC     (3) Asthma     (4) Hx/of legionella pneumonia and TB positive in past - seen by Dr Lui in past     (5) Eczema and seborrhea-like dermatitis     (6) SEAN     (7) SVT     (8) UC hx with total colectomy in 1985 - followed by Dr Duenas and Dr Avalos with GI     (9) Allergy/Hives issues - on injections with Dr Parker    (10) Recent discovery of low grade bladder ca s/p excision  - He was recently found to have low grade bladder cancer in July 2022 with planned repeat scopes 4x per year and for which he is followed by Dr Teixeira      VISIT DIAGNOSES:      MTHFR mutation        PLAN:  1. F/u with PCP  2. Continue folbic or folbic equivalent supplements  4. Check homocysteine level every 6 months' check B12 and folate with next labs  5. F/u with PCP, Pulm, GI,  etc    RTC in 6 months  Fax note to Milan Chen III, *; Keith Avalos Schuette; Lluvia    Discussion:     COVID-19 Discussion:     I had long discussion with patient and any applicable family about the COVID-19 coronavirus epidemic and the recommended precautions with regard to cancer and/or hematology patients. I have re-iterated the CDC recommendations for  adequate hand washing, use of hand -like products, and coughing into elbow, etc. In addition, especially for our patients who are on chemotherapy and/or our otherwise immunocompromised patients, I have recommended avoidance of crowds, including movie theaters, restaurants, churches, etc. I have recommended avoidance of any sick or symptomatic family members and/or friends. I have also recommended avoidance of any raw and unwashed food products, and general avoidance of food items that have not been prepared by themselves. The patient has been asked to call us immediately with any symptom developments, issues, questions or other general concerns.      Anticoagulation Discussion:     Discussed with patient and any applicable family members about the benefit and/or need for anticoagulation. I communicated about the risks of bleeding while on any anticoagulation, which could be serious and/or life-threatening, and which can occur at any time, regardless of degree of the level of anticoagulation. I expressed the need for compliance with any anticoagulation regimen and that failure to do so could potential lead to excessive bleeding, and risk to health and/or life. In particular, with patients on coumadin therapy, compliance with requested blood work is absolutely essential, as coumadin levels can vary from time to time, and failure to do so could potentially place the patient at risk for bleeding and/or clotting events which could be fatal. Patients on coumadin are encouraged to call the day after they have their levels drawn, as to obtain the appropriate instructions from my staff. Patients are aware that self-regulating or self-dosing of their medications is strictly prohibited.      I spent over 25 mins of time with the patient. Reviewed results of the recently ordered labs, tests and studies; made directives with regards to the results. Over half of this time was spent couseling and coordinating care.    I  have explained all of the above in detail and the patient understands all of the current recommendation(s). I have answered all of their questions to the best of my ability and to their complete satisfaction.   The patient is to continue with the current management plan.            Electronically signed by Ronen Blackman MD                  Answers submitted by the patient for this visit:  Review of Systems Questionnaire (Submitted on 11/14/2022)  appetite change : No  unexpected weight change: No  mouth sores: No  visual disturbance: No  cough: No  shortness of breath: No  chest pain: No  abdominal pain: No  diarrhea: No  frequency: No  back pain: No  rash: No  headaches: No  adenopathy: No  nervous/ anxious: No

## 2022-11-17 ENCOUNTER — OFFICE VISIT (OUTPATIENT)
Dept: HEMATOLOGY/ONCOLOGY | Facility: CLINIC | Age: 67
End: 2022-11-17
Payer: MEDICARE

## 2022-11-17 VITALS
DIASTOLIC BLOOD PRESSURE: 83 MMHG | HEART RATE: 70 BPM | TEMPERATURE: 98 F | WEIGHT: 175.31 LBS | SYSTOLIC BLOOD PRESSURE: 127 MMHG | BODY MASS INDEX: 23.75 KG/M2 | RESPIRATION RATE: 18 BRPM | HEIGHT: 72 IN

## 2022-11-17 DIAGNOSIS — D75.89 MACROCYTOSIS WITHOUT ANEMIA: ICD-10-CM

## 2022-11-17 DIAGNOSIS — D53.9 NUTRITIONAL ANEMIA, UNSPECIFIED: ICD-10-CM

## 2022-11-17 DIAGNOSIS — Z15.89 MTHFR MUTATION: Primary | ICD-10-CM

## 2022-11-17 PROCEDURE — 99213 OFFICE O/P EST LOW 20 MIN: CPT | Mod: S$GLB,,, | Performed by: INTERNAL MEDICINE

## 2022-11-17 PROCEDURE — 3288F PR FALLS RISK ASSESSMENT DOCUMENTED: ICD-10-PCS | Mod: CPTII,S$GLB,, | Performed by: INTERNAL MEDICINE

## 2022-11-17 PROCEDURE — 1160F PR REVIEW ALL MEDS BY PRESCRIBER/CLIN PHARMACIST DOCUMENTED: ICD-10-PCS | Mod: CPTII,S$GLB,, | Performed by: INTERNAL MEDICINE

## 2022-11-17 PROCEDURE — 3008F BODY MASS INDEX DOCD: CPT | Mod: CPTII,S$GLB,, | Performed by: INTERNAL MEDICINE

## 2022-11-17 PROCEDURE — 3074F PR MOST RECENT SYSTOLIC BLOOD PRESSURE < 130 MM HG: ICD-10-PCS | Mod: CPTII,S$GLB,, | Performed by: INTERNAL MEDICINE

## 2022-11-17 PROCEDURE — 1126F AMNT PAIN NOTED NONE PRSNT: CPT | Mod: CPTII,S$GLB,, | Performed by: INTERNAL MEDICINE

## 2022-11-17 PROCEDURE — 1126F PR PAIN SEVERITY QUANTIFIED, NO PAIN PRESENT: ICD-10-PCS | Mod: CPTII,S$GLB,, | Performed by: INTERNAL MEDICINE

## 2022-11-17 PROCEDURE — 1100F PTFALLS ASSESS-DOCD GE2>/YR: CPT | Mod: CPTII,S$GLB,, | Performed by: INTERNAL MEDICINE

## 2022-11-17 PROCEDURE — 1159F PR MEDICATION LIST DOCUMENTED IN MEDICAL RECORD: ICD-10-PCS | Mod: CPTII,S$GLB,, | Performed by: INTERNAL MEDICINE

## 2022-11-17 PROCEDURE — 3288F FALL RISK ASSESSMENT DOCD: CPT | Mod: CPTII,S$GLB,, | Performed by: INTERNAL MEDICINE

## 2022-11-17 PROCEDURE — 1160F RVW MEDS BY RX/DR IN RCRD: CPT | Mod: CPTII,S$GLB,, | Performed by: INTERNAL MEDICINE

## 2022-11-17 PROCEDURE — 3074F SYST BP LT 130 MM HG: CPT | Mod: CPTII,S$GLB,, | Performed by: INTERNAL MEDICINE

## 2022-11-17 PROCEDURE — 3079F DIAST BP 80-89 MM HG: CPT | Mod: CPTII,S$GLB,, | Performed by: INTERNAL MEDICINE

## 2022-11-17 PROCEDURE — 1100F PR PT FALLS ASSESS DOC 2+ FALLS/FALL W/INJURY/YR: ICD-10-PCS | Mod: CPTII,S$GLB,, | Performed by: INTERNAL MEDICINE

## 2022-11-17 PROCEDURE — 3079F PR MOST RECENT DIASTOLIC BLOOD PRESSURE 80-89 MM HG: ICD-10-PCS | Mod: CPTII,S$GLB,, | Performed by: INTERNAL MEDICINE

## 2022-11-17 PROCEDURE — 3008F PR BODY MASS INDEX (BMI) DOCUMENTED: ICD-10-PCS | Mod: CPTII,S$GLB,, | Performed by: INTERNAL MEDICINE

## 2022-11-17 PROCEDURE — 99213 PR OFFICE/OUTPT VISIT, EST, LEVL III, 20-29 MIN: ICD-10-PCS | Mod: S$GLB,,, | Performed by: INTERNAL MEDICINE

## 2022-11-17 PROCEDURE — 1159F MED LIST DOCD IN RCRD: CPT | Mod: CPTII,S$GLB,, | Performed by: INTERNAL MEDICINE

## 2022-12-29 ENCOUNTER — OFFICE VISIT (OUTPATIENT)
Dept: ORTHOPEDICS | Facility: CLINIC | Age: 67
End: 2022-12-29
Payer: MEDICARE

## 2022-12-29 VITALS — HEIGHT: 72 IN | BODY MASS INDEX: 23.7 KG/M2 | WEIGHT: 175 LBS

## 2022-12-29 DIAGNOSIS — M77.11 LATERAL EPICONDYLITIS OF RIGHT ELBOW: Primary | ICD-10-CM

## 2022-12-29 PROCEDURE — 1159F MED LIST DOCD IN RCRD: CPT | Mod: CPTII,S$GLB,, | Performed by: PHYSICIAN ASSISTANT

## 2022-12-29 PROCEDURE — 1160F PR REVIEW ALL MEDS BY PRESCRIBER/CLIN PHARMACIST DOCUMENTED: ICD-10-PCS | Mod: CPTII,S$GLB,, | Performed by: PHYSICIAN ASSISTANT

## 2022-12-29 PROCEDURE — 1160F RVW MEDS BY RX/DR IN RCRD: CPT | Mod: CPTII,S$GLB,, | Performed by: PHYSICIAN ASSISTANT

## 2022-12-29 PROCEDURE — 20550 PR INJECT TENDON SHEATH/LIGAMENT: ICD-10-PCS | Mod: RT,S$GLB,, | Performed by: PHYSICIAN ASSISTANT

## 2022-12-29 PROCEDURE — 1125F AMNT PAIN NOTED PAIN PRSNT: CPT | Mod: CPTII,S$GLB,, | Performed by: PHYSICIAN ASSISTANT

## 2022-12-29 PROCEDURE — 3008F PR BODY MASS INDEX (BMI) DOCUMENTED: ICD-10-PCS | Mod: CPTII,S$GLB,, | Performed by: PHYSICIAN ASSISTANT

## 2022-12-29 PROCEDURE — 1159F PR MEDICATION LIST DOCUMENTED IN MEDICAL RECORD: ICD-10-PCS | Mod: CPTII,S$GLB,, | Performed by: PHYSICIAN ASSISTANT

## 2022-12-29 PROCEDURE — 99213 PR OFFICE/OUTPT VISIT, EST, LEVL III, 20-29 MIN: ICD-10-PCS | Mod: 25,S$GLB,, | Performed by: PHYSICIAN ASSISTANT

## 2022-12-29 PROCEDURE — 3008F BODY MASS INDEX DOCD: CPT | Mod: CPTII,S$GLB,, | Performed by: PHYSICIAN ASSISTANT

## 2022-12-29 PROCEDURE — 99213 OFFICE O/P EST LOW 20 MIN: CPT | Mod: 25,S$GLB,, | Performed by: PHYSICIAN ASSISTANT

## 2022-12-29 PROCEDURE — 3288F PR FALLS RISK ASSESSMENT DOCUMENTED: ICD-10-PCS | Mod: CPTII,S$GLB,, | Performed by: PHYSICIAN ASSISTANT

## 2022-12-29 PROCEDURE — 3288F FALL RISK ASSESSMENT DOCD: CPT | Mod: CPTII,S$GLB,, | Performed by: PHYSICIAN ASSISTANT

## 2022-12-29 PROCEDURE — 20550 NJX 1 TENDON SHEATH/LIGAMENT: CPT | Mod: RT,S$GLB,, | Performed by: PHYSICIAN ASSISTANT

## 2022-12-29 PROCEDURE — 1101F PR PT FALLS ASSESS DOC 0-1 FALLS W/OUT INJ PAST YR: ICD-10-PCS | Mod: CPTII,S$GLB,, | Performed by: PHYSICIAN ASSISTANT

## 2022-12-29 PROCEDURE — 1125F PR PAIN SEVERITY QUANTIFIED, PAIN PRESENT: ICD-10-PCS | Mod: CPTII,S$GLB,, | Performed by: PHYSICIAN ASSISTANT

## 2022-12-29 PROCEDURE — 1101F PT FALLS ASSESS-DOCD LE1/YR: CPT | Mod: CPTII,S$GLB,, | Performed by: PHYSICIAN ASSISTANT

## 2022-12-29 RX ORDER — MULTIVITAMIN
TABLET ORAL
COMMUNITY
Start: 2022-05-22

## 2022-12-29 RX ORDER — PYRIDOXINE HCL (VITAMIN B6) 100 MG
TABLET ORAL
COMMUNITY
Start: 2022-05-22

## 2022-12-29 RX ORDER — KRILL/OM-3/DHA/EPA/PHOSPHO/AST 500-150-45
CAPSULE ORAL
COMMUNITY
Start: 2022-01-01

## 2022-12-29 RX ORDER — TRIAMCINOLONE ACETONIDE 40 MG/ML
40 INJECTION, SUSPENSION INTRA-ARTICULAR; INTRAMUSCULAR
Status: DISCONTINUED | OUTPATIENT
Start: 2022-12-29 | End: 2022-12-29 | Stop reason: HOSPADM

## 2022-12-29 RX ORDER — PHENAZOPYRIDINE HYDROCHLORIDE 200 MG/1
200 TABLET, FILM COATED ORAL 3 TIMES DAILY
COMMUNITY
Start: 2022-07-07 | End: 2023-01-04

## 2022-12-29 RX ORDER — IBUPROFEN 100 MG/5ML
SUSPENSION, ORAL (FINAL DOSE FORM) ORAL
COMMUNITY
Start: 2022-01-01

## 2022-12-29 RX ADMIN — TRIAMCINOLONE ACETONIDE 40 MG: 40 INJECTION, SUSPENSION INTRA-ARTICULAR; INTRAMUSCULAR at 07:12

## 2022-12-29 NOTE — PROGRESS NOTES
[facility] ORTHOPEDICS  [address]  [place] [address]  Phone: [phone]   [phone]    Patient's PCP: Milan Chen III, MD  Referring Provider: No ref. provider found    Subjective:      Chief Complaint:   Chief Complaint   Patient presents with    Right Elbow - Pain     Patient is having right elbow pain, this pain started about one month ago, he report that his elbow is really tender, its also painful to move at times.       Past Medical History:   Diagnosis Date    Allergic rhinitis     Cancer     skin    DVT (deep vein thrombosis) in pregnancy 2021    right axillary    Hx of seasonal allergies 2000    Hx/of venous embolism and thrombosis of axillary veins, right 10/11/2021    Legionella pneumonia     Lupus anticoagulant positive 12/28/2021 4/6/22 pt states negative    Right shoulder pain 2018    Seborrhea-like dermatitis with psoriasiform elements     Sleep apnea 2017    uses cpap nightly    SVT (supraventricular tachycardia) 2017    Transfusion reaction     hep c-- 1980's & treated       Past Surgical History:   Procedure Laterality Date    APPENDECTOMY      COLON SURGERY      CYSTOSCOPY N/A 7/7/2022    Procedure: CYSTOSCOPY;  Surgeon: Garland Teixeira MD;  Location: [facility] OR;  Service: Urology;  Laterality: N/A;    FLEXIBLE SIGMOIDOSCOPY N/A 4/8/2022    Procedure: SIGMOIDOSCOPY, FLEXIBLE;  Surgeon: Michael Avalos MD;  Location: [facility] ENDO;  Service: Endoscopy;  Laterality: N/A;    LUMBAR DISC SURGERY  2007    pet scan      right shoulder surgery  2004 and 2019    x2     SHOULDER ARTHROSCOPY Right 8/28/2019    Procedure: ARTHROSCOPY, SHOULDER;  Surgeon: Stone Suarez MD;  Location: [facility] OR;  Service: Orthopedics;  Laterality: Right;  ARTHREX NOTIFIED    total proctocolectomy  1985    WISDOM TOOTH EXTRACTION         Current Outpatient Medications   Medication Sig    ascorbic acid, vitamin C, (VITAMIN C) 1000 MG tablet     calcium-vitamin D 600 mg-10 mcg (400 unit) Tab      cartilage/collagen II/hyaluron (MOVE FREE ULTRA ORAL)     cyanocobalamin/cobamamide (B12 SL)     dextromethorphan-guaiFENesin  mg/5 ml (ROBITUSSIN-DM)  mg/5 mL liquid Take 20 mLs by mouth every 6 (six) hours as needed.    ferrous fumarate/vit Bcomp,C (SUPER B COMPLEX ORAL)     krill-om-3-dha-epa-phospho-ast (KRILL OIL) 159-072-00-75 mg Cap     levocetirizine (XYZAL) 5 MG tablet TAKE 1 TABLET BY MOUTH TWICE A DAY (Patient taking differently: Take 5 mg by mouth. TAKE 1 TABLET BY MOUTH TWICE A DAY)    multivitamin-minerals-lutein Tab     phenazopyridine (PYRIDIUM) 200 MG tablet Take 200 mg by mouth 3 (three) times daily.    pyridoxine, vitamin B6, (B-6) 100 MG Tab     acetaminophen (TYLENOL) 500 MG tablet Take 500 mg by mouth every 6 (six) hours as needed for Pain.    HYDROcodone-acetaminophen (NORCO) 5-325 mg per tablet Take 1 tablet by mouth every 6 (six) hours as needed for Pain. (Patient not taking: Reported on 12/29/2022)    sulfamethoxazole-trimethoprim 800-160mg (BACTRIM DS) 800-160 mg Tab Take 1 tablet by mouth once daily (Patient not taking: Reported on 12/29/2022)     Current Facility-Administered Medications   Medication    Allergy Mix       Review of patient's allergies indicates:  No Known Allergies    Family History   Problem Relation Age of Onset    Hypertension Mother     Allergic rhinitis Neg Hx     Allergies Neg Hx     Angioedema Neg Hx     Asthma Neg Hx     Eczema Neg Hx     Atopy Neg Hx     Immunodeficiency Neg Hx     Rhinitis Neg Hx     Urticaria Neg Hx        Social History     Socioeconomic History    Marital status:    Tobacco Use    Smoking status: Never    Smokeless tobacco: Never   Substance and Sexual Activity    Alcohol use: No    Drug use: No       Prior to meeting with the patient I reviewed the medical chart in [facility]. This included reviewing the previous progress notes from our office, review of the patient's last appointment with their primary care provider, review of  any visits to the emergency room, and review of any pain management appointments or procedures.    History of present illness:  67-year-old male, with history of lateral epicondylitis, returns to clinic today for follow-up on his right elbow.  He states approximately 1 month ago he had an acute exacerbation of his right elbow.  This was a little bit more intense than what he had experienced before in the past so he was concerned and is here today for follow-up.  Over the course of the last month, symptomatic measures such as use of a commercially available strap, NSAIDs, activity modification have all dramatically lessened or almost completely resolved his symptoms as of today.      Review of Systems:    Constitutional: Negative for chills, fever and weight loss.   HENT: Negative for congestion.    Eyes: Negative for discharge and redness.   Respiratory: Negative for cough and shortness of breath.    Cardiovascular: Negative for chest pain.   Gastrointestinal: Negative for nausea and vomiting.   Musculoskeletal: See HPI.   Skin: Negative for rash.   Neurological: Negative for headaches.   Endo/Heme/Allergies: Does not bruise/bleed easily.   Psychiatric/Behavioral: The patient is not nervous/anxious.    All other systems reviewed and are negative.       Objective:      Physical Examination:    Vital Signs:  There were no vitals filed for this visit.    Body mass index is 23.73 kg/m².    This a well-developed, well nourished patient in no acute distress.  They are alert and oriented and cooperative to examination.     Examination of the right elbow, the skin is dry and intact, no erythema ecchymosis, no signs symptoms of infection.  Range of motion is within normal limits.  He is tender over the lateral epicondyle, he does have pain with resisted wrist extension as well as resisted supination.      Pertinent New Results:        XRAY Report / Interpretation:   AP and lateral views of the right elbow taken today in the  office do not demonstrate any acute osseous abnormalities.  Visualized soft tissues appear normal.          Assessment:       1. Lateral epicondylitis of right elbow      Plan:     Lateral epicondylitis of right elbow  -     X-Ray Elbow 2 Views Right  -     Tendon Sheath  -     Ambulatory referral/consult to Physical/Occupational Therapy; Future; Expected date: 01/05/2023        Follow up in about 6 weeks (around 2/9/2023) for Re-check symptoms, PT/OT follow-up.    67-year-old male with right lateral epicondylitis, we injected the area over the extensor tendon origin with lidocaine and triamcinolone.  We talked about the use of a commercially available strap which she already has.  Will do physical therapy inject him back in 6 weeks see how he responds to the treatment.      YASHIRA Tejeda, PA-C    This note was created using [employer] voice recognition software that occasionally misinterprets words or phrases.

## 2023-01-04 ENCOUNTER — OFFICE VISIT (OUTPATIENT)
Dept: FAMILY MEDICINE | Facility: CLINIC | Age: 68
End: 2023-01-04
Payer: MEDICARE

## 2023-01-04 VITALS
OXYGEN SATURATION: 99 % | BODY MASS INDEX: 23.65 KG/M2 | WEIGHT: 174.63 LBS | SYSTOLIC BLOOD PRESSURE: 134 MMHG | TEMPERATURE: 98 F | HEART RATE: 66 BPM | DIASTOLIC BLOOD PRESSURE: 82 MMHG | HEIGHT: 72 IN

## 2023-01-04 DIAGNOSIS — G47.33 OSA ON CPAP: ICD-10-CM

## 2023-01-04 DIAGNOSIS — I47.10 SVT (SUPRAVENTRICULAR TACHYCARDIA): ICD-10-CM

## 2023-01-04 DIAGNOSIS — D49.4 BLADDER TUMOR: ICD-10-CM

## 2023-01-04 DIAGNOSIS — E72.12 METHYLENETETRAHYDROFOLATE REDUCTASE DEFICIENCY: ICD-10-CM

## 2023-01-04 DIAGNOSIS — Z15.89 MTHFR MUTATION: Primary | ICD-10-CM

## 2023-01-04 DIAGNOSIS — Z87.19 HISTORY OF ULCERATIVE COLITIS: ICD-10-CM

## 2023-01-04 DIAGNOSIS — I82.A11: ICD-10-CM

## 2023-01-04 DIAGNOSIS — Z12.5 SCREENING PSA (PROSTATE SPECIFIC ANTIGEN): ICD-10-CM

## 2023-01-04 DIAGNOSIS — K51.00 CHRONIC ULCERATIVE ENTEROCOLITIS WITHOUT COMPLICATION: ICD-10-CM

## 2023-01-04 DIAGNOSIS — Z90.49 HISTORY OF COLECTOMY: ICD-10-CM

## 2023-01-04 PROCEDURE — 1101F PT FALLS ASSESS-DOCD LE1/YR: CPT | Mod: CPTII,S$GLB,, | Performed by: FAMILY MEDICINE

## 2023-01-04 PROCEDURE — 99214 PR OFFICE/OUTPT VISIT, EST, LEVL IV, 30-39 MIN: ICD-10-PCS | Mod: S$GLB,,, | Performed by: FAMILY MEDICINE

## 2023-01-04 PROCEDURE — 1126F AMNT PAIN NOTED NONE PRSNT: CPT | Mod: CPTII,S$GLB,, | Performed by: FAMILY MEDICINE

## 2023-01-04 PROCEDURE — 1160F RVW MEDS BY RX/DR IN RCRD: CPT | Mod: CPTII,S$GLB,, | Performed by: FAMILY MEDICINE

## 2023-01-04 PROCEDURE — 99214 OFFICE O/P EST MOD 30 MIN: CPT | Mod: S$GLB,,, | Performed by: FAMILY MEDICINE

## 2023-01-04 PROCEDURE — 3288F FALL RISK ASSESSMENT DOCD: CPT | Mod: CPTII,S$GLB,, | Performed by: FAMILY MEDICINE

## 2023-01-04 PROCEDURE — 3075F SYST BP GE 130 - 139MM HG: CPT | Mod: CPTII,S$GLB,, | Performed by: FAMILY MEDICINE

## 2023-01-04 PROCEDURE — 3079F DIAST BP 80-89 MM HG: CPT | Mod: CPTII,S$GLB,, | Performed by: FAMILY MEDICINE

## 2023-01-04 PROCEDURE — 1101F PR PT FALLS ASSESS DOC 0-1 FALLS W/OUT INJ PAST YR: ICD-10-PCS | Mod: CPTII,S$GLB,, | Performed by: FAMILY MEDICINE

## 2023-01-04 PROCEDURE — 1160F PR REVIEW ALL MEDS BY PRESCRIBER/CLIN PHARMACIST DOCUMENTED: ICD-10-PCS | Mod: CPTII,S$GLB,, | Performed by: FAMILY MEDICINE

## 2023-01-04 PROCEDURE — 3079F PR MOST RECENT DIASTOLIC BLOOD PRESSURE 80-89 MM HG: ICD-10-PCS | Mod: CPTII,S$GLB,, | Performed by: FAMILY MEDICINE

## 2023-01-04 PROCEDURE — 3288F PR FALLS RISK ASSESSMENT DOCUMENTED: ICD-10-PCS | Mod: CPTII,S$GLB,, | Performed by: FAMILY MEDICINE

## 2023-01-04 PROCEDURE — 3075F PR MOST RECENT SYSTOLIC BLOOD PRESS GE 130-139MM HG: ICD-10-PCS | Mod: CPTII,S$GLB,, | Performed by: FAMILY MEDICINE

## 2023-01-04 PROCEDURE — 1159F PR MEDICATION LIST DOCUMENTED IN MEDICAL RECORD: ICD-10-PCS | Mod: CPTII,S$GLB,, | Performed by: FAMILY MEDICINE

## 2023-01-04 PROCEDURE — 1159F MED LIST DOCD IN RCRD: CPT | Mod: CPTII,S$GLB,, | Performed by: FAMILY MEDICINE

## 2023-01-04 PROCEDURE — 1126F PR PAIN SEVERITY QUANTIFIED, NO PAIN PRESENT: ICD-10-PCS | Mod: CPTII,S$GLB,, | Performed by: FAMILY MEDICINE

## 2023-01-04 PROCEDURE — 3008F PR BODY MASS INDEX (BMI) DOCUMENTED: ICD-10-PCS | Mod: CPTII,S$GLB,, | Performed by: FAMILY MEDICINE

## 2023-01-04 PROCEDURE — 3008F BODY MASS INDEX DOCD: CPT | Mod: CPTII,S$GLB,, | Performed by: FAMILY MEDICINE

## 2023-01-04 NOTE — PROGRESS NOTES
Subjective:       Patient ID: Faisal Quiros is a 67 y.o. male.    Chief Complaint: Follow-up    Patient presents to clinic for follow up. AR. COPD on CPAP. No further syncope episodes. Bladder cancer. TURP procedure 7/2022 with Dr. Teixeira. Repeat cystoscopy this month. BPH. PSA up to date. No trouble urinating. Discontinued Flomax. MTHFR. Taking OTC B12, B6, and folate acid in place of Folbic acid. Unsure of the dosage of vitamins. Homocysteine 7.8. Current follow up with Dr. Blackman, he is monitoring MCV level per patient. Cardiovascular ok. No SVT episodes. TDAP in 2017. He sent a paper copy to be scanned into chart.   Review of Systems   Respiratory: Negative.     Cardiovascular: Negative.  Negative for chest pain and palpitations.   Allergic/Immunologic: Positive for environmental allergies.   Hematological: Negative.    Psychiatric/Behavioral: Negative.         Objective:      Physical Exam  Constitutional:       Appearance: Normal appearance.   Neck:      Vascular: No carotid bruit.   Cardiovascular:      Rate and Rhythm: Normal rate and regular rhythm.      Pulses: Normal pulses.      Heart sounds: Normal heart sounds.   Pulmonary:      Effort: Pulmonary effort is normal.      Breath sounds: Normal breath sounds.   Lymphadenopathy:      Cervical: No cervical adenopathy.   Skin:     General: Skin is warm and dry.   Neurological:      Mental Status: He is alert.   Psychiatric:         Mood and Affect: Mood normal.         Behavior: Behavior normal.       Assessment/Plan:     MTHFR mutation    Chronic ulcerative enterocolitis without complication    Screening PSA (prostate specific antigen)  -     PSA, Screening; Future; Expected date: 01/04/2023    SEAN on CPAP    Bladder tumor    SVT (supraventricular tachycardia)    BMI 23.0-23.9, adult    History of ulcerative colitis    History of colectomy    Hx/of venous embolism and thrombosis of axillary veins, right    Methylenetetrahydrofolate reductase  deficiency       Continue his folic acid 1 mg per day.  Check a homocystine level at next follow-up.  Prevnar 20 will be due in March.  Document his TD AP vaccine done March 31, 2017.  Prostate cancer screening is due.  He wishes to continue doing these.  PSA ordered.  Continue his CPAP.  It has been replaced.  Follow-up with urology regularly about the bladder cancer.

## 2023-01-05 LAB — PSA SERPL-MCNC: 2.9 NG/ML (ref 0–4)

## 2023-01-09 ENCOUNTER — PATIENT MESSAGE (OUTPATIENT)
Dept: FAMILY MEDICINE | Facility: CLINIC | Age: 68
End: 2023-01-09
Payer: MEDICARE

## 2023-01-23 ENCOUNTER — TELEPHONE (OUTPATIENT)
Dept: FAMILY MEDICINE | Facility: CLINIC | Age: 68
End: 2023-01-23
Payer: MEDICARE

## 2023-02-02 ENCOUNTER — PATIENT MESSAGE (OUTPATIENT)
Dept: FAMILY MEDICINE | Facility: CLINIC | Age: 68
End: 2023-02-02
Payer: MEDICARE

## 2023-02-08 ENCOUNTER — OFFICE VISIT (OUTPATIENT)
Dept: ORTHOPEDICS | Facility: CLINIC | Age: 68
End: 2023-02-08
Payer: MEDICARE

## 2023-02-08 VITALS
HEIGHT: 72 IN | SYSTOLIC BLOOD PRESSURE: 120 MMHG | BODY MASS INDEX: 23.57 KG/M2 | WEIGHT: 174 LBS | DIASTOLIC BLOOD PRESSURE: 72 MMHG

## 2023-02-08 DIAGNOSIS — M77.11 LATERAL EPICONDYLITIS OF RIGHT ELBOW: Primary | ICD-10-CM

## 2023-02-08 PROCEDURE — 3008F PR BODY MASS INDEX (BMI) DOCUMENTED: ICD-10-PCS | Mod: CPTII,S$GLB,, | Performed by: PHYSICIAN ASSISTANT

## 2023-02-08 PROCEDURE — 3074F PR MOST RECENT SYSTOLIC BLOOD PRESSURE < 130 MM HG: ICD-10-PCS | Mod: CPTII,S$GLB,, | Performed by: PHYSICIAN ASSISTANT

## 2023-02-08 PROCEDURE — 3078F PR MOST RECENT DIASTOLIC BLOOD PRESSURE < 80 MM HG: ICD-10-PCS | Mod: CPTII,S$GLB,, | Performed by: PHYSICIAN ASSISTANT

## 2023-02-08 PROCEDURE — 3078F DIAST BP <80 MM HG: CPT | Mod: CPTII,S$GLB,, | Performed by: PHYSICIAN ASSISTANT

## 2023-02-08 PROCEDURE — 3288F PR FALLS RISK ASSESSMENT DOCUMENTED: ICD-10-PCS | Mod: CPTII,S$GLB,, | Performed by: PHYSICIAN ASSISTANT

## 2023-02-08 PROCEDURE — 1126F AMNT PAIN NOTED NONE PRSNT: CPT | Mod: CPTII,S$GLB,, | Performed by: PHYSICIAN ASSISTANT

## 2023-02-08 PROCEDURE — 1126F PR PAIN SEVERITY QUANTIFIED, NO PAIN PRESENT: ICD-10-PCS | Mod: CPTII,S$GLB,, | Performed by: PHYSICIAN ASSISTANT

## 2023-02-08 PROCEDURE — 3008F BODY MASS INDEX DOCD: CPT | Mod: CPTII,S$GLB,, | Performed by: PHYSICIAN ASSISTANT

## 2023-02-08 PROCEDURE — 1101F PR PT FALLS ASSESS DOC 0-1 FALLS W/OUT INJ PAST YR: ICD-10-PCS | Mod: CPTII,S$GLB,, | Performed by: PHYSICIAN ASSISTANT

## 2023-02-08 PROCEDURE — 3288F FALL RISK ASSESSMENT DOCD: CPT | Mod: CPTII,S$GLB,, | Performed by: PHYSICIAN ASSISTANT

## 2023-02-08 PROCEDURE — 3074F SYST BP LT 130 MM HG: CPT | Mod: CPTII,S$GLB,, | Performed by: PHYSICIAN ASSISTANT

## 2023-02-08 PROCEDURE — 99213 OFFICE O/P EST LOW 20 MIN: CPT | Mod: S$GLB,,, | Performed by: PHYSICIAN ASSISTANT

## 2023-02-08 PROCEDURE — 1101F PT FALLS ASSESS-DOCD LE1/YR: CPT | Mod: CPTII,S$GLB,, | Performed by: PHYSICIAN ASSISTANT

## 2023-02-08 PROCEDURE — 99213 PR OFFICE/OUTPT VISIT, EST, LEVL III, 20-29 MIN: ICD-10-PCS | Mod: S$GLB,,, | Performed by: PHYSICIAN ASSISTANT

## 2023-02-08 NOTE — PROGRESS NOTES
[facility]THOPED[facility]  [address]  [place] [address]  Phone: [phone]   [phone]    Patient's PCP: Milan Chen III, MD  Referring Provider: No ref. provider found    Subjective:      Chief Complaint:   Chief Complaint   Patient presents with    Right Elbow - Pain     RT elbow injection 12/29/22, PT helped symptoms. Overall he is doing better       Past Medical History:   Diagnosis Date    Allergic rhinitis     Cancer     skin    DVT (deep vein thrombosis) in pregnancy 2021    right axillary    Hx of seasonal allergies 2000    Hx/of venous embolism and thrombosis of axillary veins, right 10/11/2021    Legionella pneumonia     Lupus anticoagulant positive 12/28/2021 4/6/22 pt states negative    Right shoulder pain 2018    Seborrhea-like dermatitis with psoriasiform elements     Sleep apnea 2017    uses cpap nightly    SVT (supraventricular tachycardia) 2017    Transfusion reaction     hep c-- 1980's & treated       Past Surgical History:   Procedure Laterality Date    APPENDECTOMY      COLON SURGERY      CYSTOSCOPY N/A 7/7/2022    Procedure: CYSTOSCOPY;  Surgeon: Garland Teixeira MD;  Location: [facility] OR;  Service: Urology;  Laterality: N/A;    FLEXIBLE SIGMOIDOSCOPY N/A 4/8/2022    Procedure: SIGMOIDOSCOPY, FLEXIBLE;  Surgeon: Michael Avalos MD;  Location: [facility] ENDO;  Service: Endoscopy;  Laterality: N/A;    LUMBAR DISC SURGERY  2007    pet scan      right shoulder surgery  2004 and 2019    x2     SHOULDER ARTHROSCOPY Right 8/28/2019    Procedure: ARTHROSCOPY, SHOULDER;  Surgeon: Stone Suarez MD;  Location: [facility] OR;  Service: Orthopedics;  Laterality: Right;  ARTHREX NOTIFIED    total proctocolectomy  1985    WISDOM TOOTH EXTRACTION         Current Outpatient Medications   Medication Sig    ascorbic acid, vitamin C, (VITAMIN C) 1000 MG tablet     calcium-vitamin D 600 mg-10 mcg (400 unit) Tab     cartilage/collagen II/hyaluron (MOVE FREE ULTRA ORAL)      krill-om-3-dha-epa-phospho-ast (KRILL OIL) 840-815-92-75 mg Cap     levocetirizine (XYZAL) 5 MG tablet TAKE 1 TABLET BY MOUTH TWICE A DAY    multivitamin-minerals-lutein Tab     pyridoxine, vitamin B6, (B-6) 100 MG Tab     cyanocobalamin/cobamamide (B12 SL)     ferrous fumarate/vit Bcomp,C (SUPER B COMPLEX ORAL)      Current Facility-Administered Medications   Medication    Allergy Mix       Review of patient's allergies indicates:  No Known Allergies    Family History   Problem Relation Age of Onset    Hypertension Mother     Allergic rhinitis Neg Hx     Allergies Neg Hx     Angioedema Neg Hx     Asthma Neg Hx     Eczema Neg Hx     Atopy Neg Hx     Immunodeficiency Neg Hx     Rhinitis Neg Hx     Urticaria Neg Hx        Social History     Socioeconomic History    Marital status:    Tobacco Use    Smoking status: Never    Smokeless tobacco: Never   Substance and Sexual Activity    Alcohol use: No    Drug use: No       Prior to meeting with the patient I reviewed the medical chart in theDrop. This included reviewing the previous progress notes from our office, review of the patient's last appointment with their primary care provider, review of any visits to the emergency room, and review of any pain management appointments or procedures.    History of present illness:  67-year-old male who I saw on December 29th.  He was having an acute exacerbation of his right tennis elbow.  Before seeing him, symptoms have been going on for about a month.  He had been doing conservative measures and this has drastically improved his symptoms.  We injected his right elbow over the lateral epicondyle and ordered some physical therapy as well as continue his conservative measures.  He returns today for follow-up evaluation.      Review of Systems:    Constitutional: Negative for chills, fever and weight loss.   HENT: Negative for congestion.    Eyes: Negative for discharge and redness.   Respiratory: Negative for cough and  shortness of breath.    Cardiovascular: Negative for chest pain.   Gastrointestinal: Negative for nausea and vomiting.   Musculoskeletal: See HPI.   Skin: Negative for rash.   Neurological: Negative for headaches.   Endo/Heme/Allergies: Does not bruise/bleed easily.   Psychiatric/Behavioral: The patient is not nervous/anxious.    All other systems reviewed and are negative.       Objective:      Physical Examination:    Vital Signs:    Vitals:    02/08/23 0909   BP: 120/72       Body mass index is 23.6 kg/m².    This a well-developed, well nourished patient in no acute distress.  They are alert and oriented and cooperative to examination.     Examination of the right elbow, the skin is dry and intact, no erythema ecchymosis, no signs symptoms of infection.  Range of motion is within normal limits.       Pertinent New Results:        XRAY Report / Interpretation:             Assessment:       1. Lateral epicondylitis of right elbow      Plan:     Lateral epicondylitis of right elbow        Follow up if symptoms worsen or fail to improve.    67-year-old male with an acute exacerbation of his lateral epicondylitis at his last visit.  We gave him an injection and sent him to physical therapy he was very pleased and happy with physical therapy, he feels like he is even better than he was before he developed the tennis elbow symptoms in terms of range of motion and use of his right arm/elbow.  Continue with his home exercise program, use of conservative measures we talked about lifting with the palm up as opposed to palm down.  He will follow up with us as needed.      YASHIRA Tejeda, PAJoshC    This note was created using [employer] voice recognition software that occasionally misinterprets words or phrases.

## 2023-03-15 ENCOUNTER — PATIENT MESSAGE (OUTPATIENT)
Dept: HEMATOLOGY/ONCOLOGY | Facility: CLINIC | Age: 68
End: 2023-03-15

## 2023-03-15 ENCOUNTER — PATIENT MESSAGE (OUTPATIENT)
Dept: FAMILY MEDICINE | Facility: CLINIC | Age: 68
End: 2023-03-15
Payer: MEDICARE

## 2023-04-03 ENCOUNTER — PATIENT MESSAGE (OUTPATIENT)
Dept: FAMILY MEDICINE | Facility: CLINIC | Age: 68
End: 2023-04-03
Payer: MEDICARE

## 2023-05-19 ENCOUNTER — PATIENT MESSAGE (OUTPATIENT)
Dept: FAMILY MEDICINE | Facility: CLINIC | Age: 68
End: 2023-05-19
Payer: MEDICARE

## 2023-05-19 DIAGNOSIS — E78.5 HYPERLIPIDEMIA, UNSPECIFIED HYPERLIPIDEMIA TYPE: Primary | ICD-10-CM

## 2023-05-22 ENCOUNTER — PATIENT MESSAGE (OUTPATIENT)
Dept: FAMILY MEDICINE | Facility: CLINIC | Age: 68
End: 2023-05-22
Payer: MEDICARE

## 2023-05-23 ENCOUNTER — PATIENT MESSAGE (OUTPATIENT)
Dept: RESEARCH | Facility: HOSPITAL | Age: 68
End: 2023-05-23
Payer: MEDICARE

## 2023-05-23 ENCOUNTER — TELEPHONE (OUTPATIENT)
Dept: HEMATOLOGY/ONCOLOGY | Facility: CLINIC | Age: 68
End: 2023-05-23

## 2023-05-23 NOTE — TELEPHONE ENCOUNTER
Contact[facility] pt to [facility] his f/u appt on 5/23/23  Per pt he will get labs Tuesday morning

## 2023-05-28 ENCOUNTER — PATIENT MESSAGE (OUTPATIENT)
Dept: HEMATOLOGY/ONCOLOGY | Facility: CLINIC | Age: 68
End: 2023-05-28

## 2023-05-28 ENCOUNTER — PATIENT MESSAGE (OUTPATIENT)
Dept: FAMILY MEDICINE | Facility: CLINIC | Age: 68
End: 2023-05-28
Payer: MEDICARE

## 2023-05-28 LAB
CHOLEST SERPL-MCNC: 172 MG/DL (ref 100–199)
HDLC SERPL-MCNC: 65 MG/DL
LDLC SERPL CALC-MCNC: 90 MG/DL (ref 0–99)
TRIGL SERPL-MCNC: 91 MG/DL (ref 0–149)
VLDLC SERPL CALC-MCNC: 17 MG/DL (ref 5–40)

## 2023-05-29 ENCOUNTER — OFFICE VISIT (OUTPATIENT)
Dept: ORTHOPEDICS | Facility: CLINIC | Age: 68
End: 2023-05-29
Payer: MEDICARE

## 2023-05-29 ENCOUNTER — TELEPHONE (OUTPATIENT)
Dept: HEMATOLOGY/ONCOLOGY | Facility: CLINIC | Age: 68
End: 2023-05-29

## 2023-05-29 VITALS — WEIGHT: 178 LBS | BODY MASS INDEX: 24.11 KG/M2 | HEIGHT: 72 IN

## 2023-05-29 DIAGNOSIS — M47.816 FACET ARTHRITIS OF LUMBAR REGION: ICD-10-CM

## 2023-05-29 DIAGNOSIS — Z98.890 HISTORY OF LUMBAR SURGERY: ICD-10-CM

## 2023-05-29 DIAGNOSIS — M51.36 DISC DEGENERATION, LUMBAR: Primary | ICD-10-CM

## 2023-05-29 PROCEDURE — 1160F PR REVIEW ALL MEDS BY PRESCRIBER/CLIN PHARMACIST DOCUMENTED: ICD-10-PCS | Mod: CPTII,S$GLB,, | Performed by: ORTHOPAEDIC SURGERY

## 2023-05-29 PROCEDURE — 1125F PR PAIN SEVERITY QUANTIFIED, PAIN PRESENT: ICD-10-PCS | Mod: CPTII,S$GLB,, | Performed by: ORTHOPAEDIC SURGERY

## 2023-05-29 PROCEDURE — 1160F RVW MEDS BY RX/DR IN RCRD: CPT | Mod: CPTII,S$GLB,, | Performed by: ORTHOPAEDIC SURGERY

## 2023-05-29 PROCEDURE — 3008F PR BODY MASS INDEX (BMI) DOCUMENTED: ICD-10-PCS | Mod: CPTII,S$GLB,, | Performed by: ORTHOPAEDIC SURGERY

## 2023-05-29 PROCEDURE — 99213 PR OFFICE/OUTPT VISIT, EST, LEVL III, 20-29 MIN: ICD-10-PCS | Mod: S$GLB,,, | Performed by: ORTHOPAEDIC SURGERY

## 2023-05-29 PROCEDURE — 1159F PR MEDICATION LIST DOCUMENTED IN MEDICAL RECORD: ICD-10-PCS | Mod: CPTII,S$GLB,, | Performed by: ORTHOPAEDIC SURGERY

## 2023-05-29 PROCEDURE — 99213 OFFICE O/P EST LOW 20 MIN: CPT | Mod: S$GLB,,, | Performed by: ORTHOPAEDIC SURGERY

## 2023-05-29 PROCEDURE — 1159F MED LIST DOCD IN RCRD: CPT | Mod: CPTII,S$GLB,, | Performed by: ORTHOPAEDIC SURGERY

## 2023-05-29 PROCEDURE — 3288F PR FALLS RISK ASSESSMENT DOCUMENTED: ICD-10-PCS | Mod: CPTII,S$GLB,, | Performed by: ORTHOPAEDIC SURGERY

## 2023-05-29 PROCEDURE — 1101F PR PT FALLS ASSESS DOC 0-1 FALLS W/OUT INJ PAST YR: ICD-10-PCS | Mod: CPTII,S$GLB,, | Performed by: ORTHOPAEDIC SURGERY

## 2023-05-29 PROCEDURE — 3288F FALL RISK ASSESSMENT DOCD: CPT | Mod: CPTII,S$GLB,, | Performed by: ORTHOPAEDIC SURGERY

## 2023-05-29 PROCEDURE — 1101F PT FALLS ASSESS-DOCD LE1/YR: CPT | Mod: CPTII,S$GLB,, | Performed by: ORTHOPAEDIC SURGERY

## 2023-05-29 PROCEDURE — 3008F BODY MASS INDEX DOCD: CPT | Mod: CPTII,S$GLB,, | Performed by: ORTHOPAEDIC SURGERY

## 2023-05-29 PROCEDURE — 1125F AMNT PAIN NOTED PAIN PRSNT: CPT | Mod: CPTII,S$GLB,, | Performed by: ORTHOPAEDIC SURGERY

## 2023-05-29 RX ORDER — VITAMIN B COMPLEX
TABLET ORAL
COMMUNITY
Start: 2022-11-17

## 2023-05-29 RX ORDER — B-COMPLEX WITH VITAMIN C
TABLET ORAL
COMMUNITY
Start: 2023-01-01

## 2023-05-29 NOTE — PROGRESS NOTES
Subjective:       Patient ID: Faisal Quiros is a 67 y.o. male.    Chief Complaint: Pain of the Lumbar Spine (Says the lumbar pain is under control, pain is right inner thigh and into groin, numbness to inner thigh and groing as well, )      History of Present Illness    Prior to meeting with the patient I reviewed the medical chart in [facility]. This included reviewing the previous progress notes from our office, review of the patient's last appointment with their primary care provider, review of any visits to the emergency room, and review of any pain management appointments or procedures.   Patient was seen for acute back pain radiating into the right groin he was sent for an MRI for further evaluation history of laminectomy performed in 207 for left-sided radicular symptoms.  Does have some episodic back pain    Current Medications  Current Outpatient Medications   Medication Sig Dispense Refill    ascorbic acid, vitamin C, (VITAMIN C) 1000 MG tablet       B-complex with vitamin C (Z-BEC OR EQUIV) tablet       calcium-vitamin D 600 mg-10 mcg (400 unit) Tab       cartilage/collagen II/hyaluron (MOVE FREE ULTRA ORAL)       cyanocobalamin, vitamin B-12, (VITAMIN B-12) 2,500 mcg Subl       krill-om-3-dha-epa-phospho-ast (KRILL OIL) 224-725-79-75 mg Cap       levocetirizine (XYZAL) 5 MG tablet TAKE 1 TABLET BY MOUTH TWICE A DAY 60 tablet 3    multivitamin-minerals-lutein Tab       pyridoxine, vitamin B6, (B-6) 100 MG Tab        Current Facility-Administered Medications   Medication Dose Route Frequency Provider Last Rate Last Admin    Allergy Mix   Subcutaneous Q30 Days Karen Parker MD   Given at 07/13/22 0821       Allergies  Review of patient's allergies indicates:  No Known Allergies    Past Medical History  Past Medical History:   Diagnosis Date    Allergic rhinitis     Cancer     skin    DVT (deep vein thrombosis) in pregnancy 2021    right axillary    Hx of seasonal allergies 2000    Hx/of venous embolism and  thrombosis of axillary veins, right 10/11/2021    Legionella pneumonia     Lupus anticoagulant positive 12/28/2021 4/6/22 pt states negative    Right shoulder pain 2018    Seborrhea-like dermatitis with psoriasiform elements     Sleep apnea 2017    uses cpap nightly    SVT (supraventricular tachycardia) 2017    Transfusion reaction     hep c-- 1980's & treated       Surgical History  Past Surgical History:   Procedure Laterality Date    APPENDECTOMY      COLON SURGERY      CYSTOSCOPY N/A 7/7/2022    Procedure: CYSTOSCOPY;  Surgeon: Garland Teixeira MD;  Location: [facility] OR;  Service: Urology;  Laterality: N/A;    FLEXIBLE SIGMOIDOSCOPY N/A 4/8/2022    Procedure: SIGMOIDOSCOPY, FLEXIBLE;  Surgeon: Michael Avalos MD;  Location: [facility] ENDO;  Service: Endoscopy;  Laterality: N/A;    LUMBAR DISC SURGERY  2007    pet scan      right shoulder surgery  2004 and 2019    x2     SHOULDER ARTHROSCOPY Right 8/28/2019    Procedure: ARTHROSCOPY, SHOULDER;  Surgeon: Stone Suarez MD;  Location: [facility] OR;  Service: Orthopedics;  Laterality: Right;  ARTHREX NOTIFIED    total proctocolectomy  1985    WISDOM TOOTH EXTRACTION         Family History:   Family History   Problem Relation Age of Onset    Hypertension Mother     Allergic rhinitis Neg Hx     Allergies Neg Hx     Angioedema Neg Hx     Asthma Neg Hx     Eczema Neg Hx     Atopy Neg Hx     Immunodeficiency Neg Hx     Rhinitis Neg Hx     Urticaria Neg Hx        Social History:   Social History     Socioeconomic History    Marital status:    Tobacco Use    Smoking status: Never    Smokeless tobacco: Never   Substance and Sexual Activity    Alcohol use: No    Drug use: No       Hospitalization/Major Diagnostic Procedure:     Review of Systems     General/Constitutional:  Chills denies. Fatigue denies. Fever denies. Weight gain denies. Weight loss denies.    Respiratory:  Shortness of breath denies.    Cardiovascular:  Chest pain denies.    Gastrointestinal:   Constipation denies. Diarrhea denies. Nausea denies. Vomiting denies.     Hematology:  Easy bruising denies. Prolonged bleeding denies.     Genitourinary:  Frequent urination denies. Pain in lower back denies. Painful urination denies.     Musculoskeletal:  See HPI for details    Skin:  Rash denies.    Neurologic:  Dizziness denies. Gait abnormalities denies. Seizures denies. Tingling/Numbess denies.    Psychiatric:  Anxiety denies. Depressed mood denies.     Objective:   Vital Signs: There were no vitals filed for this visit.     Physical Exam      General Examination:     Constitutional: The patient is alert and oriented to lace person and time. Mood is pleasant.     Head/Face: Normal facial features normal eyebrows    Eyes: Normal extraocular motion bilaterally    Lungs: Respirations are equal and unlabored    Gait is coordinated.    Cardiovascular: There are no swelling or varicosities present.    Lymphatic: Negative for adenopathy    Skin: Normal    Neurological: Level of consciousness normal. Oriented to place person and time and situation    Psychiatric: Oriented to time place person and situation    Well-healed lumbar incision moderate pain with extension and bending no spasm straight-leg-raising negative hip and knee range of motion intact.    XRAY Report/ Interpretation :  AP and lateral x-rays of lumbar spine will performed today. Indications low back pain. Findings:  Marked disc space narrowing L5-S1 level multilevel facet joint arthritis      Assessment:       1. Disc degeneration, lumbar    2. Facet arthritis of lumbar region    3. History of lumbar surgery        Plan:       Faisal was seen today for pain.    Diagnoses and all orders for this visit:    Disc degeneration, lumbar  -     X-Ray Lumbar Spine Ap And Lateral  -     X-Ray Pelvis Routine AP    Facet arthritis of lumbar region    History of lumbar surgery         No follow-ups on file.    Patient has probably referred pain from facet joint  disease into his groin which is improving I see no pathology in his hip.  He is getting better we given him a home exercise program should his symptoms not improve or resolve we will consider an MRI and referral to pain management at this time he would like to be treated conservatively  Treatment options were discussed with regards to the nature of the medical condition. Conservative pain intervention and surgical options were discussed in detail. The probability of success of each separate treatment option was discussed. The patient expressed a clear understanding of the treatment options. With regards to surgery, the procedure risk, benefits, complications, and outcomes were discussed. No guarantees were given with regards to surgical outcome.   The risk of complications, morbidity, and mortality of patient management decisions have been made at the time of this visit. These are associated with the patient's problems, diagnostic procedures and treatment options. This includes the possible management options selected and those considered but not selected by the patient after shared medical decision making we discussed with the patient.   This note was created using Dragon voice recognition software that occasionally misinterpreted phrases or words.

## 2023-05-31 NOTE — PROGRESS NOTES
"[facility]  PROGRESS NOTE -  Follow-up Visit      Subjective:       Patient ID:   NAME: Faisal Quiros : 1955     67 y.o. male    Referring Doc: Milan Chen III, *  Other Physicians: Keith Duenas Albright; Goyo Lui           Chief Complaint: Rght axillary vein clot f/u     History of Present Illness:     Patient returns today for a regularly scheduled follow-up visit.  The patient is here today to go over the results of the recently ordered labs, tests and studies. He is here by himself.     He is feeling "good". Breathing ok. No excessive bleeding or bruising.    Had some recent back issues and has been seeing Dr Eliezer Albert    He recent repeat scope with Dr Teixeira; he had increase in his PSA and ended up having MRI of the prostate; he previously had a low grade bladder cancer in 2022 . Possible prostatitis and treated with cipro po abx    He previously had seen Dr Avalos on 2022 and had scope which was clear. He recently reached out to Dr Duenas      He saw Dr Chen in 2023    Discussed covid 19 precautions - s/p vaccinations          ROS:   GEN: normal without any fever, night sweats or weight loss  HEENT: normal with no HA's, sore throat, stiff neck, changes in vision  CV: normal with no CP, SOB, PND, MAK or orthopnea  PULM: normal with no SOB, cough, hemoptysis, sputum or pleuritic pain  GI: normal with no abdominal pain, nausea, vomiting, constipation, diarrhea, melanotic stools, BRBPR, or hematemesis  : normal with no hematuria, dysuria  BREAST: normal with no mass, discharge, pain  SKIN: normal with no rash, erythema, bruising, or swelling    Pain Scale:  0    Allergies:  Review of patient's allergies indicates:  No Known Allergies    Medications:    Current Outpatient Medications:     ascorbic acid, vitamin C, (VITAMIN C) 1000 MG tablet, , Disp: , Rfl:     B-complex with vitamin C (Z-BEC OR EQUIV) tablet, , Disp: , Rfl:     " calcium-vitamin D 600 mg-10 mcg (400 unit) Tab, , Disp: , Rfl:     cartilage/collagen II/hyaluron (MOVE FREE ULTRA ORAL), , Disp: , Rfl:     cyanocobalamin, vitamin B-12, (VITAMIN B-12) 2,500 mcg Subl, , Disp: , Rfl:     krill-om-3-dha-epa-phospho-ast (KRILL OIL) 107-109-30-75 mg Cap, , Disp: , Rfl:     levocetirizine (XYZAL) 5 MG tablet, TAKE 1 TABLET BY MOUTH TWICE A DAY, Disp: 60 tablet, Rfl: 3    multivitamin-minerals-lutein Tab, , Disp: , Rfl:     pyridoxine, vitamin B6, (B-6) 100 MG Tab, , Disp: , Rfl:     Current Facility-Administered Medications:     Allergy Mix, , Subcutaneous, Q30 Days, Karen Parker MD, Given at 07/13/22 0821    PMHx/PSHx Updates:  See patient's last visit with me on 11/17/2022.  See H&P on 11/24/2021        Pathology:   Cancer Staging   No matching staging information was found for the patient.    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   - NO STROMAL INVASION IS PRESENT.   - MUSCULARIS PROPRIA (DETRUSOR MUSCLE) IS NOT REPRESENTED.         Objective:     Vitals:  Blood pressure (!) 146/87, pulse 69, temperature 97.9 °F (36.6 °C), resp. rate 18, height 6' (1.829 m), weight 81.8 kg (180 lb 4.8 oz).    Physical Examination:   GEN: no apparent distress, comfortable; AAOx3  HEAD: atraumatic and normocephalic  EYES: no pallor, no icterus, PERRLA  ENT: OMM, no pharyngeal erythema, external ears WNL; no nasal discharge; no thrush  NECK: no masses, thyroid normal, trachea midline, no LAD/LN's, supple  CV: RRR with no murmur; normal pulse; normal S1 and S2; no pedal edema  CHEST: Normal respiratory effort; CTAB; normal breath sounds; no wheeze or crackles  ABDOM: nontender and nondistended; soft; normal bowel sounds; no rebound/guarding  MUSC/Skeletal: ROM normal; no crepitus; joints normal; no deformities or arthropathy  EXTREM: no clubbing, cyanosis, inflammation or swelling  SKIN: no rashes,  lesions, ulcers, petechiae or subcutaneous nodules  : no casarez  NEURO: grossly intact; motor/sensory WNL; AAOx3; no tremors  PSYCH: normal mood, affect and behavior  LYMPH: normal cervical, supraclavicular, axillary and groin LN's            Labs:     Lab Results   Component Value Date    WBC 5.1 05/27/2023    HGB 15.2 05/27/2023    HCT 45.7 05/27/2023     (H) 05/27/2023     05/27/2023       CMP  Sodium   Date Value Ref Range Status   05/27/2023 142 134 - 144 mmol/L Final   10/22/2018 142 134 - 144 mmol/L      Potassium   Date Value Ref Range Status   05/27/2023 3.9 3.5 - 5.2 mmol/L Final     Chloride   Date Value Ref Range Status   05/27/2023 104 96 - 106 mmol/L Final   10/22/2018 107 98 - 110 mmol/L      CO2   Date Value Ref Range Status   05/27/2023 23 20 - 29 mmol/L Final     Glucose   Date Value Ref Range Status   05/27/2023 89 70 - 99 mg/dL Final   10/22/2018 100 (H) 70 - 99 mg/dL      BUN   Date Value Ref Range Status   05/27/2023 15 8 - 27 mg/dL Final     Creatinine   Date Value Ref Range Status   05/27/2023 0.82 0.76 - 1.27 mg/dL Final   10/22/2018 0.75 0.60 - 1.40 mg/dL      Calcium   Date Value Ref Range Status   05/27/2023 9.3 8.6 - 10.2 mg/dL Final     Total Protein   Date Value Ref Range Status   06/22/2022 6.4 6.0 - 8.4 g/dL Final     Albumin   Date Value Ref Range Status   05/27/2023 4.4 3.8 - 4.8 g/dL Final   06/22/2022 3.4 (L) 3.5 - 5.2 g/dL Final   10/22/2018 4.0 3.1 - 4.7 g/dL      Total Bilirubin   Date Value Ref Range Status   05/27/2023 1.1 0.0 - 1.2 mg/dL Final     Alkaline Phosphatase   Date Value Ref Range Status   06/22/2022 61 55 - 135 U/L Final     AST   Date Value Ref Range Status   05/27/2023 30 0 - 40 IU/L Final     ALT   Date Value Ref Range Status   05/27/2023 23 0 - 44 IU/L Final     Anion Gap   Date Value Ref Range Status   06/22/2022 8 8 - 16 mmol/L Final     eGFR if    Date Value Ref Range Status   06/22/2022 >60.0 >60 mL/min/1.73 m^2 Final      eGFR if non    Date Value Ref Range Status   06/22/2022 >60.0 >60 mL/min/1.73 m^2 Final     Comment:     Calculation used to obtain the estimated glomerular filtration  rate (eGFR) is the CKD-EPI equation.            Homocyst(e)ine 0.0 - 17.2 umol/L 6.7                 Radiology/Diagnostic Studies:    CT Chest With Contrast    Result Date: 11/30/2021  CMS MANDATED QUALITY DATA - CT RADIATION 436 All CT scans at this facility utilize dose modulation, iterative reconstruction, and/or weight based dosing when appropriate to reduce radiation dose to as low as reasonably achievable. CLINICAL HISTORY: 66 years (1955) Male clot TECHNIQUE: CT CHEST WITH IV CONTRAST. 342 images obtained. Axial CT images of the chest were obtained after the uneventful administration of IV contrast. Soft tissue and lung windows were sent for review. Sagittal and coronal reformats were performed by the radiologist. CONTRAST: 100 mL of IV Omni 350 was administered uneventfully by IV COMPARISON: Ultrasound from 8/10/2021 FINDINGS:. Visualized neck: Within normal limits. Lungs: The lungs are essentially clear with no concerning pulmonary nodule or mass. There is a 6 mm calcified granuloma in the central left upper lobe (image 64). Airway: The trachea and central bronchial tree appear normal. Pleura: There is no pleural effusion. There is no pneumothorax. Cardiovascular: The heart is normal in size. There is no pericardial effusion. The thoracic aorta and great vessels are normal in course and caliber. Note that the contrast bolus timing is not targeted for evaluation of pulmonary embolus or deep venous thrombosis in the upper extremities. Mediastinum: There is no supraclavicular, axillary, mediastinal, or hilar lymphadenopathy. Soft tissues: The peripheral soft tissues appear normal. Musculoskeletal: There are moderate degenerative changes of the thoracic spine.  There are no suspicious osseous lesions. Esophagus: The  esophagus appears grossly normal. Upper Abdomen: No acute abnormality is seen in the upper abdomen. Relative diffuse low-attenuation liver in keeping with steatosis. Rounded low-attenuation structures are seen in the liver, incompletely assessed on this nontargeted exam, but suggestive of cysts. Partially imaged is a 5.5 cm simple fluid attenuation structure in the left kidney favored to represent a cyst. IMPRESSION: 1. No acute cardiac or pulmonary process. 2. No concerning pulmonary nodule, mass or lymphadenopathy to suggest malignancy. 3. Additional, and incidental findings as outlined above. . Electronically signed by:  Manfred Silva MD  2021 10:37 AM [facility] Workstation: [id number]      CT Abdom/pelvis 10/22/2021:     IMPRESSION:     1.  Patchy haziness of the central mesenteric fat with multiple shoddy lymph nodes a similar appearance compared with exam from 2017. 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  2.  Postsurgical changes of colectomy noted.  3.  Multiple hepatic and renal cysts.  4.  Mild prostatomegaly.     US Abdom  10/19/2021:     IMPRESSION:     1.  Cholelithiasis.  2.  Mild bilateral caliectasis versus hydronephrosis.  3.  Bilateral simple renal cysts.  4.  Septated hepatic cyst in the right liver lobe.        US Upper extremity:  10/8/2021     IMPRESSION:  Deep venous thrombosis in the right upper extremity at the level of the axillary vein       I have reviewed all available lab results and radiology reports.    Assessment/Plan:   (1) 67 y.o. male with diagnosis of right axillary vein clot who has been referred by Milan Chen III, * for evaluation by medical hematology/oncology.     -He is on xarelto 20mg po daily. He developed a chord under the right arm/axilla and noticed it while taking a shower. He reports that he did not have any pain at first. He saw his PCP and had US which showed a clot.  -  No prior personal or family history of clots.   - His dad  of stroke but  he was of advanced age.   - no excessive bleeding or bruising     12/29/2021:  - MTHFR-C heterozygous positive with normal homocysteine  - LA positive but he is on xarelto  - discussed the genetic implications of the MTHFR  - arm swelling resolved  - discontinue xarelto  - repeat LA in 2-3 months  - folbic and baby aspirin on full stomach    5/17/2022:  - repeat LA was negative  - now off xarelto since Dec 2021  - continue on the folbic or folbic-like supplements    11/17/2022:  - no recent swelling or bruising/bleeding  - repeat homocystiene is good  - continue on his vitamin supplements  - check B12/folate with next labs due to mild macrocytosis without any current anemia    6/1/2023:  - no excessive bleeding or bruising  - homocystine low at 6.7  - on MVI        (2) Hx/of Hep C in the 1998 and had Ribavarin and INF  - he required numerous blood transfusion in the '80s due to his UC     (3) Asthma     (4) Hx/of legionella pneumonia and TB positive in past - seen by Dr Lui in past     (5) Eczema and seborrhea-like dermatitis     (6) SEAN     (7) SVT     (8) UC hx with total colectomy in 1985 - followed by Dr Duenas and Dr Avalos with GI     (9) Allergy/Hives issues - on injections with Dr Parker    (10) Low grade bladder ca s/p excision  - He was recently found to have low grade bladder cancer in July 2022 with planned repeat scopes 4x per year and for which he is followed by Dr Teixeira    6/1/2023:  - He recent repeat scope with Dr Teixeira; he had increase in his PSA and ended up having MRI of the prostate; he previously had a low grade bladder cancer in July 2022 . Possible prostatitis and treated with cipro po abx        VISIT DIAGNOSES:      Hx/of venous embolism and thrombosis of axillary veins, right    Methylenetetrahydrofolate reductase deficiency    History of ulcerative colitis    History of colectomy    MTHFR mutation          PLAN:  1. F/u with PCP as directed  2. Continue folbic or  folbic equivalent supplements  4. Check homocysteine level every 6 months  5. Continue urologic care and observation with Dr Teixeira  6. GI aware of the recent MRI report and are following peripherally  7.  F/u with PCP, Pulm, GI,  etc    RTC in 6 months  Fax note to Milan Chen III, *; Keith Avalos Schuette; Lluvia    Discussion:     COVID-19 Discussion:     I had long discussion with patient and any applicable family about the COVID-19 coronavirus epidemic and the recommended precautions with regard to cancer and/or hematology patients. I have re-iterated the CDC recommendations for adequate hand washing, use of hand -like products, and coughing into elbow, etc. In addition, especially for our patients who are on chemotherapy and/or our otherwise immunocompromised patients, I have recommended avoidance of crowds, including movie theaters, restaurants, churches, etc. I have recommended avoidance of any sick or symptomatic family members and/or friends. I have also recommended avoidance of any raw and unwashed food products, and general avoidance of food items that have not been prepared by themselves. The patient has been asked to call us immediately with any symptom developments, issues, questions or other general concerns.      Anticoagulation Discussion:     Discussed with patient and any applicable family members about the benefit and/or need for anticoagulation. I communicated about the risks of bleeding while on any anticoagulation, which could be serious and/or life-threatening, and which can occur at any time, regardless of degree of the level of anticoagulation. I expressed the need for compliance with any anticoagulation regimen and that failure to do so could potential lead to excessive bleeding, and risk to health and/or life. In particular, with patients on coumadin therapy, compliance with requested blood work is absolutely essential, as coumadin levels can vary from  time to time, and failure to do so could potentially place the patient at risk for bleeding and/or clotting events which could be fatal. Patients on coumadin are encouraged to call the day after they have their levels drawn, as to obtain the appropriate instructions from my staff. Patients are aware that self-regulating or self-dosing of their medications is strictly prohibited.      I spent over 25 mins of time with the patient. Reviewed results of the recently ordered labs, tests and studies; made directives with regards to the results. Over half of this time was spent couseling and coordinating care.    I have explained all of the above in detail and the patient understands all of the current recommendation(s). I have answered all of their questions to the best of my ability and to their complete satisfaction.   The patient is to continue with the current management plan.            Electronically signed by Ronen Blackman MD                   Answers submitted by the patient for this visit:  Review of Systems Questionnaire (Submitted on 5/29/2023)  appetite change : No  unexpected weight change: No  mouth sores: No  visual disturbance: No  cough: No  shortness of breath: No  chest pain: No  abdominal pain: No  diarrhea: No  frequency: No  back pain: Yes  rash: No  headaches: No  adenopathy: No  nervous/ anxious: No

## 2023-06-01 ENCOUNTER — OFFICE VISIT (OUTPATIENT)
Dept: HEMATOLOGY/ONCOLOGY | Facility: CLINIC | Age: 68
End: 2023-06-01
Payer: MEDICARE

## 2023-06-01 VITALS
HEART RATE: 69 BPM | SYSTOLIC BLOOD PRESSURE: 146 MMHG | WEIGHT: 180.31 LBS | HEIGHT: 72 IN | TEMPERATURE: 98 F | DIASTOLIC BLOOD PRESSURE: 87 MMHG | RESPIRATION RATE: 18 BRPM | BODY MASS INDEX: 24.42 KG/M2

## 2023-06-01 DIAGNOSIS — E72.12 METHYLENETETRAHYDROFOLATE REDUCTASE DEFICIENCY: ICD-10-CM

## 2023-06-01 DIAGNOSIS — Z90.49 HISTORY OF COLECTOMY: ICD-10-CM

## 2023-06-01 DIAGNOSIS — Z87.19 HISTORY OF ULCERATIVE COLITIS: ICD-10-CM

## 2023-06-01 DIAGNOSIS — R93.5 ABNORMAL MRI OF ABDOMEN: ICD-10-CM

## 2023-06-01 DIAGNOSIS — I82.A11: Primary | ICD-10-CM

## 2023-06-01 DIAGNOSIS — Z15.89 MTHFR MUTATION: ICD-10-CM

## 2023-06-01 PROCEDURE — 1160F PR REVIEW ALL MEDS BY PRESCRIBER/CLIN PHARMACIST DOCUMENTED: ICD-10-PCS | Mod: CPTII,S$GLB,, | Performed by: INTERNAL MEDICINE

## 2023-06-01 PROCEDURE — 3079F DIAST BP 80-89 MM HG: CPT | Mod: CPTII,S$GLB,, | Performed by: INTERNAL MEDICINE

## 2023-06-01 PROCEDURE — 99213 OFFICE O/P EST LOW 20 MIN: CPT | Mod: S$GLB,,, | Performed by: INTERNAL MEDICINE

## 2023-06-01 PROCEDURE — 99213 PR OFFICE/OUTPT VISIT, EST, LEVL III, 20-29 MIN: ICD-10-PCS | Mod: S$GLB,,, | Performed by: INTERNAL MEDICINE

## 2023-06-01 PROCEDURE — 3077F PR MOST RECENT SYSTOLIC BLOOD PRESSURE >= 140 MM HG: ICD-10-PCS | Mod: CPTII,S$GLB,, | Performed by: INTERNAL MEDICINE

## 2023-06-01 PROCEDURE — 3079F PR MOST RECENT DIASTOLIC BLOOD PRESSURE 80-89 MM HG: ICD-10-PCS | Mod: CPTII,S$GLB,, | Performed by: INTERNAL MEDICINE

## 2023-06-01 PROCEDURE — 1101F PR PT FALLS ASSESS DOC 0-1 FALLS W/OUT INJ PAST YR: ICD-10-PCS | Mod: CPTII,S$GLB,, | Performed by: INTERNAL MEDICINE

## 2023-06-01 PROCEDURE — 1159F MED LIST DOCD IN RCRD: CPT | Mod: CPTII,S$GLB,, | Performed by: INTERNAL MEDICINE

## 2023-06-01 PROCEDURE — 1160F RVW MEDS BY RX/DR IN RCRD: CPT | Mod: CPTII,S$GLB,, | Performed by: INTERNAL MEDICINE

## 2023-06-01 PROCEDURE — 3288F FALL RISK ASSESSMENT DOCD: CPT | Mod: CPTII,S$GLB,, | Performed by: INTERNAL MEDICINE

## 2023-06-01 PROCEDURE — 1125F PR PAIN SEVERITY QUANTIFIED, PAIN PRESENT: ICD-10-PCS | Mod: CPTII,S$GLB,, | Performed by: INTERNAL MEDICINE

## 2023-06-01 PROCEDURE — 3008F PR BODY MASS INDEX (BMI) DOCUMENTED: ICD-10-PCS | Mod: CPTII,S$GLB,, | Performed by: INTERNAL MEDICINE

## 2023-06-01 PROCEDURE — 1159F PR MEDICATION LIST DOCUMENTED IN MEDICAL RECORD: ICD-10-PCS | Mod: CPTII,S$GLB,, | Performed by: INTERNAL MEDICINE

## 2023-06-01 PROCEDURE — 3008F BODY MASS INDEX DOCD: CPT | Mod: CPTII,S$GLB,, | Performed by: INTERNAL MEDICINE

## 2023-06-01 PROCEDURE — 1101F PT FALLS ASSESS-DOCD LE1/YR: CPT | Mod: CPTII,S$GLB,, | Performed by: INTERNAL MEDICINE

## 2023-06-01 PROCEDURE — 3288F PR FALLS RISK ASSESSMENT DOCUMENTED: ICD-10-PCS | Mod: CPTII,S$GLB,, | Performed by: INTERNAL MEDICINE

## 2023-06-01 PROCEDURE — 1125F AMNT PAIN NOTED PAIN PRSNT: CPT | Mod: CPTII,S$GLB,, | Performed by: INTERNAL MEDICINE

## 2023-06-01 PROCEDURE — 3077F SYST BP >= 140 MM HG: CPT | Mod: CPTII,S$GLB,, | Performed by: INTERNAL MEDICINE

## 2023-06-10 DIAGNOSIS — J30.1 NON-SEASONAL ALLERGIC RHINITIS DUE TO POLLEN: ICD-10-CM

## 2023-06-12 ENCOUNTER — OFFICE VISIT (OUTPATIENT)
Dept: FAMILY MEDICINE | Facility: CLINIC | Age: 68
End: 2023-06-12
Payer: MEDICARE

## 2023-06-12 VITALS
HEIGHT: 72 IN | TEMPERATURE: 98 F | OXYGEN SATURATION: 96 % | SYSTOLIC BLOOD PRESSURE: 120 MMHG | BODY MASS INDEX: 24.29 KG/M2 | DIASTOLIC BLOOD PRESSURE: 76 MMHG | WEIGHT: 179.31 LBS | HEART RATE: 73 BPM

## 2023-06-12 DIAGNOSIS — Z90.49 HISTORY OF COLECTOMY: ICD-10-CM

## 2023-06-12 DIAGNOSIS — E78.5 HYPERLIPIDEMIA, UNSPECIFIED HYPERLIPIDEMIA TYPE: ICD-10-CM

## 2023-06-12 DIAGNOSIS — G47.33 OSA ON CPAP: ICD-10-CM

## 2023-06-12 DIAGNOSIS — T16.1XXA FOREIGN BODY OF RIGHT EAR, INITIAL ENCOUNTER: ICD-10-CM

## 2023-06-12 DIAGNOSIS — Z00.00 PHYSICAL EXAM: Primary | ICD-10-CM

## 2023-06-12 DIAGNOSIS — Z87.19 HISTORY OF ULCERATIVE COLITIS: ICD-10-CM

## 2023-06-12 PROCEDURE — 99397 PR PREVENTIVE VISIT,EST,65 & OVER: ICD-10-PCS | Mod: 25,S$GLB,, | Performed by: FAMILY MEDICINE

## 2023-06-12 PROCEDURE — 3288F FALL RISK ASSESSMENT DOCD: CPT | Mod: CPTII,S$GLB,, | Performed by: FAMILY MEDICINE

## 2023-06-12 PROCEDURE — 1126F AMNT PAIN NOTED NONE PRSNT: CPT | Mod: CPTII,S$GLB,, | Performed by: FAMILY MEDICINE

## 2023-06-12 PROCEDURE — 1101F PR PT FALLS ASSESS DOC 0-1 FALLS W/OUT INJ PAST YR: ICD-10-PCS | Mod: CPTII,S$GLB,, | Performed by: FAMILY MEDICINE

## 2023-06-12 PROCEDURE — 1101F PT FALLS ASSESS-DOCD LE1/YR: CPT | Mod: CPTII,S$GLB,, | Performed by: FAMILY MEDICINE

## 2023-06-12 PROCEDURE — 3288F PR FALLS RISK ASSESSMENT DOCUMENTED: ICD-10-PCS | Mod: CPTII,S$GLB,, | Performed by: FAMILY MEDICINE

## 2023-06-12 PROCEDURE — 1160F PR REVIEW ALL MEDS BY PRESCRIBER/CLIN PHARMACIST DOCUMENTED: ICD-10-PCS | Mod: CPTII,S$GLB,, | Performed by: FAMILY MEDICINE

## 2023-06-12 PROCEDURE — 3074F SYST BP LT 130 MM HG: CPT | Mod: CPTII,S$GLB,, | Performed by: FAMILY MEDICINE

## 2023-06-12 PROCEDURE — 1159F PR MEDICATION LIST DOCUMENTED IN MEDICAL RECORD: ICD-10-PCS | Mod: CPTII,S$GLB,, | Performed by: FAMILY MEDICINE

## 2023-06-12 PROCEDURE — 1160F RVW MEDS BY RX/DR IN RCRD: CPT | Mod: CPTII,S$GLB,, | Performed by: FAMILY MEDICINE

## 2023-06-12 PROCEDURE — 3078F DIAST BP <80 MM HG: CPT | Mod: CPTII,S$GLB,, | Performed by: FAMILY MEDICINE

## 2023-06-12 PROCEDURE — 69200 CLEAR OUTER EAR CANAL: CPT | Mod: RT,S$GLB,, | Performed by: FAMILY MEDICINE

## 2023-06-12 PROCEDURE — 1126F PR PAIN SEVERITY QUANTIFIED, NO PAIN PRESENT: ICD-10-PCS | Mod: CPTII,S$GLB,, | Performed by: FAMILY MEDICINE

## 2023-06-12 PROCEDURE — 99397 PER PM REEVAL EST PAT 65+ YR: CPT | Mod: 25,S$GLB,, | Performed by: FAMILY MEDICINE

## 2023-06-12 PROCEDURE — 3074F PR MOST RECENT SYSTOLIC BLOOD PRESSURE < 130 MM HG: ICD-10-PCS | Mod: CPTII,S$GLB,, | Performed by: FAMILY MEDICINE

## 2023-06-12 PROCEDURE — 3078F PR MOST RECENT DIASTOLIC BLOOD PRESSURE < 80 MM HG: ICD-10-PCS | Mod: CPTII,S$GLB,, | Performed by: FAMILY MEDICINE

## 2023-06-12 PROCEDURE — 69200 PR REMV EXT CANAL FOREIGN BODY: ICD-10-PCS | Mod: RT,S$GLB,, | Performed by: FAMILY MEDICINE

## 2023-06-12 PROCEDURE — 3008F PR BODY MASS INDEX (BMI) DOCUMENTED: ICD-10-PCS | Mod: CPTII,S$GLB,, | Performed by: FAMILY MEDICINE

## 2023-06-12 PROCEDURE — 1159F MED LIST DOCD IN RCRD: CPT | Mod: CPTII,S$GLB,, | Performed by: FAMILY MEDICINE

## 2023-06-12 PROCEDURE — 3008F BODY MASS INDEX DOCD: CPT | Mod: CPTII,S$GLB,, | Performed by: FAMILY MEDICINE

## 2023-06-12 RX ORDER — LEVOCETIRIZINE DIHYDROCHLORIDE 5 MG/1
TABLET, FILM COATED ORAL
Qty: 180 TABLET | Refills: 1 | Status: SHIPPED | OUTPATIENT
Start: 2023-06-12

## 2023-06-15 NOTE — PROGRESS NOTES
Subjective:       Patient ID: Faisal Quiros is a 67 y.o. male.    Chief Complaint: Annual Exam    Here for physical.  Nonsmoker no significant alcohol intake.  Exercises.  Has been traveling some.    Family history no changes.    Past medical history.  History of ulcerative colitis.  Status post colectomy.  Obstructive sleep apnea on CPAP.  Heterozygous MTHFR.  On folic acid.  Homocystine is down to 6.7.  History of venous thrombosis.  Some chronic back pain.  Some BPH.  PSA had been at 2.9 down to 1.6 with antibiotics.  Had MRI of the prostate.  Followed by Urology.  Small lymph node 6 or 7 mm which is being observed in the perirectal area.    Labs cholesterol 172 HDL 65 LDL 90.  B12 1521.  CMP and CBC are normal.    Review of Systems   Constitutional: Negative.    HENT: Negative.     Eyes: Negative.    Respiratory: Negative.     Cardiovascular: Negative.    Gastrointestinal: Negative.    Endocrine: Negative.    Genitourinary: Negative.    Musculoskeletal:  Positive for back pain.   Skin: Negative.    Neurological:  Positive for numbness.   Hematological: Negative.    Psychiatric/Behavioral: Negative.       Objective:      Physical Exam  Vitals reviewed.   Constitutional:       Appearance: Normal appearance. He is well-developed and normal weight.   HENT:      Head: Normocephalic and atraumatic.      Right Ear: Tympanic membrane normal.      Left Ear: Tympanic membrane normal.      Ears:      Comments: Has what appears to be a black material in the right ear canal.     Nose: Nose normal.      Mouth/Throat:      Mouth: Mucous membranes are moist.      Pharynx: No oropharyngeal exudate.   Eyes:      Conjunctiva/sclera: Conjunctivae normal.      Pupils: Pupils are equal, round, and reactive to light.   Neck:      Vascular: No carotid bruit.   Cardiovascular:      Rate and Rhythm: Normal rate and regular rhythm.      Pulses: Normal pulses.      Heart sounds: Normal heart sounds. No murmur heard.    No gallop.    Pulmonary:      Effort: Pulmonary effort is normal.      Breath sounds: Normal breath sounds.   Abdominal:      General: Bowel sounds are normal.      Palpations: Abdomen is soft. There is no mass.      Tenderness: There is no abdominal tenderness.   Musculoskeletal:         General: Normal range of motion.      Cervical back: Normal range of motion.   Skin:     General: Skin is warm and dry.   Neurological:      General: No focal deficit present.      Mental Status: He is alert and oriented to person, place, and time.   Psychiatric:         Mood and Affect: Mood normal.         Behavior: Behavior normal.       Assessment:       1. Physical exam    2. Hyperlipidemia, unspecified hyperlipidemia type    3. Foreign body of right ear, initial encounter    4. SEAN on CPAP    5. History of colectomy    6. History of ulcerative colitis        Plan:       Physical exam  -     Comprehensive Metabolic Panel; Future; Expected date: 12/12/2023    Hyperlipidemia, unspecified hyperlipidemia type  -     CBC Auto Differential; Future; Expected date: 12/12/2023  -     Lipid Panel; Future; Expected date: 12/12/2023    Foreign body of right ear, initial encounter  -     Ear wax removal    SEAN on CPAP    History of colectomy    History of ulcerative colitis    Follow-up in 12 months with labs for his routine physical.  Continue follow-up with urology.  Add with Hematology.    Right ear lavage.  Foreign body removed from the right ear canal with lavage.  This turned out to be a ear tip off of a beer piece.

## 2023-07-12 NOTE — PATIENT INSTRUCTIONS
Nail Fungal Infection  A nail fungal infection changes the way fingernails and toenails look. They may thicken, discolor, change shape, or split. This condition is hard to treat because nails grow slowly and have limited blood supply. The infection often comes back after treatment.  There are 2 types of medicines used to treat this condition:  Topical anti-fungal medicines. These are applied to the surface of the skin and nail area. These medicines are not very effective because they cant get deep into the nail.  Oral antifungal medicines. These medicines work better because they go into the nail from the inside out. But the infection may still come back. It may take 9 to 12 months for your nail to look normal again. This means you are cured. You can repeat treatment if needed. Most people take these medicines without any problems. It is rare to stop therapy because of side effects. But your healthcare provider may give you some monitoring tests. Talk about possible side effects with your provider before starting treatment.  If medicines fail, the nail can be removed surgically or chemically. These methods physically remove the fungus from the body. This helps medical treatment be more effective.  Home care  Use medicines exactly as directed for as long as directed. Treating a fungal infection can take longer than other kinds of infections.  Smoking is a risk factor for fungal infection. This is one more reason to quit.  Wear absorbent socks, and shoes that let your feet breathe. Sweaty feet increase your risk of fungal infection. They also make an existing infection harder to treat.  Use footwear when in damp public places like swimming pools, gyms, and shower rooms. This will help you avoid the fungus that grows there.  Don't share nail clippers or scissors with others.  Follow-up care  Follow up with your healthcare provider, or as advised.  When to seek medical advice  Call your healthcare provider right away  if any of these occur:  Skin by the nail becomes red, swollen, painful, or drains pus (a creamy yellow or white liquid)  Side effects from oral anti-fungal medicines  Date Last Reviewed: 8/1/2016  © 8619-9477 [employer]. [address] [place] [address]. All rights reserved. This information is not intended as a substitute for professional medical care. Always follow your healthcare professional's instructions.

## 2023-07-18 ENCOUNTER — OFFICE VISIT (OUTPATIENT)
Dept: PODIATRY | Facility: CLINIC | Age: 68
End: 2023-07-18
Payer: MEDICARE

## 2023-07-18 VITALS — BODY MASS INDEX: 24.24 KG/M2 | HEIGHT: 72 IN | WEIGHT: 179 LBS

## 2023-07-18 DIAGNOSIS — B35.1 ONYCHOMYCOSIS DUE TO DERMATOPHYTE: Primary | ICD-10-CM

## 2023-07-18 DIAGNOSIS — L60.3 ONYCHODYSTROPHY: ICD-10-CM

## 2023-07-18 PROCEDURE — 1101F PR PT FALLS ASSESS DOC 0-1 FALLS W/OUT INJ PAST YR: ICD-10-PCS | Mod: CPTII,S$GLB,, | Performed by: PODIATRIST

## 2023-07-18 PROCEDURE — 3288F FALL RISK ASSESSMENT DOCD: CPT | Mod: CPTII,S$GLB,, | Performed by: PODIATRIST

## 2023-07-18 PROCEDURE — 99999 PR PBB SHADOW E&M-EST. PATIENT-LVL III: CPT | Mod: PBBFAC,,, | Performed by: PODIATRIST

## 2023-07-18 PROCEDURE — 1126F PR PAIN SEVERITY QUANTIFIED, NO PAIN PRESENT: ICD-10-PCS | Mod: CPTII,S$GLB,, | Performed by: PODIATRIST

## 2023-07-18 PROCEDURE — 1160F RVW MEDS BY RX/DR IN RCRD: CPT | Mod: CPTII,S$GLB,, | Performed by: PODIATRIST

## 2023-07-18 PROCEDURE — 1159F MED LIST DOCD IN RCRD: CPT | Mod: CPTII,S$GLB,, | Performed by: PODIATRIST

## 2023-07-18 PROCEDURE — 1159F PR MEDICATION LIST DOCUMENTED IN MEDICAL RECORD: ICD-10-PCS | Mod: CPTII,S$GLB,, | Performed by: PODIATRIST

## 2023-07-18 PROCEDURE — 1160F PR REVIEW ALL MEDS BY PRESCRIBER/CLIN PHARMACIST DOCUMENTED: ICD-10-PCS | Mod: CPTII,S$GLB,, | Performed by: PODIATRIST

## 2023-07-18 PROCEDURE — 3008F PR BODY MASS INDEX (BMI) DOCUMENTED: ICD-10-PCS | Mod: CPTII,S$GLB,, | Performed by: PODIATRIST

## 2023-07-18 PROCEDURE — 3008F BODY MASS INDEX DOCD: CPT | Mod: CPTII,S$GLB,, | Performed by: PODIATRIST

## 2023-07-18 PROCEDURE — 99203 OFFICE O/P NEW LOW 30 MIN: CPT | Mod: S$GLB,,, | Performed by: PODIATRIST

## 2023-07-18 PROCEDURE — 1126F AMNT PAIN NOTED NONE PRSNT: CPT | Mod: CPTII,S$GLB,, | Performed by: PODIATRIST

## 2023-07-18 PROCEDURE — 1101F PT FALLS ASSESS-DOCD LE1/YR: CPT | Mod: CPTII,S$GLB,, | Performed by: PODIATRIST

## 2023-07-18 PROCEDURE — 99203 PR OFFICE/OUTPT VISIT, NEW, LEVL III, 30-44 MIN: ICD-10-PCS | Mod: S$GLB,,, | Performed by: PODIATRIST

## 2023-07-18 PROCEDURE — 3288F PR FALLS RISK ASSESSMENT DOCUMENTED: ICD-10-PCS | Mod: CPTII,S$GLB,, | Performed by: PODIATRIST

## 2023-07-18 PROCEDURE — 99999 PR PBB SHADOW E&M-EST. PATIENT-LVL III: ICD-10-PCS | Mod: PBBFAC,,, | Performed by: PODIATRIST

## 2023-07-18 RX ORDER — FLUCONAZOLE 200 MG/1
200 TABLET ORAL WEEKLY
Qty: 28 TABLET | Refills: 0 | Status: SHIPPED | OUTPATIENT
Start: 2023-07-18 | End: 2024-01-24

## 2023-07-18 NOTE — PROGRESS NOTES
[address] [facility] [address]  [place] [address]  Phone: [phone]   [phone]    Patient's PCP:Milan Chen III, MD  Referring Provider: Aaareferral Self    Subjective:      Chief Complaint:: Nail Problem (Left 1st toenail fungus)    Nail Problem  Pertinent negatives include no abdominal pain, arthralgias, chest pain, chills, coughing, fatigue, fever, headaches, joint swelling, myalgias, nausea, rash or weakness.   Faisal Quiros is a 67 y.o. male who presents today with a complaint of toenail fungus left 1st toenail. The current episode started awhile ago.  The symptoms include discoloration, annoyance. Probable cause of complaint unknown.  The symptoms are aggravated by none. The problem has varied. Treatment to date have included Vitamin E capsules, Vicks Vapor Rub, Vaseline which provided some relief.     Vitals:    07/18/23 0845   Weight: 81.2 kg (179 lb)   Height: 6' (1.829 m)   PainSc: 0-No pain      Shoe Size:     Past Surgical History:   Procedure Laterality Date    APPENDECTOMY      COLON SURGERY      CYSTOSCOPY N/A 7/7/2022    Procedure: CYSTOSCOPY;  Surgeon: Garland Teixeira MD;  Location: [facility] OR;  Service: Urology;  Laterality: N/A;    FLEXIBLE SIGMOIDOSCOPY N/A 4/8/2022    Procedure: SIGMOIDOSCOPY, FLEXIBLE;  Surgeon: Michael Avalos MD;  Location: [facility] ENDO;  Service: Endoscopy;  Laterality: N/A;    LUMBAR DISC SURGERY  2007    pet scan      right shoulder surgery  2004 and 2019    x2     SHOULDER ARTHROSCOPY Right 8/28/2019    Procedure: ARTHROSCOPY, SHOULDER;  Surgeon: Stone Suarez MD;  Location: [facility] OR;  Service: Orthopedics;  Laterality: Right;  ARTHREX NOTIFIED    total proctocolectomy  1985    WISDOM TOOTH EXTRACTION       Past Medical History:   Diagnosis Date    Allergic rhinitis     Cancer     skin    DVT (deep vein thrombosis) in pregnancy 2021    right axillary    Hx of seasonal allergies 2000    Hx/of venous embolism and thrombosis of axillary veins, right  10/11/2021    Legionella pneumonia     Lupus anticoagulant positive 12/28/2021 4/6/22 pt states negative    Right shoulder pain 2018    Seborrhea-like dermatitis with psoriasiform elements     Sleep apnea 2017    uses cpap nightly    SVT (supraventricular tachycardia) 2017    Transfusion reaction     hep c-- 1980's & treated     Family History   Problem Relation Age of Onset    Hypertension Mother     Allergic rhinitis Neg Hx     Allergies Neg Hx     Angioedema Neg Hx     Asthma Neg Hx     Eczema Neg Hx     Atopy Neg Hx     Immunodeficiency Neg Hx     Rhinitis Neg Hx     Urticaria Neg Hx         Social History:   Marital Status:   Alcohol History:  reports no history of alcohol use.  Tobacco History:  reports that he has never smoked. He has never used smokeless tobacco.  Drug History:  reports no history of drug use.    Review of patient's allergies indicates:  No Known Allergies    Current Outpatient Medications   Medication Sig Dispense Refill    ascorbic acid, vitamin C, (VITAMIN C) 1000 MG tablet       B-complex with vitamin C (Z-BEC OR EQUIV) tablet       calcium-vitamin D 600 mg-10 mcg (400 unit) Tab       cartilage/collagen II/hyaluron (MOVE FREE ULTRA ORAL)       CITICOLINE ORAL       cyanocobalamin, vitamin B-12, (VITAMIN B-12) 2,500 mcg Subl       fluconazole (DIFLUCAN) 200 MG Tab Take 1 tablet (200 mg total) by mouth once a week. for 28 doses 28 tablet 0    krill-om-3-dha-epa-phospho-ast (KRILL OIL) 110-456-70-75 mg Cap       levocetirizine (XYZAL) 5 MG tablet TAKE 1 TABLET BY MOUTH TWICE A  tablet 1    multivitamin-minerals-lutein Tab       pyridoxine, vitamin B6, (B-6) 100 MG Tab        Current Facility-Administered Medications   Medication Dose Route Frequency Provider Last Rate Last Admin    Allergy Mix   Subcutaneous Q30 Days Karen Parker MD   Given at 07/13/22 0821       Review of Systems   Constitutional:  Negative for chills, fatigue, fever and unexpected weight change.    HENT:  Negative for hearing loss and trouble swallowing.    Eyes:  Negative for photophobia and visual disturbance.   Respiratory:  Negative for cough, shortness of breath and wheezing.    Cardiovascular:  Negative for chest pain, palpitations and leg swelling.   Gastrointestinal:  Negative for abdominal pain and nausea.   Genitourinary:  Negative for dysuria and frequency.   Musculoskeletal:  Negative for arthralgias, back pain, gait problem, joint swelling and myalgias.   Skin:  Negative for rash and wound.   Neurological:  Negative for tremors, seizures, speech difficulty, weakness and headaches.   Hematological:  Does not bruise/bleed easily.       Objective:        Physical Exam:   Foot Exam    General  General Appearance: appears stated age and healthy   Orientation: alert and oriented to person, place, and time   Affect: appropriate   Gait: unimpaired       Left Foot/Ankle      Inspection and Palpation  Ecchymosis: none  Tenderness: none   Swelling: none   Arch: normal  Hammertoes: absent  Claw toes: absent  Hallux valgus: no  Hallux limitus: no  Skin Exam: skin intact; no drainage, no ulcer and no erythema   Fungus Toenails: present    Neurovascular  Dorsalis pedis: 2+  Posterior tibial: 2+  Capillary refill: 2+  Varicose veins: not present  Saphenous nerve sensation: normal  Tibial nerve sensation: normal  Superficial peroneal nerve sensation: normal  Deep peroneal nerve sensation: normal  Sural nerve sensation: normal    Muscle Strength  Ankle dorsiflexion: 5  Ankle plantar flexion: 5  Ankle inversion: 5  Ankle eversion: 5  Great toe extension: 5  Great toe flexion: 5    Range of Motion    Normal left ankle ROM    Tests  Anterior drawer: negative   Talar tilt: negative   PT Tinel's sign: negative  Paresthesia: negative  Comments  Discoloration dystrophy of the 1st and 4th toenails.  No tenderness to palpation.  No signs of ingrowing.  Physical Exam  Cardiovascular:      Pulses:           Dorsalis pedis  pulses are 2+ on the left side.        Posterior tibial pulses are 2+ on the left side.   Musculoskeletal:      Left foot: No bunion.   Feet:      Left foot:      Skin integrity: No ulcer or erythema.      Toenail Condition: Fungal disease present.            Left Ankle/Foot Exam     Range of Motion   The patient has normal left ankle ROM.     Comments:  Discoloration dystrophy of the 1st and 4th toenails.  No tenderness to palpation.  No signs of ingrowing.      Muscle Strength   Left Lower Extremity   Ankle Dorsiflexion:  5   Plantar flexion:  5/5     Vascular Exam       Left Pulses  Dorsalis Pedis:      2+  Posterior Tibial:      2+         Imaging: none            Assessment:       1. Onychomycosis due to dermatophyte    2. Onychodystrophy      Plan:   Onychomycosis due to dermatophyte  -     fluconazole (DIFLUCAN) 200 MG Tab; Take 1 tablet (200 mg total) by mouth once a week. for 28 doses  Dispense: 28 tablet; Refill: 0    Onychodystrophy  -     fluconazole (DIFLUCAN) 200 MG Tab; Take 1 tablet (200 mg total) by mouth once a week. for 28 doses  Dispense: 28 tablet; Refill: 0      Follow up if symptoms worsen or fail to improve.    Procedures        Fungal infection of toenails explained. Treatment options including no treatment, periodic debridement, topical medications, oral medications, and removal of the nail were discussed, as well as success rates and risks of recurrence.  I am going to send in oral Diflucan for him.    Counseling:     I provided patient education verbally regarding:   Patient diagnosis, treatment options, as well as alternatives, risks, and benefits.     This note was created using Dragon voice recognition software that occasionally misinterpreted phrases or words.

## 2023-08-28 ENCOUNTER — PATIENT MESSAGE (OUTPATIENT)
Dept: FAMILY MEDICINE | Facility: CLINIC | Age: 68
End: 2023-08-28
Payer: MEDICARE

## 2023-08-30 ENCOUNTER — OFFICE VISIT (OUTPATIENT)
Dept: ORTHOPEDICS | Facility: CLINIC | Age: 68
End: 2023-08-30
Payer: MEDICARE

## 2023-08-30 VITALS — HEIGHT: 72 IN | BODY MASS INDEX: 24.24 KG/M2 | WEIGHT: 179 LBS

## 2023-08-30 DIAGNOSIS — M77.11 LATERAL EPICONDYLITIS OF RIGHT ELBOW: Primary | ICD-10-CM

## 2023-08-30 PROCEDURE — 99213 OFFICE O/P EST LOW 20 MIN: CPT | Mod: 25,S$GLB,, | Performed by: PHYSICIAN ASSISTANT

## 2023-08-30 PROCEDURE — 20550 NJX 1 TENDON SHEATH/LIGAMENT: CPT | Mod: RT,S$GLB,, | Performed by: PHYSICIAN ASSISTANT

## 2023-08-30 PROCEDURE — 1159F MED LIST DOCD IN RCRD: CPT | Mod: CPTII,S$GLB,, | Performed by: PHYSICIAN ASSISTANT

## 2023-08-30 PROCEDURE — 99213 PR OFFICE/OUTPT VISIT, EST, LEVL III, 20-29 MIN: ICD-10-PCS | Mod: 25,S$GLB,, | Performed by: PHYSICIAN ASSISTANT

## 2023-08-30 PROCEDURE — 3008F PR BODY MASS INDEX (BMI) DOCUMENTED: ICD-10-PCS | Mod: CPTII,S$GLB,, | Performed by: PHYSICIAN ASSISTANT

## 2023-08-30 PROCEDURE — 3008F BODY MASS INDEX DOCD: CPT | Mod: CPTII,S$GLB,, | Performed by: PHYSICIAN ASSISTANT

## 2023-08-30 PROCEDURE — 1125F PR PAIN SEVERITY QUANTIFIED, PAIN PRESENT: ICD-10-PCS | Mod: CPTII,S$GLB,, | Performed by: PHYSICIAN ASSISTANT

## 2023-08-30 PROCEDURE — 1101F PT FALLS ASSESS-DOCD LE1/YR: CPT | Mod: CPTII,S$GLB,, | Performed by: PHYSICIAN ASSISTANT

## 2023-08-30 PROCEDURE — 1159F PR MEDICATION LIST DOCUMENTED IN MEDICAL RECORD: ICD-10-PCS | Mod: CPTII,S$GLB,, | Performed by: PHYSICIAN ASSISTANT

## 2023-08-30 PROCEDURE — 1101F PR PT FALLS ASSESS DOC 0-1 FALLS W/OUT INJ PAST YR: ICD-10-PCS | Mod: CPTII,S$GLB,, | Performed by: PHYSICIAN ASSISTANT

## 2023-08-30 PROCEDURE — 3288F PR FALLS RISK ASSESSMENT DOCUMENTED: ICD-10-PCS | Mod: CPTII,S$GLB,, | Performed by: PHYSICIAN ASSISTANT

## 2023-08-30 PROCEDURE — 20550 PR INJECT TENDON SHEATH/LIGAMENT: ICD-10-PCS | Mod: RT,S$GLB,, | Performed by: PHYSICIAN ASSISTANT

## 2023-08-30 PROCEDURE — 3288F FALL RISK ASSESSMENT DOCD: CPT | Mod: CPTII,S$GLB,, | Performed by: PHYSICIAN ASSISTANT

## 2023-08-30 PROCEDURE — 1125F AMNT PAIN NOTED PAIN PRSNT: CPT | Mod: CPTII,S$GLB,, | Performed by: PHYSICIAN ASSISTANT

## 2023-08-30 RX ORDER — TRIAMCINOLONE ACETONIDE 40 MG/ML
40 INJECTION, SUSPENSION INTRA-ARTICULAR; INTRAMUSCULAR
Status: DISCONTINUED | OUTPATIENT
Start: 2023-08-30 | End: 2023-08-30 | Stop reason: HOSPADM

## 2023-08-30 RX ADMIN — TRIAMCINOLONE ACETONIDE 40 MG: 40 INJECTION, SUSPENSION INTRA-ARTICULAR; INTRAMUSCULAR at 11:08

## 2023-08-30 NOTE — PROCEDURES
Tendon Sheath    Date/Time: 8/30/2023 11:15 AM    Performed by: Zeeshan Soliman PA  Authorized by: Zeeshan Soliman PA    Consent Done?:  Yes (Verbal)  Indications:  Pain  Site marked: the procedure site was marked    Timeout: prior to procedure the correct patient, procedure, and site was verified    Prep: patient was prepped and draped in usual sterile fashion      Local anesthesia used?: Yes    Local anesthetic:  Lidocaine 1% without epinephrine  Location: RT lat epicondyle.  Ultrasonic guidance for needle placement?: No    Needle size:  25 G  Medications:  40 mg triamcinolone acetonide 40 mg/mL  Patient tolerance:  Patient tolerated the procedure well with no immediate complications

## 2023-08-30 NOTE — PROGRESS NOTES
[facility]ED[facility]  [address] [facility] [address]  [place] [address]  Phone: [phone]   [phone]    Patient's PCP: Milan Chen III, MD  Referring Provider: No ref. provider found    Subjective:      Chief Complaint:   Chief Complaint   Patient presents with    Right Elbow - Pain     Right lateral epicondylitis. Received inj 12/29/22 which offered relief until he used some yard tools which caused him pain again.       Past Medical History:   Diagnosis Date    Allergic rhinitis     Cancer     skin    DVT (deep vein thrombosis) in pregnancy 2021    right axillary    Hx of seasonal allergies 2000    Hx/of venous embolism and thrombosis of axillary veins, right 10/11/2021    Legionella pneumonia     Lupus anticoagulant positive 12/28/2021 4/6/22 pt states negative    Right shoulder pain 2018    Seborrhea-like dermatitis with psoriasiform elements     Sleep apnea 2017    uses cpap nightly    SVT (supraventricular tachycardia) 2017    Transfusion reaction     hep c-- 1980's & treated       Past Surgical History:   Procedure Laterality Date    APPENDECTOMY      COLON SURGERY      CYSTOSCOPY N/A 7/7/2022    Procedure: CYSTOSCOPY;  Surgeon: Garland Teixeira MD;  Location: [facility] OR;  Service: Urology;  Laterality: N/A;    FLEXIBLE SIGMOIDOSCOPY N/A 4/8/2022    Procedure: SIGMOIDOSCOPY, FLEXIBLE;  Surgeon: Michael Avalos MD;  Location: [facility] ENDO;  Service: Endoscopy;  Laterality: N/A;    LUMBAR DISC SURGERY  2007    pet scan      right shoulder surgery  2004 and 2019    x2     SHOULDER ARTHROSCOPY Right 8/28/2019    Procedure: ARTHROSCOPY, SHOULDER;  Surgeon: Stone Suarez MD;  Location: [facility] OR;  Service: Orthopedics;  Laterality: Right;  ARTHREX NOTIFIED    total proctocolectomy  1985    WISDOM TOOTH EXTRACTION         Current Outpatient Medications   Medication Sig    ascorbic acid, vitamin C, (VITAMIN C) 1000 MG tablet     B-complex with vitamin C (Z-BEC OR EQUIV) tablet     calcium-vitamin D  600 mg-10 mcg (400 unit) Tab     cartilage/collagen II/hyaluron (MOVE FREE ULTRA ORAL)     CITICOLINE ORAL     cyanocobalamin, vitamin B-12, (VITAMIN B-12) 2,500 mcg Subl     fluconazole (DIFLUCAN) 200 MG Tab Take 1 tablet (200 mg total) by mouth once a week. for 28 doses    krill-om-3-dha-epa-phospho-ast (KRILL OIL) 689-894-15-75 mg Cap     levocetirizine (XYZAL) 5 MG tablet TAKE 1 TABLET BY MOUTH TWICE A DAY    multivitamin-minerals-lutein Tab     pyridoxine, vitamin B6, (B-6) 100 MG Tab      Current Facility-Administered Medications   Medication    Allergy Mix       Review of patient's allergies indicates:  No Known Allergies    Family History   Problem Relation Age of Onset    Hypertension Mother     Allergic rhinitis Neg Hx     Allergies Neg Hx     Angioedema Neg Hx     Asthma Neg Hx     Eczema Neg Hx     Atopy Neg Hx     Immunodeficiency Neg Hx     Rhinitis Neg Hx     Urticaria Neg Hx        Social History     Socioeconomic History    Marital status:    Tobacco Use    Smoking status: Never    Smokeless tobacco: Never   Substance and Sexual Activity    Alcohol use: No    Drug use: No       Prior to meeting with the patient I reviewed the medical chart in [facility]. This included reviewing the previous progress notes from our office, review of the patient's last appointment with their primary care provider, review of any visits to the emergency room, and review of any pain management appointments or procedures.    History of present illness:  67-year-old male who I saw last in February and prior to that on December 29th, 2022.  When we saw him in February, he had been wearing his helmet elbow strap, he had been doing physical therapy and he was much better in terms of overall range of motion function and pain in the right elbow.    His initial visit in December of last year, he was having an acute exacerbation of his right tennis elbow.  Before seeing him in December of last year, symptoms had been going on  for about a month.  He had been doing conservative measures and this has drastically improved his symptoms.  We injected his right elbow over the lateral epicondyle in December and ordered some physical therapy as well as continue his conservative measures.      He returns today for follow-up evaluation.    Review of Systems:    Constitutional: Negative for chills, fever and weight loss.   HENT: Negative for congestion.    Eyes: Negative for discharge and redness.   Respiratory: Negative for cough and shortness of breath.    Cardiovascular: Negative for chest pain.   Gastrointestinal: Negative for nausea and vomiting.   Musculoskeletal: See HPI.   Skin: Negative for rash.   Neurological: Negative for headaches.   Endo/Heme/Allergies: Does not bruise/bleed easily.   Psychiatric/Behavioral: The patient is not nervous/anxious.    All other systems reviewed and are negative.       Objective:      Physical Examination:    Vital Signs:  There were no vitals filed for this visit.    Body mass index is 24.28 kg/m².    This a well-developed, well nourished patient in no acute distress.  They are alert and oriented and cooperative to examination.     Examination of the right elbow, the skin is dry and intact, no erythema ecchymosis, no signs symptoms of infection.  Range of motion is within normal limits.  He is tender over the lateral epicondyle, he does have pain with resisted wrist extension as well as resisted supination.    Pertinent New Results:        XRAY Report / Interpretation:         Assessment:       1. Lateral epicondylitis of right elbow      Plan:     Lateral epicondylitis of right elbow  -     X-Ray Elbow Complete 3 views Right  -     Tendon Sheath  -     Ambulatory referral/consult to Physical/Occupational Therapy; Future; Expected date: 09/06/2023        Follow up if symptoms worsen or fail to improve.    Lateral epicondylitis, seen previously 1 time injected.  Had a good response to the injection and  physical therapy.  Flared this up doing some yd work recently.  We reinjected him today, we will send him back to physical therapy.  He will follow up with us on an as-needed basis.  If symptoms return, depending on the length of time consider an MRI evaluate the common extensor tendon.  We discussed strap usage, we also discussed lifting with palm up again.      YASHIRA Tejeda, PA-C    This note was created using [employer] voice recognition software that occasionally misinterprets words or phrases.

## 2023-10-11 ENCOUNTER — PATIENT MESSAGE (OUTPATIENT)
Dept: FAMILY MEDICINE | Facility: CLINIC | Age: 68
End: 2023-10-11

## 2023-11-07 ENCOUNTER — PATIENT MESSAGE (OUTPATIENT)
Dept: FAMILY MEDICINE | Facility: CLINIC | Age: 68
End: 2023-11-07
Payer: MEDICARE

## 2023-11-07 ENCOUNTER — PATIENT MESSAGE (OUTPATIENT)
Dept: HEMATOLOGY/ONCOLOGY | Facility: CLINIC | Age: 68
End: 2023-11-07

## 2023-11-09 ENCOUNTER — TELEPHONE (OUTPATIENT)
Dept: HEMATOLOGY/ONCOLOGY | Facility: CLINIC | Age: 68
End: 2023-11-09

## 2023-11-09 ENCOUNTER — LAB VISIT (OUTPATIENT)
Dept: LAB | Facility: HOSPITAL | Age: 68
End: 2023-11-09
Attending: INTERNAL MEDICINE
Payer: MEDICARE

## 2023-11-09 DIAGNOSIS — I82.A11: Primary | ICD-10-CM

## 2023-11-09 DIAGNOSIS — D49.4 BLADDER TUMOR: ICD-10-CM

## 2023-11-09 DIAGNOSIS — R93.5 ABNORMAL MRI OF ABDOMEN: ICD-10-CM

## 2023-11-09 LAB
BUN SERPL-MCNC: 24 MG/DL (ref 8–23)
CREAT SERPL-MCNC: 0.9 MG/DL (ref 0.5–1.4)
EST. GFR  (NO RACE VARIABLE): >60 ML/MIN/1.73 M^2

## 2023-11-09 PROCEDURE — 36415 COLL VENOUS BLD VENIPUNCTURE: CPT | Performed by: INTERNAL MEDICINE

## 2023-11-09 PROCEDURE — 82565 ASSAY OF CREATININE: CPT | Performed by: INTERNAL MEDICINE

## 2023-11-09 PROCEDURE — 84520 ASSAY OF UREA NITROGEN: CPT | Performed by: INTERNAL MEDICINE

## 2023-11-13 ENCOUNTER — HOSPITAL ENCOUNTER (EMERGENCY)
Facility: HOSPITAL | Age: 68
Discharge: HOME | End: 2023-11-13
Attending: EMERGENCY MEDICINE
Payer: MEDICARE

## 2023-11-13 ENCOUNTER — TELEPHONE (OUTPATIENT)
Dept: HEMATOLOGY/ONCOLOGY | Facility: CLINIC | Age: 68
End: 2023-11-13

## 2023-11-13 VITALS
WEIGHT: 178 LBS | TEMPERATURE: 99 F | HEART RATE: 76 BPM | RESPIRATION RATE: 18 BRPM | SYSTOLIC BLOOD PRESSURE: 129 MMHG | BODY MASS INDEX: 24.14 KG/M2 | OXYGEN SATURATION: 96 % | DIASTOLIC BLOOD PRESSURE: 74 MMHG

## 2023-11-13 DIAGNOSIS — R19.7 DIARRHEA OF PRESUMED INFECTIOUS ORIGIN: Primary | ICD-10-CM

## 2023-11-13 LAB
ALBUMIN SERPL BCP-MCNC: 4.2 G/DL (ref 3.5–5.2)
ALP SERPL-CCNC: 71 U/L (ref 55–135)
ALT SERPL W/O P-5'-P-CCNC: 23 U/L (ref 10–44)
ANION GAP SERPL CALC-SCNC: 9 MMOL/L (ref 8–16)
AST SERPL-CCNC: 22 U/L (ref 10–40)
BASOPHILS # BLD AUTO: 0.04 K/UL (ref 0–0.2)
BASOPHILS NFR BLD: 0.3 % (ref 0–1.9)
BILIRUB SERPL-MCNC: 0.8 MG/DL (ref 0.1–1)
BUN SERPL-MCNC: 22 MG/DL (ref 8–23)
CALCIUM SERPL-MCNC: 9.5 MG/DL (ref 8.7–10.5)
CHLORIDE SERPL-SCNC: 107 MMOL/L (ref 95–110)
CO2 SERPL-SCNC: 23 MMOL/L (ref 23–29)
CREAT SERPL-MCNC: 1 MG/DL (ref 0.5–1.4)
DIFFERENTIAL METHOD: ABNORMAL
EOSINOPHIL # BLD AUTO: 0 K/UL (ref 0–0.5)
EOSINOPHIL NFR BLD: 0 % (ref 0–8)
ERYTHROCYTE [DISTWIDTH] IN BLOOD BY AUTOMATED COUNT: 13.2 % (ref 11.5–14.5)
EST. GFR  (NO RACE VARIABLE): >60 ML/MIN/1.73 M^2
GLUCOSE SERPL-MCNC: 133 MG/DL (ref 70–110)
HCT VFR BLD AUTO: 50.2 % (ref 40–54)
HGB BLD-MCNC: 16.6 G/DL (ref 14–18)
IMM GRANULOCYTES # BLD AUTO: 0.09 K/UL (ref 0–0.04)
IMM GRANULOCYTES NFR BLD AUTO: 0.7 % (ref 0–0.5)
LIPASE SERPL-CCNC: 16 U/L (ref 4–60)
LYMPHOCYTES # BLD AUTO: 0.5 K/UL (ref 1–4.8)
LYMPHOCYTES NFR BLD: 3.8 % (ref 18–48)
MCH RBC QN AUTO: 32.5 PG (ref 27–31)
MCHC RBC AUTO-ENTMCNC: 33.1 G/DL (ref 32–36)
MCV RBC AUTO: 98 FL (ref 82–98)
MONOCYTES # BLD AUTO: 0.6 K/UL (ref 0.3–1)
MONOCYTES NFR BLD: 4.3 % (ref 4–15)
NEUTROPHILS # BLD AUTO: 12.3 K/UL (ref 1.8–7.7)
NEUTROPHILS NFR BLD: 90.9 % (ref 38–73)
NRBC BLD-RTO: 0 /100 WBC
PLATELET # BLD AUTO: 285 K/UL (ref 150–450)
PMV BLD AUTO: 9.7 FL (ref 9.2–12.9)
POTASSIUM SERPL-SCNC: 4.5 MMOL/L (ref 3.5–5.1)
PROT SERPL-MCNC: 7.3 G/DL (ref 6–8.4)
RBC # BLD AUTO: 5.1 M/UL (ref 4.6–6.2)
SODIUM SERPL-SCNC: 139 MMOL/L (ref 136–145)
WBC # BLD AUTO: 13.51 K/UL (ref 3.9–12.7)

## 2023-11-13 PROCEDURE — 96361 HYDRATE IV INFUSION ADD-ON: CPT

## 2023-11-13 PROCEDURE — C9113 INJ PANTOPRAZOLE SODIUM, VIA: HCPCS | Performed by: EMERGENCY MEDICINE

## 2023-11-13 PROCEDURE — 80053 COMPREHEN METABOLIC PANEL: CPT | Performed by: EMERGENCY MEDICINE

## 2023-11-13 PROCEDURE — 85025 COMPLETE CBC W/AUTO DIFF WBC: CPT | Performed by: EMERGENCY MEDICINE

## 2023-11-13 PROCEDURE — 25000003 PHARM REV CODE 250: Performed by: EMERGENCY MEDICINE

## 2023-11-13 PROCEDURE — 63600175 PHARM REV CODE 636 W HCPCS: Performed by: EMERGENCY MEDICINE

## 2023-11-13 PROCEDURE — 96374 THER/PROPH/DIAG INJ IV PUSH: CPT

## 2023-11-13 PROCEDURE — 25500020 PHARM REV CODE 255: Performed by: EMERGENCY MEDICINE

## 2023-11-13 PROCEDURE — 83690 ASSAY OF LIPASE: CPT | Performed by: EMERGENCY MEDICINE

## 2023-11-13 PROCEDURE — 99285 EMERGENCY DEPT VISIT HI MDM: CPT | Mod: 25

## 2023-11-13 RX ORDER — SIMETHICONE 80 MG
2 TABLET,CHEWABLE ORAL ONCE
Status: COMPLETED | OUTPATIENT
Start: 2023-11-13 | End: 2023-11-13

## 2023-11-13 RX ORDER — PANTOPRAZOLE SODIUM 40 MG/10ML
40 INJECTION, POWDER, LYOPHILIZED, FOR SOLUTION INTRAVENOUS
Status: COMPLETED | OUTPATIENT
Start: 2023-11-13 | End: 2023-11-13

## 2023-11-13 RX ORDER — CIPROFLOXACIN 500 MG/1
500 TABLET ORAL 2 TIMES DAILY
Qty: 10 TABLET | Refills: 0 | Status: SHIPPED | OUTPATIENT
Start: 2023-11-13 | End: 2023-11-18

## 2023-11-13 RX ADMIN — IOHEXOL 100 ML: 350 INJECTION, SOLUTION INTRAVENOUS at 05:11

## 2023-11-13 RX ADMIN — SODIUM CHLORIDE, SODIUM LACTATE, POTASSIUM CHLORIDE, AND CALCIUM CHLORIDE 1000 ML: .6; .31; .03; .02 INJECTION, SOLUTION INTRAVENOUS at 04:11

## 2023-11-13 RX ADMIN — SIMETHICONE 160 MG: 80 TABLET, CHEWABLE ORAL at 04:11

## 2023-11-13 RX ADMIN — SODIUM CHLORIDE, SODIUM LACTATE, POTASSIUM CHLORIDE, AND CALCIUM CHLORIDE 1000 ML: .6; .31; .03; .02 INJECTION, SOLUTION INTRAVENOUS at 05:11

## 2023-11-13 RX ADMIN — PANTOPRAZOLE SODIUM 40 MG: 40 INJECTION, POWDER, LYOPHILIZED, FOR SOLUTION INTRAVENOUS at 04:11

## 2023-11-13 NOTE — TELEPHONE ENCOUNTER
Attempted to call patient with above to let him know that per IVONE Ragland, patient is okay to get his CT scans completed. No answer at number listed. Voicemail left with above and instructions to call back with any questions.

## 2023-11-13 NOTE — TELEPHONE ENCOUNTER
----- Message from IVONE Ragland sent at 11/10/2023  2:57 PM CST -----  Yes creat and GFR are good.    Niki  ----- Message -----  From: Brandi Branch, RN  Sent: 11/10/2023  11:53 AM CST  To: IVONE Ragland    BUN is 24. He will be having CT with and without contrast. Is he still okay to get his CT scan? BUN normal high end is 23.  ----- Message -----  From: Alla Catalan  Sent: 11/10/2023  11:32 AM CST  To: [facility] Staff    Pt would like a call back about his results on BUN. He said that it is one point above normal and wants to know if it could prevent him from doing CT on Tuesday.     [phone]

## 2023-11-14 NOTE — ED PROVIDER NOTES
Encounter Date: 11/13/2023       History     Chief Complaint   Patient presents with    Diarrhea    Abdominal Pain     Patient presents emergency department with reported multiple episodes of diarrhea patient has a history colectomy 1985 he has had difficulty with dehydration whenever he gets gastrointestinal illness he denies any nausea he denies any noted blood in his stool no fever chills he did eat significant amount of seafood prior to onset of the symptoms there is no current sick contacts at home        Review of patient's allergies indicates:  No Known Allergies  Past Medical History:   Diagnosis Date    Allergic rhinitis     Cancer     skin    DVT (deep vein thrombosis) in pregnancy 2021    right axillary    Hx of seasonal allergies 2000    Hx/of venous embolism and thrombosis of axillary veins, right 10/11/2021    Legionella pneumonia     Lupus anticoagulant positive 12/28/2021 4/6/22 pt states negative    Right shoulder pain 2018    Seborrhea-like dermatitis with psoriasiform elements     Sleep apnea 2017    uses cpap nightly    SVT (supraventricular tachycardia) 2017    Transfusion reaction     hep c-- 1980's & treated     Past Surgical History:   Procedure Laterality Date    APPENDECTOMY      COLON SURGERY      CYSTOSCOPY N/A 7/7/2022    Procedure: CYSTOSCOPY;  Surgeon: Garland Teixeira MD;  Location: [facility] OR;  Service: Urology;  Laterality: N/A;    FLEXIBLE SIGMOIDOSCOPY N/A 4/8/2022    Procedure: SIGMOIDOSCOPY, FLEXIBLE;  Surgeon: Michael Avalos MD;  Location: [facility] ENDO;  Service: Endoscopy;  Laterality: N/A;    LUMBAR DISC SURGERY  2007    pet scan      right shoulder surgery  2004 and 2019    x2     SHOULDER ARTHROSCOPY Right 8/28/2019    Procedure: ARTHROSCOPY, SHOULDER;  Surgeon: Stone Suarez MD;  Location: [facility] OR;  Service: Orthopedics;  Laterality: Right;  ARTHREX NOTIFIED    total proctocolectomy  1985    WISDOM TOOTH EXTRACTION       Family History   Problem Relation Age of Onset     Hypertension Mother     Allergic rhinitis Neg Hx     Allergies Neg Hx     Angioedema Neg Hx     Asthma Neg Hx     Eczema Neg Hx     Atopy Neg Hx     Immunodeficiency Neg Hx     Rhinitis Neg Hx     Urticaria Neg Hx      Social History     Tobacco Use    Smoking status: Never    Smokeless tobacco: Never   Substance Use Topics    Alcohol use: No    Drug use: No     Review of Systems   Constitutional:  Negative for appetite change, chills and fever.   Gastrointestinal:  Positive for abdominal pain and diarrhea. Negative for nausea and vomiting.   All other systems reviewed and are negative.      Physical Exam     Initial Vitals [11/13/23 0401]   BP Pulse Resp Temp SpO2   103/75 93 18 98.7 °F (37.1 °C) 96 %      MAP       --         Physical Exam    Constitutional: He appears well-developed and well-nourished. No distress.   HENT:   Head: Normocephalic and atraumatic.   Right Ear: External ear normal.   Left Ear: External ear normal.   Mouth/Throat: Oropharynx is clear and moist.   Eyes: Pupils are equal, round, and reactive to light.   Neck: Neck supple.   Normal range of motion.  Cardiovascular:  Normal rate, regular rhythm, S1 normal, S2 normal, normal heart sounds and intact distal pulses.           Pulmonary/Chest: Breath sounds normal. No respiratory distress.   Abdominal: Abdomen is soft. There is no abdominal tenderness.   Hyperactive   Musculoskeletal:         General: Normal range of motion.      Cervical back: Normal range of motion and neck supple.     Neurological: He is alert and oriented to person, place, and time. He has normal strength. GCS score is 15. GCS eye subscore is 4. GCS verbal subscore is 5. GCS motor subscore is 6.   Skin: Skin is warm and dry. No rash noted.   Psychiatric: He has a normal mood and affect. His behavior is normal.         ED Course   Procedures  Labs Reviewed   CBC W/ AUTO DIFFERENTIAL - Abnormal; Notable for the following components:       Result Value    WBC 13.51 (*)      MCH 32.5 (*)     Immature Granulocytes 0.7 (*)     Gran # (ANC) 12.3 (*)     Immature Grans (Abs) 0.09 (*)     Lymph # 0.5 (*)     Gran % 90.9 (*)     Lymph % 3.8 (*)     All other components within normal limits   COMPREHENSIVE METABOLIC PANEL - Abnormal; Notable for the following components:    Glucose 133 (*)     All other components within normal limits   LIPASE          Imaging Results              CT Abdomen Pelvis With IV Contrast (Final result)  Result time 11/13/23 05:50:26      Final result by Alec Rodriguez MD (11/13/23 05:50:26)                   Narrative:    Procedure description: CT ABDOMEN PELVIS WITH IV CONTRAST    CLINICAL INDICATION:  Abdominal abscess/infection suspected    TECHNIQUE: Axial imaging obtained through the abdomen and pelvis. Sagittal and coronal reconstructions obtained.    CONTRAST: 100 mL Omnipaque 350    COMPARISON: October 27, 2021    FINDINGS:  CT abdomen/pelvis: The lung bases are clear except for atelectasis. The heart size is normal. No significant pleural or pericardial effusions.    Gallstones in the gallbladder without evidence of inflammation. Stable liver cysts. No follow-up imaging recommended. The portal vein is normal.    The spleen, both adrenal glands and pancreas are normal.    Multiple simple cysts of the kidneys for which no follow-up imaging is recommended.    Aorta is mildly atherosclerotic and normal size without dissection.    The prostate gland is mildly enlarged. The bladder is mildly thickened.    The colon has apparently been resected with ileorectal anastomosis. The distal small bowel appears mildly thickened but the appearance is stable from 2021 with no surrounding inflammation. Fluid is present to the distal small bowel with no formed stool consistent with absence of the colon.    There is mildly increased density throughout the mesentery which is similar to previous exam suggesting chronic scarring.    No free air, free fluid, masses or  adenopathy are demonstrated.    Numerous surgical clips are present in the abdomen and pelvis.      IMPRESSION:  1: Colonic resection with ileorectal anastomosis.  2: Cholelithiasis. No evidence of acute cholecystitis.  3: Stable liver and renal cysts.  4: Prostate gland enlargement.  5: Difficult to exclude any small bowel enteritis with diffuse fluid-filled small bowel.  6: Stranding in the mesentery previously described is stable since 2021 consistent with chronic scarring.      RADIATION DOSE REDUCTION: This exam was performed according to our departmental dose-optimization program which includes automated exposure control, adjustment of the mA and/or kV according to patient size and/or use of iterative reconstruction technique.    Electronically signed by:  Alec Rodriguez MD  11/13/2023 05:50 AM CST Workstation: [id number]                                     Medications   lactated ringers bolus 1,000 mL (0 mLs Intravenous Stopped 11/13/23 0540)   pantoprazole injection 40 mg (40 mg Intravenous Given 11/13/23 0440)   simethicone chewable tablet 160 mg (160 mg Oral Given 11/13/23 0440)   iohexoL (OMNIPAQUE 350) injection 100 mL (100 mLs Intravenous Given 11/13/23 0535)   lactated ringers bolus 1,000 mL (0 mLs Intravenous Stopped 11/13/23 0640)     Medical Decision Making  IV fluids administered emergency department with improvement patient's symptoms he has voided in the emergency department given his history and recent consumption of seafood will place patient on Cipro case this is a food-borne enteritis CT scan was obtained showed evidence of enteritis but no other abnormalities was least with recommendations for outpatient follow-up return to ER for any worsened symptoms or new symptoms    Amount and/or Complexity of Data Reviewed  Labs: ordered. Decision-making details documented in ED Course.  Radiology: ordered. Decision-making details documented in ED Course.    Risk  OTC drugs.  Prescription drug  management.                               Clinical Impression:   Final diagnoses:  [R19.7] Diarrhea of presumed infectious origin (Primary)        ED Disposition Condition    Discharge Stable          ED Prescriptions       Medication Sig Dispense Start Date End Date Auth. Provider    ciprofloxacin HCl (CIPRO) 500 MG tablet Take 1 tablet (500 mg total) by mouth 2 (two) times daily. for 5 days 10 tablet 11/13/2023 11/18/2023 Scott Storey MD          Follow-up Information    None          Scott Storey MD  11/13/23 0022

## 2023-12-05 NOTE — PROGRESS NOTES
"[facility] Hematology/Oncology  PROGRESS NOTE -  Follow-up Visit      Subjective:       Patient ID:   NAME: Faisal Quiros : 1955     68 y.o. male    Referring Doc: Milan Chen III, *  Other Physicians: Keith Duenas Albright; Goyo Lui           Chief Complaint: Rght axillary vein clot f/u     History of Present Illness:     Patient returns today for a regularly scheduled follow-up visit.  The patient is here today to go over the results of the recently ordered labs, tests and studies. He is here by himself.     He is feeling "good". Breathing ok. No excessive bleeding or bruising.    He had CT 2023 in ED when he went for stomach bug    He recent repeat scope with Dr Teixeira and he is now on a 6 month schedule; he previously had a low grade bladder cancer in 2022      He previously had seen Dr Avalos on 2022 and had scope which was clear. And rthe plan is for a 3 yr repeat      He saw Dr Chen in  2023    Discussed covid 19 precautions - s/p vaccinations          ROS:   GEN: normal without any fever, night sweats or weight loss  HEENT: normal with no HA's, sore throat, stiff neck, changes in vision  CV: normal with no CP, SOB, PND, MAK or orthopnea  PULM: normal with no SOB, cough, hemoptysis, sputum or pleuritic pain  GI: normal with no abdominal pain, nausea, vomiting, constipation, diarrhea, melanotic stools, BRBPR, or hematemesis  : normal with no hematuria, dysuria  BREAST: normal with no mass, discharge, pain  SKIN: normal with no rash, erythema, bruising, or swelling    Pain Scale:  0    Allergies:  Review of patient's allergies indicates:  No Known Allergies    Medications:    Current Outpatient Medications:     ascorbic acid, vitamin C, (VITAMIN C) 1000 MG tablet, , Disp: , Rfl:     B-complex with vitamin C (Z-BEC OR EQUIV) tablet, , Disp: , Rfl:     calcium-vitamin D 600 mg-10 mcg (400 unit) Tab, , Disp: , Rfl:     cartilage/collagen II/hyaluron (MOVE FREE " ULTRA ORAL), , Disp: , Rfl:     CITICOLINE ORAL, , Disp: , Rfl:     cyanocobalamin, vitamin B-12, (VITAMIN B-12) 2,500 mcg Subl, , Disp: , Rfl:     fluconazole (DIFLUCAN) 200 MG Tab, Take 1 tablet (200 mg total) by mouth once a week. for 28 doses, Disp: 28 tablet, Rfl: 0    krill-om-3-dha-epa-phospho-ast (KRILL OIL) 060-529-09-75 mg Cap, , Disp: , Rfl:     levocetirizine (XYZAL) 5 MG tablet, TAKE 1 TABLET BY MOUTH TWICE A DAY, Disp: 180 tablet, Rfl: 1    multivitamin-minerals-lutein Tab, , Disp: , Rfl:     pyridoxine, vitamin B6, (B-6) 100 MG Tab, , Disp: , Rfl:     Current Facility-Administered Medications:     Allergy Mix, , Subcutaneous, Q30 Days, Karen Parker MD, Given at 07/13/22 0821    PMHx/PSHx Updates:  See patient's last visit with me on 11/17/2022.  See H&P on 11/24/2021        Pathology:   Cancer Staging   No matching staging information was found for the patient.    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   - NO STROMAL INVASION IS PRESENT.   - MUSCULARIS PROPRIA (DETRUSOR MUSCLE) IS NOT REPRESENTED.         Objective:     Vitals:  Blood pressure 129/75, pulse 70, temperature 97.1 °F (36.2 °C), resp. rate 16, weight 82.1 kg (181 lb).    Physical Examination:   GEN: no apparent distress, comfortable; AAOx3  HEAD: atraumatic and normocephalic  EYES: no pallor, no icterus, PERRLA  ENT: OMM, no pharyngeal erythema, external ears WNL; no nasal discharge; no thrush  NECK: no masses, thyroid normal, trachea midline, no LAD/LN's, supple  CV: RRR with no murmur; normal pulse; normal S1 and S2; no pedal edema  CHEST: Normal respiratory effort; CTAB; normal breath sounds; no wheeze or crackles  ABDOM: nontender and nondistended; soft; normal bowel sounds; no rebound/guarding  MUSC/Skeletal: ROM normal; no crepitus; joints normal; no deformities or arthropathy  EXTREM: no clubbing, cyanosis, inflammation or  swelling  SKIN: no rashes, lesions, ulcers, petechiae or subcutaneous nodules  : no casarez  NEURO: grossly intact; motor/sensory WNL; AAOx3; no tremors  PSYCH: normal mood, affect and behavior  LYMPH: normal cervical, supraclavicular, axillary and groin LN's            Labs:     Lab Results   Component Value Date    WBC 13.51 (H) 11/13/2023    HGB 16.6 11/13/2023    HCT 50.2 11/13/2023    MCV 98 11/13/2023     11/13/2023       CMP  Sodium   Date Value Ref Range Status   11/13/2023 139 136 - 145 mmol/L Final   10/22/2018 142 134 - 144 mmol/L      Potassium   Date Value Ref Range Status   11/13/2023 4.5 3.5 - 5.1 mmol/L Final     Chloride   Date Value Ref Range Status   11/13/2023 107 95 - 110 mmol/L Final   10/22/2018 107 98 - 110 mmol/L      CO2   Date Value Ref Range Status   11/13/2023 23 23 - 29 mmol/L Final     Glucose   Date Value Ref Range Status   11/13/2023 133 (H) 70 - 110 mg/dL Final   10/22/2018 100 (H) 70 - 99 mg/dL      BUN   Date Value Ref Range Status   11/13/2023 22 8 - 23 mg/dL Final     Creatinine   Date Value Ref Range Status   11/13/2023 1.0 0.5 - 1.4 mg/dL Final   10/22/2018 0.75 0.60 - 1.40 mg/dL      Calcium   Date Value Ref Range Status   11/13/2023 9.5 8.7 - 10.5 mg/dL Final     Total Protein   Date Value Ref Range Status   11/13/2023 7.3 6.0 - 8.4 g/dL Final     Albumin   Date Value Ref Range Status   11/13/2023 4.2 3.5 - 5.2 g/dL Final   10/22/2018 4.0 3.1 - 4.7 g/dL      Total Bilirubin   Date Value Ref Range Status   11/13/2023 0.8 0.1 - 1.0 mg/dL Final     Comment:     For infants and newborns, interpretation of results should be based  on gestational age, weight and in agreement with clinical  observations.    Premature Infant recommended reference ranges:  Up to 24 hours.............<8.0 mg/dL  Up to 48 hours............<12.0 mg/dL  3-5 days..................<15.0 mg/dL  6-29 days.................<15.0 mg/dL       Alkaline Phosphatase   Date Value Ref Range Status   11/13/2023  71 55 - 135 U/L Final     AST   Date Value Ref Range Status   11/13/2023 22 10 - 40 U/L Final     ALT   Date Value Ref Range Status   11/13/2023 23 10 - 44 U/L Final     Anion Gap   Date Value Ref Range Status   11/13/2023 9 8 - 16 mmol/L Final     eGFR if    Date Value Ref Range Status   06/22/2022 >60.0 >60 mL/min/1.73 m^2 Final     eGFR if non    Date Value Ref Range Status   06/22/2022 >60.0 >60 mL/min/1.73 m^2 Final     Comment:     Calculation used to obtain the estimated glomerular filtration  rate (eGFR) is the CKD-EPI equation.                         Radiology/Diagnostic Studies:    CT Abdom/pelvis 11/13/2023:    IMPRESSION:  1: Colonic resection with ileorectal anastomosis.  2: Cholelithiasis. No evidence of acute cholecystitis.  3: Stable liver and renal cysts.  4: Prostate gland enlargement.  5: Difficult to exclude any small bowel enteritis with diffuse fluid-filled small bowel.  6: Stranding in the mesentery previously described is stable since 2021 consistent with chronic scarring.             CT Chest With Contrast    Result Date: 11/30/2021  CMS MANDATED QUALITY DATA - CT RADIATION 436 All CT scans at this facility utilize dose modulation, iterative reconstruction, and/or weight based dosing when appropriate to reduce radiation dose to as low as reasonably achievable. CLINICAL HISTORY: 66 years (1955) Male clot TECHNIQUE: CT CHEST WITH IV CONTRAST. 342 images obtained. Axial CT images of the chest were obtained after the uneventful administration of IV contrast. Soft tissue and lung windows were sent for review. Sagittal and coronal reformats were performed by the radiologist. CONTRAST: 100 mL of IV Omni 350 was administered uneventfully by IV COMPARISON: Ultrasound from 8/10/2021 FINDINGS:. Visualized neck: Within normal limits. Lungs: The lungs are essentially clear with no concerning pulmonary nodule or mass. There is a 6 mm calcified granuloma in the  central left upper lobe (image 64). Airway: The trachea and central bronchial tree appear normal. Pleura: There is no pleural effusion. There is no pneumothorax. Cardiovascular: The heart is normal in size. There is no pericardial effusion. The thoracic aorta and great vessels are normal in course and caliber. Note that the contrast bolus timing is not targeted for evaluation of pulmonary embolus or deep venous thrombosis in the upper extremities. Mediastinum: There is no supraclavicular, axillary, mediastinal, or hilar lymphadenopathy. Soft tissues: The peripheral soft tissues appear normal. Musculoskeletal: There are moderate degenerative changes of the thoracic spine.  There are no suspicious osseous lesions. Esophagus: The esophagus appears grossly normal. Upper Abdomen: No acute abnormality is seen in the upper abdomen. Relative diffuse low-attenuation liver in keeping with steatosis. Rounded low-attenuation structures are seen in the liver, incompletely assessed on this nontargeted exam, but suggestive of cysts. Partially imaged is a 5.5 cm simple fluid attenuation structure in the left kidney favored to represent a cyst. IMPRESSION: 1. No acute cardiac or pulmonary process. 2. No concerning pulmonary nodule, mass or lymphadenopathy to suggest malignancy. 3. Additional, and incidental findings as outlined above. . Electronically signed by:  Manfred Silva MD  11/30/2021 10:37 AM CST Workstation: [id number]      CT Abdom/pelvis 10/22/2021:     IMPRESSION:     1.  Patchy haziness of the central mesenteric fat with multiple shoddy lymph nodes a similar appearance compared with exam from 2017. 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  2.  Postsurgical changes of colectomy noted.  3.  Multiple hepatic and renal cysts.  4.  Mild prostatomegaly.     US Abdom  10/19/2021:     IMPRESSION:     1.  Cholelithiasis.  2.  Mild bilateral caliectasis versus hydronephrosis.  3.  Bilateral simple renal cysts.  4.   Septated hepatic cyst in the right liver lobe.        US Upper extremity:  10/8/2021     IMPRESSION:  Deep venous thrombosis in the right upper extremity at the level of the axillary vein       I have reviewed all available lab results and radiology reports.    Assessment/Plan:   (1) 68 y.o. male with diagnosis of right axillary vein clot who has been referred by Milan Chen III, * for evaluation by medical hematology/oncology.     -He is on xarelto 20mg po daily. He developed a chord under the right arm/axilla and noticed it while taking a shower. He reports that he did not have any pain at first. He saw his PCP and had US which showed a clot.  -  No prior personal or family history of clots.   - His dad  of stroke but he was of advanced age.   - no excessive bleeding or bruising     2021:  - MTHFR-C heterozygous positive with normal homocysteine  - LA positive but he is on xarelto  - discussed the genetic implications of the MTHFR  - arm swelling resolved  - discontinue xarelto  - repeat LA in 2-3 months  - folbic and baby aspirin on full stomach    2022:  - repeat LA was negative  - now off xarelto since Dec 2021  - continue on the folbic or folbic-like supplements    2022:  - no recent swelling or bruising/bleeding  - repeat homocystiene is good  - continue on his vitamin supplements  - check B12/folate with next labs due to mild macrocytosis without any current anemia    2023:  - no excessive bleeding or bruising  - homocystine low at 6.7  - on MVI    2023:  - no excessive bleeding or bruising  - hgb adequate  - continued on MVI  - He saw Dr Chen in  2023        (2) Hx/of Hep C in the  and had Ribavarin and INF  - he required numerous blood transfusion in the '80s due to his UC     (3) Asthma     (4) 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 And rthe plan is for a 3 yr repeat     (9) Allergy/Hives issues - on injections with Dr Parker    (10) Low grade bladder ca s/p excision  - He was recently found to have low grade bladder cancer in July 2022 with planned repeat scopes 4x per year and for which he is followed by Dr Teixeira    6/1/2023:  - He recent repeat scope with Dr Teixeira; he had increase in his PSA and ended up having MRI of the prostate; he previously had a low grade bladder cancer in July 2022 . Possible prostatitis and treated with cipro po abx    12/6/2023:  - He had CT 11/13/2023 in ED when he went for stomach bug  - He recent repeat scope with Dr Teixeira and he is now on a 6 month schedule; he previously had a low grade bladder cancer in July 2022              VISIT DIAGNOSES:      Bladder tumor    Hx/of venous embolism and thrombosis of axillary veins, right    MTHFR mutation          PLAN:  1. F/u with PCP as directed  2. Continue folbic or folbic equivalent supplements  4. Check homocysteine level every 6 months  5. Continue urologic care and observation with Dr Teixeira  6. F/u with GI  7.  F/u with PCP, Pulm, GI,  etc    RTC in 6 months  Fax note to Milan Chen III, *; Keith Avalos Schuette; Lluvia    Discussion:     COVID-19 Discussion:     I had long discussion with patient and any applicable family about the COVID-19 coronavirus epidemic and the recommended precautions with regard to cancer and/or hematology patients. I have re-iterated the CDC recommendations for adequate hand washing, use of hand -like products, and coughing into elbow, etc. In addition, especially for our patients who are on chemotherapy and/or our otherwise immunocompromised patients, I have recommended avoidance of crowds, including movie theaters,  restaurants, churches, etc. I have recommended avoidance of any sick or symptomatic family members and/or friends. I have also recommended avoidance of any raw and unwashed food products, and general avoidance of food items that have not been prepared by themselves. The patient has been asked to call us immediately with any symptom developments, issues, questions or other general concerns.      Anticoagulation Discussion:     Discussed with patient and any applicable family members about the benefit and/or need for anticoagulation. I communicated about the risks of bleeding while on any anticoagulation, which could be serious and/or life-threatening, and which can occur at any time, regardless of degree of the level of anticoagulation. I expressed the need for compliance with any anticoagulation regimen and that failure to do so could potential lead to excessive bleeding, and risk to health and/or life. In particular, with patients on coumadin therapy, compliance with requested blood work is absolutely essential, as coumadin levels can vary from time to time, and failure to do so could potentially place the patient at risk for bleeding and/or clotting events which could be fatal. Patients on coumadin are encouraged to call the day after they have their levels drawn, as to obtain the appropriate instructions from my staff. Patients are aware that self-regulating or self-dosing of their medications is strictly prohibited.      I spent over 25 mins of time with the patient. Reviewed results of the recently ordered labs, tests and studies; made directives with regards to the results. Over half of this time was spent couseling and coordinating care.    I have explained all of the above in detail and the patient understands all of the current recommendation(s). I have answered all of their questions to the best of my ability and to their complete satisfaction.   The patient is to continue with the current management  plan.            Electronically signed by Ronen Blackman MD              Answers submitted by the patient for this visit:  Review of Systems Questionnaire (Submitted on 12/4/2023)  appetite change : No  unexpected weight change: No  mouth sores: No  visual disturbance: No  cough: No  shortness of breath: No  chest pain: No  abdominal pain: Yes  diarrhea: Yes  frequency: No  back pain: No  rash: No  headaches: No  adenopathy: No  nervous/ anxious: No

## 2023-12-06 ENCOUNTER — OFFICE VISIT (OUTPATIENT)
Dept: HEMATOLOGY/ONCOLOGY | Facility: CLINIC | Age: 68
End: 2023-12-06
Payer: MEDICARE

## 2023-12-06 VITALS
HEART RATE: 70 BPM | RESPIRATION RATE: 16 BRPM | DIASTOLIC BLOOD PRESSURE: 75 MMHG | BODY MASS INDEX: 24.55 KG/M2 | WEIGHT: 181 LBS | TEMPERATURE: 97 F | SYSTOLIC BLOOD PRESSURE: 129 MMHG

## 2023-12-06 DIAGNOSIS — I82.A11: ICD-10-CM

## 2023-12-06 DIAGNOSIS — Z15.89 MTHFR MUTATION: ICD-10-CM

## 2023-12-06 DIAGNOSIS — D49.4 BLADDER TUMOR: Primary | ICD-10-CM

## 2023-12-06 PROCEDURE — 3074F PR MOST RECENT SYSTOLIC BLOOD PRESSURE < 130 MM HG: ICD-10-PCS | Mod: CPTII,S$GLB,, | Performed by: INTERNAL MEDICINE

## 2023-12-06 PROCEDURE — 3288F FALL RISK ASSESSMENT DOCD: CPT | Mod: CPTII,S$GLB,, | Performed by: INTERNAL MEDICINE

## 2023-12-06 PROCEDURE — 1159F PR MEDICATION LIST DOCUMENTED IN MEDICAL RECORD: ICD-10-PCS | Mod: CPTII,S$GLB,, | Performed by: INTERNAL MEDICINE

## 2023-12-06 PROCEDURE — 1101F PR PT FALLS ASSESS DOC 0-1 FALLS W/OUT INJ PAST YR: ICD-10-PCS | Mod: CPTII,S$GLB,, | Performed by: INTERNAL MEDICINE

## 2023-12-06 PROCEDURE — 3008F BODY MASS INDEX DOCD: CPT | Mod: CPTII,S$GLB,, | Performed by: INTERNAL MEDICINE

## 2023-12-06 PROCEDURE — 1126F PR PAIN SEVERITY QUANTIFIED, NO PAIN PRESENT: ICD-10-PCS | Mod: CPTII,S$GLB,, | Performed by: INTERNAL MEDICINE

## 2023-12-06 PROCEDURE — 1101F PT FALLS ASSESS-DOCD LE1/YR: CPT | Mod: CPTII,S$GLB,, | Performed by: INTERNAL MEDICINE

## 2023-12-06 PROCEDURE — 1160F RVW MEDS BY RX/DR IN RCRD: CPT | Mod: CPTII,S$GLB,, | Performed by: INTERNAL MEDICINE

## 2023-12-06 PROCEDURE — 3078F DIAST BP <80 MM HG: CPT | Mod: CPTII,S$GLB,, | Performed by: INTERNAL MEDICINE

## 2023-12-06 PROCEDURE — 99213 OFFICE O/P EST LOW 20 MIN: CPT | Mod: S$GLB,,, | Performed by: INTERNAL MEDICINE

## 2023-12-06 PROCEDURE — 1160F PR REVIEW ALL MEDS BY PRESCRIBER/CLIN PHARMACIST DOCUMENTED: ICD-10-PCS | Mod: CPTII,S$GLB,, | Performed by: INTERNAL MEDICINE

## 2023-12-06 PROCEDURE — 3008F PR BODY MASS INDEX (BMI) DOCUMENTED: ICD-10-PCS | Mod: CPTII,S$GLB,, | Performed by: INTERNAL MEDICINE

## 2023-12-06 PROCEDURE — 99213 PR OFFICE/OUTPT VISIT, EST, LEVL III, 20-29 MIN: ICD-10-PCS | Mod: S$GLB,,, | Performed by: INTERNAL MEDICINE

## 2023-12-06 PROCEDURE — 3078F PR MOST RECENT DIASTOLIC BLOOD PRESSURE < 80 MM HG: ICD-10-PCS | Mod: CPTII,S$GLB,, | Performed by: INTERNAL MEDICINE

## 2023-12-06 PROCEDURE — 1159F MED LIST DOCD IN RCRD: CPT | Mod: CPTII,S$GLB,, | Performed by: INTERNAL MEDICINE

## 2023-12-06 PROCEDURE — 3288F PR FALLS RISK ASSESSMENT DOCUMENTED: ICD-10-PCS | Mod: CPTII,S$GLB,, | Performed by: INTERNAL MEDICINE

## 2023-12-06 PROCEDURE — 3074F SYST BP LT 130 MM HG: CPT | Mod: CPTII,S$GLB,, | Performed by: INTERNAL MEDICINE

## 2023-12-06 PROCEDURE — 1126F AMNT PAIN NOTED NONE PRSNT: CPT | Mod: CPTII,S$GLB,, | Performed by: INTERNAL MEDICINE

## 2024-05-24 ENCOUNTER — TELEPHONE (OUTPATIENT)
Facility: CLINIC | Age: 69
End: 2024-05-24
Payer: MEDICARE

## 2024-05-24 DIAGNOSIS — D53.9 NUTRITIONAL ANEMIA, UNSPECIFIED: ICD-10-CM

## 2024-05-24 DIAGNOSIS — Z15.89 MTHFR MUTATION: Primary | ICD-10-CM

## 2024-05-27 ENCOUNTER — PATIENT MESSAGE (OUTPATIENT)
Dept: FAMILY MEDICINE | Facility: CLINIC | Age: 69
End: 2024-05-27
Payer: MEDICARE

## 2024-05-27 DIAGNOSIS — Z12.5 SCREENING PSA (PROSTATE SPECIFIC ANTIGEN): ICD-10-CM

## 2024-05-27 DIAGNOSIS — E78.5 HYPERLIPIDEMIA, UNSPECIFIED HYPERLIPIDEMIA TYPE: ICD-10-CM

## 2024-05-27 DIAGNOSIS — Z00.00 PHYSICAL EXAM: Primary | ICD-10-CM

## 2024-05-31 LAB
ALBUMIN SERPL-MCNC: 4 G/DL (ref 3.9–4.9)
ALBUMIN/GLOB SERPL: 1.7 {RATIO} (ref 1.2–2.2)
ALP SERPL-CCNC: 81 IU/L (ref 44–121)
ALT SERPL-CCNC: 23 IU/L (ref 0–44)
AST SERPL-CCNC: 25 IU/L (ref 0–40)
BASOPHILS # BLD AUTO: 0 X10E3/UL (ref 0–0.2)
BASOPHILS NFR BLD AUTO: 1 %
BILIRUB SERPL-MCNC: 0.4 MG/DL (ref 0–1.2)
BUN SERPL-MCNC: 14 MG/DL (ref 8–27)
BUN/CREAT SERPL: 16 (ref 10–24)
CALCIUM SERPL-MCNC: 8.8 MG/DL (ref 8.6–10.2)
CHLORIDE SERPL-SCNC: 103 MMOL/L (ref 96–106)
CHOLEST SERPL-MCNC: 160 MG/DL (ref 100–199)
CO2 SERPL-SCNC: 26 MMOL/L (ref 20–29)
CREAT SERPL-MCNC: 0.85 MG/DL (ref 0.76–1.27)
EOSINOPHIL # BLD AUTO: 0.1 X10E3/UL (ref 0–0.4)
EOSINOPHIL NFR BLD AUTO: 1 %
ERYTHROCYTE [DISTWIDTH] IN BLOOD BY AUTOMATED COUNT: 12.1 % (ref 11.6–15.4)
EST. GFR  (NO RACE VARIABLE): 95 ML/MIN/1.73
GLOBULIN SER CALC-MCNC: 2.3 G/DL (ref 1.5–4.5)
GLUCOSE SERPL-MCNC: 88 MG/DL (ref 70–99)
HCT VFR BLD AUTO: 42.5 % (ref 37.5–51)
HDLC SERPL-MCNC: 54 MG/DL
HGB BLD-MCNC: 14.2 G/DL (ref 13–17.7)
IMM GRANULOCYTES # BLD AUTO: 0 X10E3/UL (ref 0–0.1)
IMM GRANULOCYTES NFR BLD AUTO: 0 %
LDLC SERPL CALC-MCNC: 86 MG/DL (ref 0–99)
LYMPHOCYTES # BLD AUTO: 1.8 X10E3/UL (ref 0.7–3.1)
LYMPHOCYTES NFR BLD AUTO: 24 %
MCH RBC QN AUTO: 32.7 PG (ref 26.6–33)
MCHC RBC AUTO-ENTMCNC: 33.4 G/DL (ref 31.5–35.7)
MCV RBC AUTO: 98 FL (ref 79–97)
MONOCYTES # BLD AUTO: 0.5 X10E3/UL (ref 0.1–0.9)
MONOCYTES NFR BLD AUTO: 6 %
NEUTROPHILS # BLD AUTO: 5.1 X10E3/UL (ref 1.4–7)
NEUTROPHILS NFR BLD AUTO: 68 %
PLATELET # BLD AUTO: 260 X10E3/UL (ref 150–450)
POTASSIUM SERPL-SCNC: 3.7 MMOL/L (ref 3.5–5.2)
PROT SERPL-MCNC: 6.3 G/DL (ref 6–8.5)
PSA SERPL-MCNC: 2 NG/ML (ref 0–4)
RBC # BLD AUTO: 4.34 X10E6/UL (ref 4.14–5.8)
SODIUM SERPL-SCNC: 140 MMOL/L (ref 134–144)
TRIGL SERPL-MCNC: 108 MG/DL (ref 0–149)
VLDLC SERPL CALC-MCNC: 20 MG/DL (ref 5–40)
WBC # BLD AUTO: 7.6 X10E3/UL (ref 3.4–10.8)

## 2024-06-03 ENCOUNTER — TELEPHONE (OUTPATIENT)
Facility: CLINIC | Age: 69
End: 2024-06-03
Payer: MEDICARE

## 2024-06-03 NOTE — TELEPHONE ENCOUNTER
I spoke with pt and reminded him to have labs done prior to appt here on 6/10/24 pt verbalized understanding.

## 2024-06-06 LAB
FOLATE SERPL-MCNC: >20 NG/ML
HCYS SERPL-SCNC: 8.1 UMOL/L (ref 0–17.2)
VIT B12 SERPL-MCNC: 1168 PG/ML (ref 232–1245)

## 2024-06-06 NOTE — PROGRESS NOTES
"[facility] Hematology/Oncology  PROGRESS NOTE -  Follow-up Visit      Subjective:       Patient ID:   NAME: Faisal Quiros : 1955     68 y.o. male    Referring Doc: Milan Chen III, *  Other Physicians: Keith Duenas Albright; Goyo Lui           Chief Complaint: Rght axillary vein clot f/u     History of Present Illness:     Patient returns today for a regularly scheduled follow-up visit.  The patient is here today to go over the results of the recently ordered labs, tests and studies. He is here by himself.     He is feeling "good". Breathing ok. No excessive bleeding or bruising.    He had his esophagus dilated in early May 2024 with Dr Avalos; he is now on protonix      He sees Dr Teixeira and he is now on a 6 month schedule; he previously had a low grade bladder cancer in 2022; latest PSA 2.0    He last saw Dr Chen in  2023    Discussed covid 19 precautions - s/p vaccinations          ROS:   GEN: normal without any fever, night sweats or weight loss  HEENT: normal with no HA's, sore throat, stiff neck, changes in vision  CV: normal with no CP, SOB, PND, MAK or orthopnea  PULM: normal with no SOB, cough, hemoptysis, sputum or pleuritic pain  GI: normal with no abdominal pain, nausea, vomiting, constipation, diarrhea, melanotic stools, BRBPR, or hematemesis  : normal with no hematuria, dysuria  BREAST: normal with no mass, discharge, pain  SKIN: normal with no rash, erythema, bruising, or swelling    Pain Scale:  0    Allergies:  Review of patient's allergies indicates:  No Known Allergies    Medications:    Current Outpatient Medications:     ascorbic acid, vitamin C, (VITAMIN C) 1000 MG tablet, , Disp: , Rfl:     B-complex with vitamin C (Z-BEC OR EQUIV) tablet, , Disp: , Rfl:     calcium-vitamin D 600 mg-10 mcg (400 unit) Tab, , Disp: , Rfl:     cartilage/collagen II/hyaluron (MOVE FREE ULTRA ORAL), , Disp: , Rfl:     CITICOLINE ORAL, , Disp: , Rfl:     cyanocobalamin, " vitamin B-12, (VITAMIN B-12) 2,500 mcg Subl, , Disp: , Rfl:     krill-om-3-dha-epa-phospho-ast (KRILL OIL) 999-014-95-75 mg Cap, , Disp: , Rfl:     levocetirizine (XYZAL) 5 MG tablet, TAKE 1 TABLET BY MOUTH TWICE A DAY, Disp: 180 tablet, Rfl: 1    multivitamin-minerals-lutein Tab, , Disp: , Rfl:     pantoprazole (PROTONIX) 20 MG tablet, , Disp: , Rfl:     pyridoxine, vitamin B6, (B-6) 100 MG Tab, , Disp: , Rfl:     Current Facility-Administered Medications:     Allergy Mix, , Subcutaneous, Q30 Days, Karen Parker MD, Given at 07/13/22 0821    PMHx/PSHx Updates:  See patient's last visit with me on 12/6/2022.  See H&P on 11/24/2021        Pathology:   Cancer Staging   No matching staging information was found for the patient.      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   - NO STROMAL INVASION IS PRESENT.   - MUSCULARIS PROPRIA (DETRUSOR MUSCLE) IS NOT REPRESENTED.         Objective:     Vitals:  Blood pressure (!) 144/83, pulse 75, temperature 97.7 °F (36.5 °C), temperature source Temporal, height 6' (1.829 m), weight 81.2 kg (179 lb 1.6 oz).    Physical Examination:   GEN: no apparent distress, comfortable; AAOx3  HEAD: atraumatic and normocephalic  EYES: no pallor, no icterus, PERRLA  ENT: OMM, no pharyngeal erythema, external ears WNL; no nasal discharge; no thrush  NECK: no masses, thyroid normal, trachea midline, no LAD/LN's, supple  CV: RRR with no murmur; normal pulse; normal S1 and S2; no pedal edema  CHEST: Normal respiratory effort; CTAB; normal breath sounds; no wheeze or crackles  ABDOM: nontender and nondistended; soft; normal bowel sounds; no rebound/guarding  MUSC/Skeletal: ROM normal; no crepitus; joints normal; no deformities or arthropathy  EXTREM: no clubbing, cyanosis, inflammation or swelling  SKIN: no rashes, lesions, ulcers, petechiae or subcutaneous nodules  : no casarez  NEURO: grossly  30y/o  PPD#1 s/p  in stable condition. intact; motor/sensory WNL; AAOx3; no tremors  PSYCH: normal mood, affect and behavior  LYMPH: normal cervical, supraclavicular, axillary and groin LN's            Labs:     Lab Results   Component Value Date    WBC 7.6 05/30/2024    HGB 14.2 05/30/2024    HCT 42.5 05/30/2024    MCV 98 (H) 05/30/2024     05/30/2024       CMP  Sodium   Date Value Ref Range Status   05/30/2024 140 134 - 144 mmol/L Final   10/22/2018 142 134 - 144 mmol/L      Potassium   Date Value Ref Range Status   05/30/2024 3.7 3.5 - 5.2 mmol/L Final     Chloride   Date Value Ref Range Status   05/30/2024 103 96 - 106 mmol/L Final   10/22/2018 107 98 - 110 mmol/L      CO2   Date Value Ref Range Status   05/30/2024 26 20 - 29 mmol/L Final     Glucose   Date Value Ref Range Status   05/30/2024 88 70 - 99 mg/dL Final   10/22/2018 100 (H) 70 - 99 mg/dL      BUN   Date Value Ref Range Status   05/30/2024 14 8 - 27 mg/dL Final     Creatinine   Date Value Ref Range Status   05/30/2024 0.85 0.76 - 1.27 mg/dL Final   10/22/2018 0.75 0.60 - 1.40 mg/dL      Calcium   Date Value Ref Range Status   05/30/2024 8.8 8.6 - 10.2 mg/dL Final     Total Protein   Date Value Ref Range Status   11/13/2023 7.3 6.0 - 8.4 g/dL Final     Albumin   Date Value Ref Range Status   05/30/2024 4.0 3.9 - 4.9 g/dL Final   11/13/2023 4.2 3.5 - 5.2 g/dL Final   10/22/2018 4.0 3.1 - 4.7 g/dL      Total Bilirubin   Date Value Ref Range Status   05/30/2024 0.4 0.0 - 1.2 mg/dL Final     Alkaline Phosphatase   Date Value Ref Range Status   11/13/2023 71 55 - 135 U/L Final     AST   Date Value Ref Range Status   05/30/2024 25 0 - 40 IU/L Final     ALT   Date Value Ref Range Status   05/30/2024 23 0 - 44 IU/L Final     Anion Gap   Date Value Ref Range Status   11/13/2023 9 8 - 16 mmol/L Final     eGFR if    Date Value Ref Range Status   06/22/2022 >60.0 >60 mL/min/1.73 m^2 Final     eGFR if non    Date Value Ref Range Status   06/22/2022 >60.0 >60  mL/min/1.73 m^2 Final     Comment:     Calculation used to obtain the estimated glomerular filtration  rate (eGFR) is the CKD-EPI equation.           Lab Results   Component Value Date    TYFGCQUZ74 1,168 06/05/2024     Lab Results   Component Value Date    FOLATE >20.0 06/05/2024                     Radiology/Diagnostic Studies:    CT Abdom/pelvis 11/13/2023:    IMPRESSION:  1: Colonic resection with ileorectal anastomosis.  2: Cholelithiasis. No evidence of acute cholecystitis.  3: Stable liver and renal cysts.  4: Prostate gland enlargement.  5: Difficult to exclude any small bowel enteritis with diffuse fluid-filled small bowel.  6: Stranding in the mesentery previously described is stable since 2021 consistent with chronic scarring.             CT Chest With Contrast    Result Date: 11/30/2021  CMS MANDATED QUALITY DATA - CT RADIATION 436 All CT scans at this facility utilize dose modulation, iterative reconstruction, and/or weight based dosing when appropriate to reduce radiation dose to as low as reasonably achievable. CLINICAL HISTORY: 66 years (1955) Male clot TECHNIQUE: CT CHEST WITH IV CONTRAST. 342 images obtained. Axial CT images of the chest were obtained after the uneventful administration of IV contrast. Soft tissue and lung windows were sent for review. Sagittal and coronal reformats were performed by the radiologist. CONTRAST: 100 mL of IV Omni 350 was administered uneventfully by IV COMPARISON: Ultrasound from 8/10/2021 FINDINGS:. Visualized neck: Within normal limits. Lungs: The lungs are essentially clear with no concerning pulmonary nodule or mass. There is a 6 mm calcified granuloma in the central left upper lobe (image 64). Airway: The trachea and central bronchial tree appear normal. Pleura: There is no pleural effusion. There is no pneumothorax. Cardiovascular: The heart is normal in size. There is no pericardial effusion. The thoracic aorta and great vessels are normal in course and  caliber. Note that the contrast bolus timing is not targeted for evaluation of pulmonary embolus or deep venous thrombosis in the upper extremities. Mediastinum: There is no supraclavicular, axillary, mediastinal, or hilar lymphadenopathy. Soft tissues: The peripheral soft tissues appear normal. Musculoskeletal: There are moderate degenerative changes of the thoracic spine.  There are no suspicious osseous lesions. Esophagus: The esophagus appears grossly normal. Upper Abdomen: No acute abnormality is seen in the upper abdomen. Relative diffuse low-attenuation liver in keeping with steatosis. Rounded low-attenuation structures are seen in the liver, incompletely assessed on this nontargeted exam, but suggestive of cysts. Partially imaged is a 5.5 cm simple fluid attenuation structure in the left kidney favored to represent a cyst. IMPRESSION: 1. No acute cardiac or pulmonary process. 2. No concerning pulmonary nodule, mass or lymphadenopathy to suggest malignancy. 3. Additional, and incidental findings as outlined above. . Electronically signed by:  Manfred Silva MD  11/30/2021 10:37 AM CST Workstation: [id number]      CT Abdom/pelvis 10/22/2021:     IMPRESSION:     1.  Patchy haziness of the central mesenteric fat with multiple shoddy lymph nodes a similar appearance compared with exam from 2017. 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  2.  Postsurgical changes of colectomy noted.  3.  Multiple hepatic and renal cysts.  4.  Mild prostatomegaly.     US Abdom  10/19/2021:     IMPRESSION:     1.  Cholelithiasis.  2.  Mild bilateral caliectasis versus hydronephrosis.  3.  Bilateral simple renal cysts.  4.  Septated hepatic cyst in the right liver lobe.        US Upper extremity:  10/8/2021     IMPRESSION:  Deep venous thrombosis in the right upper extremity at the level of the axillary vein       I have reviewed all available lab results and radiology reports.    Assessment/Plan:   (1) 68 y.o. male with  diagnosis of right axillary vein clot who has been referred by Milan Chen III, * for evaluation by medical hematology/oncology.     -He is on xarelto 20mg po daily. He developed a chord under the right arm/axilla and noticed it while taking a shower. He reports that he did not have any pain at first. He saw his PCP and had US which showed a clot.  -  No prior personal or family history of clots.   - His dad  of stroke but he was of advanced age.   - no excessive bleeding or bruising     2021:  - MTHFR-C heterozygous positive with normal homocysteine  - LA positive but he is on xarelto  - discussed the genetic implications of the MTHFR  - arm swelling resolved  - discontinue xarelto  - repeat LA in 2-3 months  - folbic and baby aspirin on full stomach    2022:  - repeat LA was negative  - now off xarelto since Dec 2021  - continue on the folbic or folbic-like supplements    2022:  - no recent swelling or bruising/bleeding  - repeat homocystiene is good  - continue on his vitamin supplements  - check B12/folate with next labs due to mild macrocytosis without any current anemia    2023:  - no excessive bleeding or bruising  - homocystine low at 6.7  - on MVI    2023:  - no excessive bleeding or bruising  - hgb adequate  - continued on MVI  - He saw Dr Chen in  2023    6/10/2024:  - no new issues except recent esophageal dilation with Dr Avalos  - homocysteine 8.1           (2) Hx/of Hep C in the  and had Ribavarin and INF  - he required numerous blood transfusion in the '80s due to his UC     (3) Asthma     (4) 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 And rthe plan is for a 3 yr  repeat     (9) Allergy/Hives issues - on injections with Dr Parker    (10) Low grade bladder ca s/p excision  - He was recently found to have low grade bladder cancer in July 2022 with planned repeat scopes 4x per year and for which he is followed by Dr Teixeira    6/1/2023:  - He recent repeat scope with Dr Teixeira; he had increase in his PSA and ended up having MRI of the prostate; he previously had a low grade bladder cancer in July 2022 . Possible prostatitis and treated with cipro po abx    12/6/2023:  - He had CT 11/13/2023 in ED when he went for stomach bug  - He recent repeat scope with Dr Teixeira and he is now on a 6 month schedule; he previously had a low grade bladder cancer in July 2022      6/10/2024:  - sees  every 6 months  - PSA at 2.0            VISIT DIAGNOSES:      Bladder tumor    Hx/of venous embolism and thrombosis of axillary veins, right    Methylenetetrahydrofolate reductase deficiency    MTHFR mutation          PLAN:  1. F/u with PCP as directed  2. Continue folbic or folbic equivalent supplements  4. Check homocysteine level every 6 months  5. Continue urologic care and observation with Dr Teixeira  6. F/u with GI  7.  F/u with PCP, Pulm, GI,  etc    RTC in 6 months  Fax note to Milan Chen III, *; Keith Aavlos Schuette; Lluvia    Discussion:     COVID-19 Discussion:     I had long discussion with patient and any applicable family about the COVID-19 coronavirus epidemic and the recommended precautions with regard to cancer and/or hematology patients. I have re-iterated the CDC recommendations for adequate hand washing, use of hand -like products, and coughing into elbow, etc. In addition, especially for our patients who are on chemotherapy and/or our otherwise immunocompromised patients, I have recommended avoidance of crowds, including movie theaters, restaurants, churches, etc. I have recommended avoidance of any sick or symptomatic family  members and/or friends. I have also recommended avoidance of any raw and unwashed food products, and general avoidance of food items that have not been prepared by themselves. The patient has been asked to call us immediately with any symptom developments, issues, questions or other general concerns.      Anticoagulation Discussion:     Discussed with patient and any applicable family members about the benefit and/or need for anticoagulation. I communicated about the risks of bleeding while on any anticoagulation, which could be serious and/or life-threatening, and which can occur at any time, regardless of degree of the level of anticoagulation. I expressed the need for compliance with any anticoagulation regimen and that failure to do so could potential lead to excessive bleeding, and risk to health and/or life. In particular, with patients on coumadin therapy, compliance with requested blood work is absolutely essential, as coumadin levels can vary from time to time, and failure to do so could potentially place the patient at risk for bleeding and/or clotting events which could be fatal. Patients on coumadin are encouraged to call the day after they have their levels drawn, as to obtain the appropriate instructions from my staff. Patients are aware that self-regulating or self-dosing of their medications is strictly prohibited.      I spent over 25 mins of time with the patient. Reviewed results of the recently ordered labs, tests and studies; made directives with regards to the results. Over half of this time was spent couseling and coordinating care.    I have explained all of the above in detail and the patient understands all of the current recommendation(s). I have answered all of their questions to the best of my ability and to their complete satisfaction.   The patient is to continue with the current management plan.            Electronically signed by Ronen Blackman MD                   Answers  submitted by the patient for this visit:  Review of Systems Questionnaire (Submitted on 6/6/2024)  appetite change : No  unexpected weight change: No  mouth sores: No  visual disturbance: No  cough: No  shortness of breath: No  chest pain: No  abdominal pain: No  diarrhea: No  frequency: No  back pain: No  rash: No  headaches: No  adenopathy: No  nervous/ anxious: No

## 2024-06-10 ENCOUNTER — OFFICE VISIT (OUTPATIENT)
Facility: CLINIC | Age: 69
End: 2024-06-10
Payer: MEDICARE

## 2024-06-10 VITALS
SYSTOLIC BLOOD PRESSURE: 144 MMHG | WEIGHT: 179.13 LBS | BODY MASS INDEX: 24.26 KG/M2 | HEART RATE: 75 BPM | TEMPERATURE: 98 F | DIASTOLIC BLOOD PRESSURE: 83 MMHG | HEIGHT: 72 IN

## 2024-06-10 DIAGNOSIS — E72.12 METHYLENETETRAHYDROFOLATE REDUCTASE DEFICIENCY: ICD-10-CM

## 2024-06-10 DIAGNOSIS — Z15.89 MTHFR MUTATION: ICD-10-CM

## 2024-06-10 DIAGNOSIS — D49.4 BLADDER TUMOR: Primary | ICD-10-CM

## 2024-06-10 DIAGNOSIS — I82.A11: ICD-10-CM

## 2024-06-10 PROCEDURE — 1126F AMNT PAIN NOTED NONE PRSNT: CPT | Mod: CPTII,S$GLB,, | Performed by: INTERNAL MEDICINE

## 2024-06-10 PROCEDURE — 3008F BODY MASS INDEX DOCD: CPT | Mod: CPTII,S$GLB,, | Performed by: INTERNAL MEDICINE

## 2024-06-10 PROCEDURE — 99213 OFFICE O/P EST LOW 20 MIN: CPT | Mod: S$GLB,,, | Performed by: INTERNAL MEDICINE

## 2024-06-10 PROCEDURE — G2211 COMPLEX E/M VISIT ADD ON: HCPCS | Mod: S$GLB,,, | Performed by: INTERNAL MEDICINE

## 2024-06-10 PROCEDURE — 3077F SYST BP >= 140 MM HG: CPT | Mod: CPTII,S$GLB,, | Performed by: INTERNAL MEDICINE

## 2024-06-10 PROCEDURE — 1160F RVW MEDS BY RX/DR IN RCRD: CPT | Mod: CPTII,S$GLB,, | Performed by: INTERNAL MEDICINE

## 2024-06-10 PROCEDURE — 1159F MED LIST DOCD IN RCRD: CPT | Mod: CPTII,S$GLB,, | Performed by: INTERNAL MEDICINE

## 2024-06-10 PROCEDURE — 3079F DIAST BP 80-89 MM HG: CPT | Mod: CPTII,S$GLB,, | Performed by: INTERNAL MEDICINE

## 2024-06-10 PROCEDURE — 1101F PT FALLS ASSESS-DOCD LE1/YR: CPT | Mod: CPTII,S$GLB,, | Performed by: INTERNAL MEDICINE

## 2024-06-10 PROCEDURE — 99999 PR PBB SHADOW E&M-EST. PATIENT-LVL III: CPT | Mod: PBBFAC,,, | Performed by: INTERNAL MEDICINE

## 2024-06-10 PROCEDURE — 3288F FALL RISK ASSESSMENT DOCD: CPT | Mod: CPTII,S$GLB,, | Performed by: INTERNAL MEDICINE

## 2024-06-10 RX ORDER — PANTOPRAZOLE SODIUM 20 MG/1
TABLET, DELAYED RELEASE ORAL
COMMUNITY
Start: 2024-05-07

## 2024-06-17 ENCOUNTER — OFFICE VISIT (OUTPATIENT)
Dept: FAMILY MEDICINE | Facility: CLINIC | Age: 69
End: 2024-06-17
Payer: MEDICARE

## 2024-06-17 VITALS
TEMPERATURE: 99 F | DIASTOLIC BLOOD PRESSURE: 78 MMHG | HEIGHT: 72 IN | BODY MASS INDEX: 24.52 KG/M2 | SYSTOLIC BLOOD PRESSURE: 128 MMHG | HEART RATE: 78 BPM | WEIGHT: 181 LBS | OXYGEN SATURATION: 98 %

## 2024-06-17 DIAGNOSIS — C67.9 MALIGNANT NEOPLASM OF URINARY BLADDER, UNSPECIFIED SITE: ICD-10-CM

## 2024-06-17 DIAGNOSIS — K21.9 GASTROESOPHAGEAL REFLUX DISEASE, UNSPECIFIED WHETHER ESOPHAGITIS PRESENT: ICD-10-CM

## 2024-06-17 DIAGNOSIS — I47.10 SVT (SUPRAVENTRICULAR TACHYCARDIA): ICD-10-CM

## 2024-06-17 DIAGNOSIS — Z00.00 PHYSICAL EXAM: Primary | ICD-10-CM

## 2024-06-17 DIAGNOSIS — Z87.19 HISTORY OF ULCERATIVE COLITIS: ICD-10-CM

## 2024-06-17 DIAGNOSIS — Z90.49 HISTORY OF COLECTOMY: ICD-10-CM

## 2024-06-17 DIAGNOSIS — G47.33 OSA ON CPAP: ICD-10-CM

## 2024-06-17 DIAGNOSIS — E72.12 METHYLENETETRAHYDROFOLATE REDUCTASE DEFICIENCY: ICD-10-CM

## 2024-06-17 DIAGNOSIS — Z15.89 MTHFR MUTATION: ICD-10-CM

## 2024-06-17 DIAGNOSIS — I82.A11: ICD-10-CM

## 2024-06-17 PROCEDURE — 1101F PT FALLS ASSESS-DOCD LE1/YR: CPT | Mod: CPTII,S$GLB,, | Performed by: FAMILY MEDICINE

## 2024-06-17 PROCEDURE — 1160F RVW MEDS BY RX/DR IN RCRD: CPT | Mod: CPTII,S$GLB,, | Performed by: FAMILY MEDICINE

## 2024-06-17 PROCEDURE — 3008F BODY MASS INDEX DOCD: CPT | Mod: CPTII,S$GLB,, | Performed by: FAMILY MEDICINE

## 2024-06-17 PROCEDURE — 1159F MED LIST DOCD IN RCRD: CPT | Mod: CPTII,S$GLB,, | Performed by: FAMILY MEDICINE

## 2024-06-17 PROCEDURE — 99999 PR PBB SHADOW E&M-EST. PATIENT-LVL IV: CPT | Mod: PBBFAC,,, | Performed by: FAMILY MEDICINE

## 2024-06-17 PROCEDURE — 1126F AMNT PAIN NOTED NONE PRSNT: CPT | Mod: CPTII,S$GLB,, | Performed by: FAMILY MEDICINE

## 2024-06-17 PROCEDURE — 3074F SYST BP LT 130 MM HG: CPT | Mod: CPTII,S$GLB,, | Performed by: FAMILY MEDICINE

## 2024-06-17 PROCEDURE — 99397 PER PM REEVAL EST PAT 65+ YR: CPT | Mod: S$GLB,,, | Performed by: FAMILY MEDICINE

## 2024-06-17 PROCEDURE — 3078F DIAST BP <80 MM HG: CPT | Mod: CPTII,S$GLB,, | Performed by: FAMILY MEDICINE

## 2024-06-17 PROCEDURE — 3288F FALL RISK ASSESSMENT DOCD: CPT | Mod: CPTII,S$GLB,, | Performed by: FAMILY MEDICINE

## 2024-06-17 NOTE — PROGRESS NOTES
SCRIBE #1 NOTE: I, Mandamina Mcneil, am scribing for, and in the presence of, Milan Chen III, MD. I have scribed the entire note.     Subjective:       Patient ID: Faisal Quiros is a 68 y.o. male.    Chief Complaint: Annual Exam    Faisal Quiros is a 68 y.o. male who presents for follow up.      Social history: Nonsmoker. No significant alcohol intake.  Exercises.     Family history: No changes.     Past medical history: Recent upper endoscopy done on 5/8/2024 with Dr Avalos. Cardiovascular good. No chest pain. No palpitations. History of venous thrombosis. No recent blood clots. Heterozygous MTHFR. SVT. Follows up with Dr Blackman.  On folic acid. BPH. PSA is current. Follows by Urology. SEAN (using CPAP regularly, compliant, benefiting from it). On Protonix. Stopped the Flomax. History of bladder cancer. Rectal stricture, doing okay. Notes that he got his hearing checked recently. He is concerned about tinnitus. BMI of 24. GERD. History of colectomy. History of ulcerative colitis.    Cholesterol is 160. HDL is 54. LDL is 86. CBC is normal. CMP is normal. Homocysteine is 8.1.          Review of Systems   Constitutional: Negative.    HENT:  Positive for hearing loss (decreased hearing loss) and tinnitus.    Eyes: Negative.    Respiratory: Negative.     Cardiovascular: Negative.  Negative for chest pain and palpitations.   Gastrointestinal: Negative.    Endocrine: Negative.    Genitourinary: Negative.    Musculoskeletal: Negative.    Skin: Negative.    Neurological: Negative.    Hematological: Negative.    Psychiatric/Behavioral: Negative.     All other systems reviewed and are negative.      Objective:      Physical examination: Vital signs noted. No acute distress.  Tympanic membranes without erythema.  No significant wax.  No carotid bruit. Regular heart rate and rhythm. Lungs clear to auscultation bilaterally. Abdomen bowel sounds are positive soft and nontender. Extremities without edema. 2+ pedal  pulses.      Assessment:       1. Physical exam    2. Methylenetetrahydrofolate reductase deficiency    3. History of ulcerative colitis    4. History of colectomy    5. MTHFR mutation    6. SEAN on CPAP    7. SVT (supraventricular tachycardia)    8. Malignant neoplasm of urinary bladder, unspecified site    9. Gastroesophageal reflux disease, unspecified whether esophagitis present    10. Hx/of venous embolism and thrombosis of axillary veins, right        Plan:       Physical exam    Methylenetetrahydrofolate reductase deficiency    History of ulcerative colitis    History of colectomy    MTHFR mutation    SEAN on CPAP    SVT (supraventricular tachycardia)    Malignant neoplasm of urinary bladder, unspecified site    Gastroesophageal reflux disease, unspecified whether esophagitis present    Hx/of venous embolism and thrombosis of axillary veins, right    Follow-up in 6-12 months.  Continue regular scopes with his gastroenterologist to check the rectal stump.    I, Dr Milan Chen, personally performed the services described in this documentation. All medical record entries made by the scribe were at my direction and in my presence. I have reviewed the chart and agree that the record reflects my personal performance and is accurate and complete.

## 2024-11-12 ENCOUNTER — OFFICE VISIT (OUTPATIENT)
Dept: PODIATRY | Facility: CLINIC | Age: 69
End: 2024-11-12
Payer: MEDICARE

## 2024-11-12 VITALS — BODY MASS INDEX: 24.85 KG/M2 | HEIGHT: 72 IN | WEIGHT: 183.44 LBS

## 2024-11-12 DIAGNOSIS — B35.1 ONYCHOMYCOSIS DUE TO DERMATOPHYTE: ICD-10-CM

## 2024-11-12 DIAGNOSIS — M79.672 LEFT FOOT PAIN: ICD-10-CM

## 2024-11-12 DIAGNOSIS — D36.10 NEUROMA: Primary | ICD-10-CM

## 2024-11-12 PROCEDURE — 3288F FALL RISK ASSESSMENT DOCD: CPT | Mod: CPTII,S$GLB,, | Performed by: PODIATRIST

## 2024-11-12 PROCEDURE — 1160F RVW MEDS BY RX/DR IN RCRD: CPT | Mod: CPTII,S$GLB,, | Performed by: PODIATRIST

## 2024-11-12 PROCEDURE — 1101F PT FALLS ASSESS-DOCD LE1/YR: CPT | Mod: CPTII,S$GLB,, | Performed by: PODIATRIST

## 2024-11-12 PROCEDURE — 99213 OFFICE O/P EST LOW 20 MIN: CPT | Mod: S$GLB,,, | Performed by: PODIATRIST

## 2024-11-12 PROCEDURE — 3008F BODY MASS INDEX DOCD: CPT | Mod: CPTII,S$GLB,, | Performed by: PODIATRIST

## 2024-11-12 PROCEDURE — 1126F AMNT PAIN NOTED NONE PRSNT: CPT | Mod: CPTII,S$GLB,, | Performed by: PODIATRIST

## 2024-11-12 PROCEDURE — 99999 PR PBB SHADOW E&M-EST. PATIENT-LVL III: CPT | Mod: PBBFAC,,, | Performed by: PODIATRIST

## 2024-11-12 PROCEDURE — 1159F MED LIST DOCD IN RCRD: CPT | Mod: CPTII,S$GLB,, | Performed by: PODIATRIST

## 2024-11-12 RX ORDER — FLUCONAZOLE 200 MG/1
200 TABLET ORAL WEEKLY
Qty: 28 TABLET | Refills: 0 | Status: SHIPPED | OUTPATIENT
Start: 2024-11-12 | End: 2025-05-21

## 2024-11-12 NOTE — PROGRESS NOTES
[address]  [place] [address]  Phone: [phone]   [phone]    Patient's PCP:Milan Chen III, MD  Referring Provider: No ref. provider found    Subjective:      Chief Complaint:: Foot Problem (Left foot ball of the foot )    RUSH Quiros is a 68 y.o. male who presents today with a complaint of Left foot ball of the foot. The current episode started about a week.  The symptoms include shocking pain random pain that comes and goes. Probable cause of complaint unknown.  The symptoms are aggravated by first few steps when getting up. The problem has stayed the same. Treatment to date have included none which provided no relief.     Vitals:    11/12/24 1350   Weight: 83.2 kg (183 lb 6.8 oz)   Height: 6' (1.829 m)   PainSc: 0-No pain      Shoe Size: 10.5    Past Surgical History:   Procedure Laterality Date    APPENDECTOMY      COLON SURGERY      CYSTOSCOPY N/A 7/7/2022    Procedure: CYSTOSCOPY;  Surgeon: Garland Teixeira MD;  Location: [facility] OR;  Service: Urology;  Laterality: N/A;    FLEXIBLE SIGMOIDOSCOPY N/A 4/8/2022    Procedure: SIGMOIDOSCOPY, FLEXIBLE;  Surgeon: Michael Avalos MD;  Location: [facility] ENDO;  Service: Endoscopy;  Laterality: N/A;    LUMBAR DISC SURGERY  2007    pet scan      right shoulder surgery  2004 and 2019    x2     SHOULDER ARTHROSCOPY Right 8/28/2019    Procedure: ARTHROSCOPY, SHOULDER;  Surgeon: Stone Suarez MD;  Location: [facility] OR;  Service: Orthopedics;  Laterality: Right;  ARTHREX NOTIFIED    total proctocolectomy  1985    WISDOM TOOTH EXTRACTION       Past Medical History:   Diagnosis Date    Allergic rhinitis     Cancer     skin    DVT (deep vein thrombosis) in pregnancy 2021    right axillary    Hx of seasonal allergies 2000    Hx/of venous embolism and thrombosis of axillary veins, right 10/11/2021    Legionella pneumonia     Lupus anticoagulant positive 12/28/2021 4/6/22 pt states negative    Right shoulder pain 2018     Seborrhea-like dermatitis with psoriasiform elements     Sleep apnea 2017    uses cpap nightly    SVT (supraventricular tachycardia) 2017    Transfusion reaction     hep c-- 1980's & treated     Family History   Problem Relation Name Age of Onset    Hypertension Mother      Allergic rhinitis Neg Hx      Allergies Neg Hx      Angioedema Neg Hx      Asthma Neg Hx      Eczema Neg Hx      Atopy Neg Hx      Immunodeficiency Neg Hx      Rhinitis Neg Hx      Urticaria Neg Hx          Social History:   Marital Status:   Alcohol History:  reports no history of alcohol use.  Tobacco History:  reports that he has never smoked. He has never used smokeless tobacco.  Drug History:  reports no history of drug use.    Review of patient's allergies indicates:  No Known Allergies    Current Outpatient Medications   Medication Sig Dispense Refill    ascorbic acid, vitamin C, (VITAMIN C) 1000 MG tablet       B-complex with vitamin C (Z-BEC OR EQUIV) tablet       calcium-vitamin D 600 mg-10 mcg (400 unit) Tab       CITICOLINE ORAL       cyanocobalamin, vitamin B-12, (VITAMIN B-12) 2,500 mcg Subl       levocetirizine (XYZAL) 5 MG tablet TAKE 1 TABLET BY MOUTH TWICE A  tablet 1    multivitamin-minerals-lutein Tab       pantoprazole (PROTONIX) 20 MG tablet       pyridoxine, vitamin B6, (B-6) 100 MG Tab       cartilage/collagen II/hyaluron (MOVE FREE ULTRA ORAL)       fluconazole (DIFLUCAN) 200 MG Tab Take 1 tablet (200 mg total) by mouth once a week. for 28 doses 28 tablet 0     Current Facility-Administered Medications   Medication Dose Route Frequency Provider Last Rate Last Admin    Allergy Mix   Subcutaneous Q30 Days Karen Parker MD   Given at 07/13/22 0821       Review of Systems   Constitutional:  Negative for chills, fatigue, fever and unexpected weight change.   HENT:  Negative for hearing loss and trouble swallowing.    Eyes:  Negative for photophobia and visual disturbance.   Respiratory:  Negative for cough,  shortness of breath and wheezing.    Cardiovascular:  Negative for chest pain, palpitations and leg swelling.   Gastrointestinal:  Negative for abdominal pain and nausea.   Genitourinary:  Negative for dysuria and frequency.   Musculoskeletal:  Negative for arthralgias, back pain, gait problem, joint swelling, myalgias and neck pain.   Skin:  Negative for rash and wound.   Neurological:  Negative for tremors, seizures, weakness, numbness and headaches.   Hematological:  Does not bruise/bleed easily.         Objective:        Physical Exam:   Foot Exam    General  General Appearance: appears stated age and healthy   Orientation: alert and oriented to person, place, and time   Affect: appropriate   Gait: unimpaired       Left Foot/Ankle      Inspection and Palpation  Ecchymosis: none  Tenderness: lesser metatarsophalangeal joints (2nd IS)  Swelling: none   Arch: normal  Hammertoes: absent  Claw toes: absent  Hallux valgus: no  Hallux limitus: no  Skin Exam: skin intact; no drainage, no ulcer and no erythema   Fungus Toenails: present    Neurovascular  Dorsalis pedis: 2+  Posterior tibial: 2+  Capillary refill: 2+  Varicose veins: not present  Saphenous nerve sensation: normal  Tibial nerve sensation: normal  Superficial peroneal nerve sensation: normal  Deep peroneal nerve sensation: normal  Sural nerve sensation: normal    Muscle Strength  Ankle dorsiflexion: 5  Ankle plantar flexion: 5  Ankle inversion: 5  Ankle eversion: 5  Great toe extension: 5  Great toe flexion: 5    Range of Motion    Normal left ankle ROM    Tests  Anterior drawer: negative   Talar tilt: negative   PT Tinel's sign: negative  Paresthesia: positive(2nd IS)      Physical Exam  Cardiovascular:      Pulses:           Dorsalis pedis pulses are 2+ on the left side.        Posterior tibial pulses are 2+ on the left side.   Musculoskeletal:      Left foot: No bunion.   Feet:      Left foot:      Skin integrity: No ulcer or erythema.      Toenail  Condition: Fungal disease present.              Left Ankle/Foot Exam     Tenderness   The patient is tender to palpation of the lesser metatarsophalangeal joints.    Range of Motion   The patient has normal left ankle ROM.       Muscle Strength   Left Lower Extremity   Ankle Dorsiflexion:  5   Plantar flexion:  5/5     Reflexes     Left Side  Paresthesia: present    Vascular Exam       Left Pulses  Dorsalis Pedis:      2+  Posterior Tibial:      2+           Imaging: none            Assessment:       1. Neuroma    2. Left foot pain    3. Onychomycosis due to dermatophyte      Plan:   Neuroma    Left foot pain    Onychomycosis due to dermatophyte  -     fluconazole (DIFLUCAN) 200 MG Tab; Take 1 tablet (200 mg total) by mouth once a week. for 28 doses  Dispense: 28 tablet; Refill: 0      Follow up if symptoms worsen or fail to improve.    Procedures        I counseled patient on causes and treatments for neuromas. Wearing tight or high-heeled shoes can cause a neuroma. Shoes that are too narrow or too pointed squeeze the bones in the ball of the foot. Shoes with high heels put extra pressure on the ends of the bones. When the bones are squeezed together, they pinch the nerve that runs between them. Taking off your shoes and rubbing the ball of your foot may decrease or relieve the pain. Recommend shoes with more room in the toe box. Sometimes steroid injection is necessary to alleviate symptoms. Discussed alcohol sclerosing injections as another option for conservative treatment.     Fungal infection of toenails explained. Treatment options including no treatment, periodic debridement, topical medications, oral medications, and removal of the nail were discussed, as well as success rates and risks of recurrence.     Counseling:     I provided patient education verbally regarding:   Patient diagnosis, treatment options, as well as alternatives, risks, and benefits.     This note was created using Dragon voice  recognition software that occasionally misinterpreted phrases or words.

## 2024-11-12 NOTE — PATIENT INSTRUCTIONS
Elizabeth's Neuroma (Intermetatarsal Neuroma)    What Is a Neuroma?  A neuroma is a thickening of nerve tissue that may develop in various parts of the body. The most common neuroma in the foot is a Elizabeth's neuroma, which occurs between the third and fourth toes. It is sometimes referred to as an intermetatarsal neuroma. Intermetatarsal describes its location in the ball of the foot between the metatarsal bones. Neuromas may also occur in other locations in the foot.    The thickening of the nerve that defines a neuroma is the result of compression and irritation of the nerve. This compression creates enlargement of the nerve, causing the symptoms of Elizabeth's neuroma and eventually leading to permanent nerve damage.      Causes of Elizabeth's Neuroma  Anything that causes compression or irritation of the nerve can lead to the development of a neuroma. One of the most common offenders is wearing shoes that have a tapered toe box or high-heeled shoes that cause the toes to be forced into the toe box. People with certain foot deformities--bunions, hammertoes, flatfeet or more flexible feet--are at higher risk for developing a neuroma. Other potential causes are activities that involve repetitive irritation to the ball of the foot, such as running or court sports. An injury or other type of trauma to the area may also lead to a neuroma.      Symptoms of Elizabeth's Neuroma  If you have a Elizabeth's neuroma, you may have one or more of these symptoms where the nerve damage is occurring:  Tingling, burning or numbness  Pain  A feeling that something is inside the ball of the foot  A feeling that there is something in the shoe or a sock is bunched up    The progression of a Elizabeth's neuroma often follows this pattern:  The symptoms begin gradually. At first, they occur only occasionally when wearing narrow-toed shoes or performing certain aggravating activities.  The symptoms may go away temporarily by removing the shoe, massaging  the foot or avoiding aggravating shoes or activities.  Over time, the symptoms progressively worsen and may persist for several days or weeks.  The symptoms become more intense as the neuroma enlarges and the temporary changes in the nerve become permanent.      Diagnosis of Elizabeth's Neuroma  To arrive at a diagnosis, the foot and ankle surgeon will obtain a thorough history of your symptoms and examine your foot. During the physical examination, the doctor attempts to reproduce your symptoms by manipulating your foot. Other tests or imaging studies may be performed.    The best time to see your foot and ankle surgeon is early in the development of symptoms. Early diagnosis of a Elizabeth's neuroma greatly lessens the need for more invasive treatments and may help you avoid surgery.      Nonsurgical Treatment  In developing a treatment plan, your foot and ankle surgeon will first determine how long you have had the neuroma and will evaluate its stage of development. Treatment approaches vary according to the severity of the problem.  For mild to moderate neuromas, treatment options may include:  Padding. Padding techniques provide support for the metatarsal arch, thereby lessening the pressure on the nerve and decreasing the compression when walking.  Icing. Placing an ice pack on the affected area helps reduce swelling.  Orthotic devices. Custom orthotic devices provided by your foot and ankle surgeon provide the support needed to reduce pressure and compression on the nerve.  Activity modifications. Activities that put repetitive pressure on the neuroma should be avoided until the condition improves.  Shoe modifications. Wear shoes with a wide toe box and avoid narrow-toed shoes or shoes with high heels.  Medications. Oral nonsteroidal anti-inflammatory drugs (NSAIDs), such as ibuprofen, may be recommended to reduce pain and inflammation.  Injection therapy. Treatment may include injections of cortisone, local  anesthetics or other agents.      When Is Surgery Needed?  Surgery may be considered in patients who have not responded adequately to nonsurgical treatments. Your foot and ankle surgeon will determine the approach that is best for your condition. The length of the recovery period will vary depending on the procedure performed.  Regardless of whether you have undergone surgical or nonsurgical treatment, your surgeon will recommend long-term measures to help keep your symptoms from returning. These include appropriate footwear and modification of activities to reduce the repetitive pressure on the foot.      Why choose a foot and ankle surgeon?  Foot and ankle surgeons are the leading experts in foot and ankle care today. As doctors of podiatric medicine - also known as podiatrists, DPMs or occasionally foot and ankle doctors - they are the board-certified surgical specialists of the podiatric profession. Foot and ankle surgeons have more education and training specific to the foot and ankle than any other healthcare provider.  Foot and ankle surgeons treat all conditions affecting the foot and ankle, from the simple to the complex, in patients of all ages including Elizabeth's neuroma. Their intensive education and training qualify foot and ankle surgeons to perform a wide range of surgeries, including any surgery that may be indicated for Elizabeth's neuroma.

## 2024-11-21 ENCOUNTER — PATIENT MESSAGE (OUTPATIENT)
Facility: CLINIC | Age: 69
End: 2024-11-21
Payer: MEDICARE

## 2024-12-09 NOTE — PROGRESS NOTES
"[facility] Hematology/Oncology  PROGRESS NOTE -  Follow-up Visit      Subjective:       Patient ID:   NAME: Faisal Quiros : 1955     69 y.o. male    Referring Doc: Milan Chen III, *  Other Physicians: Keith Duenas Albright; Goyo Lui           Chief Complaint: Rght axillary vein clot f/u     History of Present Illness:     Patient returns today for a regularly scheduled follow-up visit.  The patient is here today to go over the results of the recently ordered labs, tests and studies. He is here by himself.     He is feeling "good". Breathing ok. No excessive bleeding or bruising. He is having some sexual performance issues.     He had his esophagus dilated in early May 2024 with Dr Avalos; he has been on protonix      He has been followed by Dr Teixeira  for his previous diagnosis low grade bladder cancer in 2022;     He saw Dr Chen on 2024 and sees Dr CJ Aguayo with podiatry    Discussed covid 19 precautions - s/p vaccinations          ROS:   GEN: normal without any fever, night sweats or weight loss  HEENT: normal with no HA's, sore throat, stiff neck, changes in vision  CV: normal with no CP, SOB, PND, MAK or orthopnea  PULM: normal with no SOB, cough, hemoptysis, sputum or pleuritic pain  GI: normal with no abdominal pain, nausea, vomiting, constipation, diarrhea, melanotic stools, BRBPR, or hematemesis  : normal with no hematuria, dysuria  BREAST: normal with no mass, discharge, pain  SKIN: normal with no rash, erythema, bruising, or swelling    Pain Scale:  0    Allergies:  Review of patient's allergies indicates:  No Known Allergies    Medications:    Current Outpatient Medications:     ascorbic acid, vitamin C, (VITAMIN C) 1000 MG tablet, , Disp: , Rfl:     B-complex with vitamin C (Z-BEC OR EQUIV) tablet, , Disp: , Rfl:     calcium-vitamin D 600 mg-10 mcg (400 unit) Tab, , Disp: , Rfl:     CITICOLINE ORAL, , Disp: , Rfl:     cyanocobalamin, vitamin B-12, (VITAMIN " B-12) 2,500 mcg Subl, , Disp: , Rfl:     fluconazole (DIFLUCAN) 200 MG Tab, Take 1 tablet (200 mg total) by mouth once a week. for 28 doses, Disp: 28 tablet, Rfl: 0    levocetirizine (XYZAL) 5 MG tablet, TAKE 1 TABLET BY MOUTH TWICE A DAY, Disp: 180 tablet, Rfl: 1    multivitamin-minerals-lutein Tab, , Disp: , Rfl:     pantoprazole (PROTONIX) 20 MG tablet, , Disp: , Rfl:     pyridoxine, vitamin B6, (B-6) 100 MG Tab, , Disp: , Rfl:     Current Facility-Administered Medications:     Allergy Mix, , Subcutaneous, Q30 Days, Karen Parker MD, Given at 07/13/22 0821    PMHx/PSHx Updates:  See patient's last visit with me on 6/10/2024  See H&P on 11/24/2021        Pathology:   Cancer Staging   No matching staging information was found for the patient.      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   - NO STROMAL INVASION IS PRESENT.   - MUSCULARIS PROPRIA (DETRUSOR MUSCLE) IS NOT REPRESENTED.         Objective:     Vitals:  Blood pressure (!) 147/93, pulse 73, temperature 97.6 °F (36.4 °C), temperature source Temporal, resp. rate 16, height 6' (1.829 m), weight 81.2 kg (179 lb).    Physical Examination:   GEN: no apparent distress, comfortable; AAOx3  HEAD: atraumatic and normocephalic  EYES: no pallor, no icterus, PERRLA  ENT: OMM, no pharyngeal erythema, external ears WNL; no nasal discharge; no thrush  NECK: no masses, thyroid normal, trachea midline, no LAD/LN's, supple  CV: RRR with no murmur; normal pulse; normal S1 and S2; no pedal edema  CHEST: Normal respiratory effort; CTAB; normal breath sounds; no wheeze or crackles  ABDOM: nontender and nondistended; soft; normal bowel sounds; no rebound/guarding  MUSC/Skeletal: ROM normal; no crepitus; joints normal; no deformities or arthropathy  EXTREM: no clubbing, cyanosis, inflammation or swelling  SKIN: no rashes, lesions, ulcers, petechiae or subcutaneous nodules  : no  casarez  NEURO: grossly intact; motor/sensory WNL; AAOx3; no tremors  PSYCH: normal mood, affect and behavior  LYMPH: normal cervical, supraclavicular, axillary and groin LN's            Labs:     Lab Results   Component Value Date    WBC 6.8 12/05/2024    HGB 14.7 12/05/2024    HCT 44.5 12/05/2024    MCV 97 12/05/2024     12/05/2024       CMP  Sodium   Date Value Ref Range Status   12/05/2024 141 134 - 144 mmol/L Final   10/22/2018 142 134 - 144 mmol/L      Potassium   Date Value Ref Range Status   12/05/2024 4.0 3.5 - 5.2 mmol/L Final     Chloride   Date Value Ref Range Status   12/05/2024 102 96 - 106 mmol/L Final   10/22/2018 107 98 - 110 mmol/L      CO2   Date Value Ref Range Status   12/05/2024 24 20 - 29 mmol/L Final     Glucose   Date Value Ref Range Status   12/05/2024 102 (H) 70 - 99 mg/dL Final   10/22/2018 100 (H) 70 - 99 mg/dL      BUN   Date Value Ref Range Status   12/05/2024 18 8 - 27 mg/dL Final     Creatinine   Date Value Ref Range Status   12/05/2024 0.93 0.76 - 1.27 mg/dL Final   10/22/2018 0.75 0.60 - 1.40 mg/dL      Calcium   Date Value Ref Range Status   12/05/2024 9.0 8.6 - 10.2 mg/dL Final     Total Protein   Date Value Ref Range Status   11/13/2023 7.3 6.0 - 8.4 g/dL Final     Albumin   Date Value Ref Range Status   12/05/2024 4.0 3.9 - 4.9 g/dL Final   11/13/2023 4.2 3.5 - 5.2 g/dL Final   10/22/2018 4.0 3.1 - 4.7 g/dL      Total Bilirubin   Date Value Ref Range Status   12/05/2024 0.6 0.0 - 1.2 mg/dL Final     Alkaline Phosphatase   Date Value Ref Range Status   11/13/2023 71 55 - 135 U/L Final     AST   Date Value Ref Range Status   12/05/2024 32 0 - 40 IU/L Final     ALT   Date Value Ref Range Status   12/05/2024 29 0 - 44 IU/L Final     Anion Gap   Date Value Ref Range Status   11/13/2023 9 8 - 16 mmol/L Final     eGFR if    Date Value Ref Range Status   06/22/2022 >60.0 >60 mL/min/1.73 m^2 Final     eGFR if non    Date Value Ref Range Status    06/22/2022 >60.0 >60 mL/min/1.73 m^2 Final     Comment:     Calculation used to obtain the estimated glomerular filtration  rate (eGFR) is the CKD-EPI equation.           Lab Results   Component Value Date    EIRUQQYM95 1,045 12/05/2024     Lab Results   Component Value Date    FOLATE 16.4 12/05/2024         Homocyst(e)ine 0.0 - 17.2 umol/L 7.3               Radiology/Diagnostic Studies:    CT Abdom/pelvis 11/13/2023:    IMPRESSION:  1: Colonic resection with ileorectal anastomosis.  2: Cholelithiasis. No evidence of acute cholecystitis.  3: Stable liver and renal cysts.  4: Prostate gland enlargement.  5: Difficult to exclude any small bowel enteritis with diffuse fluid-filled small bowel.  6: Stranding in the mesentery previously described is stable since 2021 consistent with chronic scarring.             CT Chest With Contrast    Result Date: 11/30/2021  CMS MANDATED QUALITY DATA - CT RADIATION 436 All CT scans at this facility utilize dose modulation, iterative reconstruction, and/or weight based dosing when appropriate to reduce radiation dose to as low as reasonably achievable. CLINICAL HISTORY: 66 years (1955) Male clot TECHNIQUE: CT CHEST WITH IV CONTRAST. 342 images obtained. Axial CT images of the chest were obtained after the uneventful administration of IV contrast. Soft tissue and lung windows were sent for review. Sagittal and coronal reformats were performed by the radiologist. CONTRAST: 100 mL of IV Omni 350 was administered uneventfully by IV COMPARISON: Ultrasound from 8/10/2021 FINDINGS:. Visualized neck: Within normal limits. Lungs: The lungs are essentially clear with no concerning pulmonary nodule or mass. There is a 6 mm calcified granuloma in the central left upper lobe (image 64). Airway: The trachea and central bronchial tree appear normal. Pleura: There is no pleural effusion. There is no pneumothorax. Cardiovascular: The heart is normal in size. There is no pericardial effusion.  The thoracic aorta and great vessels are normal in course and caliber. Note that the contrast bolus timing is not targeted for evaluation of pulmonary embolus or deep venous thrombosis in the upper extremities. Mediastinum: There is no supraclavicular, axillary, mediastinal, or hilar lymphadenopathy. Soft tissues: The peripheral soft tissues appear normal. Musculoskeletal: There are moderate degenerative changes of the thoracic spine.  There are no suspicious osseous lesions. Esophagus: The esophagus appears grossly normal. Upper Abdomen: No acute abnormality is seen in the upper abdomen. Relative diffuse low-attenuation liver in keeping with steatosis. Rounded low-attenuation structures are seen in the liver, incompletely assessed on this nontargeted exam, but suggestive of cysts. Partially imaged is a 5.5 cm simple fluid attenuation structure in the left kidney favored to represent a cyst. IMPRESSION: 1. No acute cardiac or pulmonary process. 2. No concerning pulmonary nodule, mass or lymphadenopathy to suggest malignancy. 3. Additional, and incidental findings as outlined above. . Electronically signed by:  Manfred Silva MD  11/30/2021 10:37 AM CST Workstation: [id number]      CT Abdom/pelvis 10/22/2021:     IMPRESSION:     1.  Patchy haziness of the central mesenteric fat with multiple shoddy lymph nodes a similar appearance compared with exam from 2017. 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  2.  Postsurgical changes of colectomy noted.  3.  Multiple hepatic and renal cysts.  4.  Mild prostatomegaly.     US Abdom  10/19/2021:     IMPRESSION:     1.  Cholelithiasis.  2.  Mild bilateral caliectasis versus hydronephrosis.  3.  Bilateral simple renal cysts.  4.  Septated hepatic cyst in the right liver lobe.        US Upper extremity:  10/8/2021     IMPRESSION:  Deep venous thrombosis in the right upper extremity at the level of the axillary vein       I have reviewed all available lab results and  radiology reports.    Assessment/Plan:   (1) 69 y.o. male with diagnosis of right axillary vein clot who has been referred by Milan Chen III, * for evaluation by medical hematology/oncology.     -He is on xarelto 20mg po daily. He developed a chord under the right arm/axilla and noticed it while taking a shower. He reports that he did not have any pain at first. He saw his PCP and had US which showed a clot.  -  No prior personal or family history of clots.   - His dad  of stroke but he was of advanced age.   - no excessive bleeding or bruising     2021:  - MTHFR-C heterozygous positive with normal homocysteine  - LA positive but he is on xarelto  - discussed the genetic implications of the MTHFR  - arm swelling resolved  - discontinue xarelto  - repeat LA in 2-3 months  - folbic and baby aspirin on full stomach    2022:  - repeat LA was negative  - now off xarelto since Dec 2021  - continue on the folbic or folbic-like supplements    2022:  - no recent swelling or bruising/bleeding  - repeat homocystiene is good  - continue on his vitamin supplements  - check B12/folate with next labs due to mild macrocytosis without any current anemia    2023:  - no excessive bleeding or bruising  - homocystine low at 6.7  - on MVI    2023:  - no excessive bleeding or bruising  - hgb adequate  - continued on MVI  - He saw Dr Chen in  2023    6/10/2024:  - no new issues except recent esophageal dilation with Dr Avalos  - homocysteine 8.1    12/10/2024:  - latest CBC/plat is adequate  - no anemia  - latest homocysteine WNL at 7.3           (2) Hx/of Hep C in the  and had Ribavarin and INF  - he required numerous blood transfusion in the '80s due to his UC     (3) Asthma     (4) Hx/of legionella pneumonia and TB positive in past - seen by Dr Lui in past     (5) Eczema and seborrhea-like dermatitis     (6) SEAN     (7) SVT     (8) UC hx with total colectomy in  - followed by   Yulissa and Dr Avalos with GI    12/6/2023:  - He previously had seen Dr Avalos on 4/8/2022 and had scope which was clear. And rthe plan is for a 3 yr repeat     (9) Allergy/Hives issues - on injections with Dr Parker    (10) Low grade bladder ca s/p excision  - He was recently found to have low grade bladder cancer in July 2022 with planned repeat scopes 4x per year and for which he is followed by Dr Teixeira    6/1/2023:  - He recent repeat scope with Dr Teixeira; he had increase in his PSA and ended up having MRI of the prostate; he previously had a low grade bladder cancer in July 2022 . Possible prostatitis and treated with cipro po abx    12/6/2023:  - He had CT 11/13/2023 in ED when he went for stomach bug  - He recent repeat scope with Dr Teixeira and he is now on a 6 month schedule; he previously had a low grade bladder cancer in July 2022      6/10/2024:  - sees  every 6 months  - PSA at 2.0    12/10/2024:  - he had cystoscope in Oct 2024 with Dr Teixeira  - patient having some ED issues and wants to see another urologist for 2nd opinion            VISIT DIAGNOSES:      Methylenetetrahydrofolate reductase deficiency    Hx/of venous embolism and thrombosis of axillary veins, right    MTHFR mutation          PLAN:  1. F/u with PCP as directed  2. Continue folbic or folbic equivalent supplements  4. Check homocysteine level every 6 months  5. Continue urologic care and observation with  - refer to Dr Hennessy for 2nd opinion for his ED issues etc  6. F/u with GI  7.  F/u with PCP, Pulm, GI,  etc    RTC in 6 months  Fax note to Milan Chen III, *; Keith Avalos Schuette; Lluvia    Discussion:     COVID-19 Discussion:     I had long discussion with patient and any applicable family about the COVID-19 coronavirus epidemic and the recommended precautions with regard to cancer and/or hematology patients. I have re-iterated the CDC recommendations for adequate hand washing, use  of hand -like products, and coughing into elbow, etc. In addition, especially for our patients who are on chemotherapy and/or our otherwise immunocompromised patients, I have recommended avoidance of crowds, including movie theaters, restaurants, churches, etc. I have recommended avoidance of any sick or symptomatic family members and/or friends. I have also recommended avoidance of any raw and unwashed food products, and general avoidance of food items that have not been prepared by themselves. The patient has been asked to call us immediately with any symptom developments, issues, questions or other general concerns.      Anticoagulation Discussion:     Discussed with patient and any applicable family members about the benefit and/or need for anticoagulation. I communicated about the risks of bleeding while on any anticoagulation, which could be serious and/or life-threatening, and which can occur at any time, regardless of degree of the level of anticoagulation. I expressed the need for compliance with any anticoagulation regimen and that failure to do so could potential lead to excessive bleeding, and risk to health and/or life. In particular, with patients on coumadin therapy, compliance with requested blood work is absolutely essential, as coumadin levels can vary from time to time, and failure to do so could potentially place the patient at risk for bleeding and/or clotting events which could be fatal. Patients on coumadin are encouraged to call the day after they have their levels drawn, as to obtain the appropriate instructions from my staff. Patients are aware that self-regulating or self-dosing of their medications is strictly prohibited.      I spent over 25 mins of time with the patient. Reviewed results of the recently ordered labs, tests and studies; made directives with regards to the results. Over half of this time was spent couseling and coordinating care.    I have explained all of the  above in detail and the patient understands all of the current recommendation(s). I have answered all of their questions to the best of my ability and to their complete satisfaction.   The patient is to continue with the current management plan.            Electronically signed by Ronen Blackman MD                      Answers submitted by the patient for this visit:  Review of Systems Questionnaire (Submitted on 12/7/2024)  appetite change : No  unexpected weight change: No  mouth sores: No  visual disturbance: No  cough: No  shortness of breath: No  chest pain: No  abdominal pain: No  diarrhea: No  frequency: No  back pain: No  rash: No  headaches: No  adenopathy: No  nervous/ anxious: No

## 2024-12-10 ENCOUNTER — OFFICE VISIT (OUTPATIENT)
Facility: CLINIC | Age: 69
End: 2024-12-10
Payer: MEDICARE

## 2024-12-10 VITALS
BODY MASS INDEX: 24.24 KG/M2 | DIASTOLIC BLOOD PRESSURE: 93 MMHG | HEIGHT: 72 IN | SYSTOLIC BLOOD PRESSURE: 147 MMHG | HEART RATE: 73 BPM | RESPIRATION RATE: 16 BRPM | TEMPERATURE: 98 F | WEIGHT: 179 LBS

## 2024-12-10 DIAGNOSIS — N40.0 BENIGN PROSTATIC HYPERPLASIA, UNSPECIFIED WHETHER LOWER URINARY TRACT SYMPTOMS PRESENT: ICD-10-CM

## 2024-12-10 DIAGNOSIS — C67.9 MALIGNANT NEOPLASM OF URINARY BLADDER, UNSPECIFIED SITE: ICD-10-CM

## 2024-12-10 DIAGNOSIS — I82.A11: ICD-10-CM

## 2024-12-10 DIAGNOSIS — N52.9 ERECTILE DYSFUNCTION, UNSPECIFIED ERECTILE DYSFUNCTION TYPE: ICD-10-CM

## 2024-12-10 DIAGNOSIS — Z15.89 MTHFR MUTATION: ICD-10-CM

## 2024-12-10 DIAGNOSIS — E72.12 METHYLENETETRAHYDROFOLATE REDUCTASE DEFICIENCY: Primary | ICD-10-CM

## 2024-12-10 PROCEDURE — 3080F DIAST BP >= 90 MM HG: CPT | Mod: CPTII,S$GLB,, | Performed by: INTERNAL MEDICINE

## 2024-12-10 PROCEDURE — 1126F AMNT PAIN NOTED NONE PRSNT: CPT | Mod: CPTII,S$GLB,, | Performed by: INTERNAL MEDICINE

## 2024-12-10 PROCEDURE — 99999 PR PBB SHADOW E&M-EST. PATIENT-LVL IV: CPT | Mod: PBBFAC,,, | Performed by: INTERNAL MEDICINE

## 2024-12-10 PROCEDURE — 3077F SYST BP >= 140 MM HG: CPT | Mod: CPTII,S$GLB,, | Performed by: INTERNAL MEDICINE

## 2024-12-10 PROCEDURE — 1159F MED LIST DOCD IN RCRD: CPT | Mod: CPTII,S$GLB,, | Performed by: INTERNAL MEDICINE

## 2024-12-10 PROCEDURE — 1160F RVW MEDS BY RX/DR IN RCRD: CPT | Mod: CPTII,S$GLB,, | Performed by: INTERNAL MEDICINE

## 2024-12-10 PROCEDURE — 1101F PT FALLS ASSESS-DOCD LE1/YR: CPT | Mod: CPTII,S$GLB,, | Performed by: INTERNAL MEDICINE

## 2024-12-10 PROCEDURE — 99214 OFFICE O/P EST MOD 30 MIN: CPT | Mod: S$GLB,,, | Performed by: INTERNAL MEDICINE

## 2024-12-10 PROCEDURE — 3288F FALL RISK ASSESSMENT DOCD: CPT | Mod: CPTII,S$GLB,, | Performed by: INTERNAL MEDICINE

## 2024-12-10 PROCEDURE — G2211 COMPLEX E/M VISIT ADD ON: HCPCS | Mod: S$GLB,,, | Performed by: INTERNAL MEDICINE

## 2024-12-10 PROCEDURE — 3008F BODY MASS INDEX DOCD: CPT | Mod: CPTII,S$GLB,, | Performed by: INTERNAL MEDICINE

## 2024-12-16 ENCOUNTER — TELEPHONE (OUTPATIENT)
Dept: UROLOGY | Facility: CLINIC | Age: 69
End: 2024-12-16
Payer: MEDICARE

## 2024-12-16 NOTE — TELEPHONE ENCOUNTER
----- Message from Peggy sent at 12/16/2024  1:32 PM CST -----  Contact: pt [phone]  Type: Needs Medical Advice  Who Called:  Pt     Best Call Back Number: [phone]    Additional Information: Pt stated he need clarification on what medical records he needs to bring. Pls call back and advise

## 2024-12-18 ENCOUNTER — OFFICE VISIT (OUTPATIENT)
Dept: UROLOGY | Facility: CLINIC | Age: 69
End: 2024-12-18
Payer: MEDICARE

## 2024-12-18 VITALS — WEIGHT: 178 LBS | BODY MASS INDEX: 24.11 KG/M2 | HEIGHT: 72 IN

## 2024-12-18 DIAGNOSIS — N52.8 OTHER MALE ERECTILE DYSFUNCTION: Primary | ICD-10-CM

## 2024-12-18 DIAGNOSIS — N40.0 BENIGN PROSTATIC HYPERPLASIA, UNSPECIFIED WHETHER LOWER URINARY TRACT SYMPTOMS PRESENT: ICD-10-CM

## 2024-12-18 DIAGNOSIS — C67.9 MALIGNANT NEOPLASM OF URINARY BLADDER, UNSPECIFIED SITE: ICD-10-CM

## 2024-12-18 PROCEDURE — 1126F AMNT PAIN NOTED NONE PRSNT: CPT | Mod: CPTII,S$GLB,, | Performed by: UROLOGY

## 2024-12-18 PROCEDURE — 3288F FALL RISK ASSESSMENT DOCD: CPT | Mod: CPTII,S$GLB,, | Performed by: UROLOGY

## 2024-12-18 PROCEDURE — 3008F BODY MASS INDEX DOCD: CPT | Mod: CPTII,S$GLB,, | Performed by: UROLOGY

## 2024-12-18 PROCEDURE — 1160F RVW MEDS BY RX/DR IN RCRD: CPT | Mod: CPTII,S$GLB,, | Performed by: UROLOGY

## 2024-12-18 PROCEDURE — 99999 PR PBB SHADOW E&M-EST. PATIENT-LVL III: CPT | Mod: PBBFAC,,, | Performed by: UROLOGY

## 2024-12-18 PROCEDURE — 1101F PT FALLS ASSESS-DOCD LE1/YR: CPT | Mod: CPTII,S$GLB,, | Performed by: UROLOGY

## 2024-12-18 PROCEDURE — 1159F MED LIST DOCD IN RCRD: CPT | Mod: CPTII,S$GLB,, | Performed by: UROLOGY

## 2024-12-18 PROCEDURE — 99204 OFFICE O/P NEW MOD 45 MIN: CPT | Mod: S$GLB,,, | Performed by: UROLOGY

## 2024-12-18 RX ORDER — TADALAFIL 10 MG/1
10 TABLET ORAL DAILY PRN
Qty: 30 TABLET | Refills: 11 | Status: SHIPPED | OUTPATIENT
Start: 2024-12-18 | End: 2025-12-18

## 2024-12-18 NOTE — PROGRESS NOTES
[facility] Urology New Patient/H&P:    Faisal Quiros is a 69 y.o. male who presents for erectile dysfunction.    Patient with a history of skin cancer, ulcerative colitis s/p colon resection in 1985, SVT, pneumonia and DVT who presents with progressively worsening erectile dysfunction over the past few months.     He states that he is able to obtain an erection, but can not maintain and frequently has early detumescence. Does not wake up with erections.     Of note, he is followed by Dr. Teixeira for BPH and PUMLMP of his bladder diagnosed in 7/2022. He has had at least 6 surveillance cystoscopies that have been negative per patient. Last cystoscopy negative in 10/2024. He also reports undergoing a negative prostate biopsy in approximately 2013.     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     Denies any fever, chills, gross hematuria, flank pain, bone pain, unintentional weight loss or  trauma.       PSA  2.0  5/30/24  2.9  1/4/23  1.4  11/24/21    AMANDA  15  12/18/24    Past Medical History:   Diagnosis Date    Allergic rhinitis     Cancer     skin    DVT (deep vein thrombosis) in pregnancy 2021    right axillary    Hx of seasonal allergies 2000    Hx/of venous embolism and thrombosis of axillary veins, right 10/11/2021    Legionella pneumonia     Lupus anticoagulant positive 12/28/2021 4/6/22 pt states negative    Right shoulder pain 2018    Seborrhea-like dermatitis with psoriasiform elements     Sleep apnea 2017    uses cpap nightly    SVT (supraventricular tachycardia) 2017    Transfusion reaction     hep c-- 1980's & treated       Past Surgical History:   Procedure Laterality Date    APPENDECTOMY      COLON SURGERY      CYSTOSCOPY N/A 7/7/2022    Procedure: CYSTOSCOPY;  Surgeon: Garland Teixeira MD;  Location: [facility] OR;  Service: Urology;  Laterality: N/A;    FLEXIBLE SIGMOIDOSCOPY N/A 4/8/2022    Procedure: SIGMOIDOSCOPY, FLEXIBLE;  Surgeon: Michael Avalos MD;  Location: [facility] ENDO;  Service:  Endoscopy;  Laterality: N/A;    LUMBAR DISC SURGERY  2007    pet scan      right shoulder surgery  2004 and 2019    x2     SHOULDER ARTHROSCOPY Right 8/28/2019    Procedure: ARTHROSCOPY, SHOULDER;  Surgeon: Stone Suarez MD;  Location: [facility];  Service: Orthopedics;  Laterality: Right;  ARTHREX NOTIFIED    total proctocolectomy  1985    WISDOM TOOTH EXTRACTION         Family History   Problem Relation Name Age of Onset    Hypertension Mother      Allergic rhinitis Neg Hx      Allergies Neg Hx      Angioedema Neg Hx      Asthma Neg Hx      Eczema Neg Hx      Atopy Neg Hx      Immunodeficiency Neg Hx      Rhinitis Neg Hx      Urticaria Neg Hx         Review of patient's allergies indicates:  No Known Allergies    Medications Reviewed: see MAR      FOCUSED PHYSICAL EXAM:    There were no vitals filed for this visit.  Body mass index is 24.14 kg/m². Weight: 80.7 kg (178 lb) Height: 6' (182.9 cm)       General: Alert, cooperative, no distress, appears stated age  Abdomen: Soft, non-tender, no CVA tenderness, non-distended      LABS:    Recent Results (from the past 2 weeks)   Folate    Collection Time: 12/05/24  6:25 AM   Result Value Ref Range    Folate 16.4 >3.0 ng/mL   Vitamin B12    Collection Time: 12/05/24  6:25 AM   Result Value Ref Range    Vitamin B-12 1,045 232 - 1,245 pg/mL   Comprehensive Metabolic Panel    Collection Time: 12/05/24  6:25 AM   Result Value Ref Range    Glucose 102 (H) 70 - 99 mg/dL    BUN 18 8 - 27 mg/dL    Creatinine 0.93 0.76 - 1.27 mg/dL    eGFR 89 >59 mL/min/1.73    BUN/Creatinine Ratio 19 10 - 24    Sodium 141 134 - 144 mmol/L    Potassium 4.0 3.5 - 5.2 mmol/L    Chloride 102 96 - 106 mmol/L    CO2 24 20 - 29 mmol/L    Calcium 9.0 8.6 - 10.2 mg/dL    Protein, Total 6.7 6.0 - 8.5 g/dL    Albumin 4.0 3.9 - 4.9 g/dL    Globulin, Total 2.7 1.5 - 4.5 g/dL    Total Bilirubin 0.6 0.0 - 1.2 mg/dL    Alkaline Phosphatase 89 44 - 121 IU/L    AST 32 0 - 40 IU/L    ALT 29 0 - 44 IU/L   CBC Auto  Differential    Collection Time: 12/05/24  6:25 AM   Result Value Ref Range    WBC 6.8 3.4 - 10.8 x10E3/uL    RBC 4.58 4.14 - 5.80 x10E6/uL    Hemoglobin 14.7 13.0 - 17.7 g/dL    Hematocrit 44.5 37.5 - 51.0 %    MCV 97 79 - 97 fL    MCH 32.1 26.6 - 33.0 pg    MCHC 33.0 31.5 - 35.7 g/dL    RDW 12.3 11.6 - 15.4 %    Platelets 342 150 - 450 x10E3/uL    Neutrophils 61 Not Estab. %    Lymphs 28 Not Estab. %    Monocytes 7 Not Estab. %    Eos 2 Not Estab. %    Basos 1 Not Estab. %    Neutrophils (Absolute) 4.2 1.4 - 7.0 x10E3/uL    Lymphs (Absolute) 1.9 0.7 - 3.1 x10E3/uL    Monocytes(Absolute) 0.5 0.1 - 0.9 x10E3/uL    Eos (Absolute) 0.1 0.0 - 0.4 x10E3/uL    Baso (Absolute) 0.1 0.0 - 0.2 x10E3/uL    Immature Granulocytes 1 Not Estab. %    Immature Grans (Abs) 0.1 0.0 - 0.1 x10E3/uL   Homocysteine, Serum    Collection Time: 12/05/24  6:25 AM   Result Value Ref Range    Homocyst(e)ine 7.3 0.0 - 17.2 umol/L           Assessment/Diagnosis:    1. Other male erectile dysfunction  Ambulatory referral/consult to Urology    Testosterone Panel    tadalafiL (CIALIS) 10 MG tablet      2. Benign prostatic hyperplasia, unspecified whether lower urinary tract symptoms present  Ambulatory referral/consult to Urology      3. Malignant neoplasm of urinary bladder, unspecified site  Ambulatory referral/consult to Urology          Plans:    - I spent 45 minutes of the day of this encounter preparing for, treating and managing this patient. Extensive discussion with patient regarding the etiology and management of erectile dysfunction. Explained that erectile dysfunction is multi-factorial and can be secondary to neurologic dysfunction, prolactinoma, advanced age, cardiac disease, hypertension,  trauma, DM, renal disease, substance abuse, hypogonadism, Peyronie's disease, post-surgery, medications, depression and anxiety. We discussed that treatment is contingent on finding the cause of his ED. Treatments include PDE - 5 inhibitors (e.g.,  Cialis, Viagra), vacuum erection device, tension rings, self-injections, transurethral suppositories and penile prosthesis.   - We discussed the benefit to obtaining a serum AM testosterone for work-up of his erectile dysfunction.   - Testosterone next available.   - RX for Cialis 10 - 20 mg as needed.   - Can continue follow up with Dr. Teixeira for PUNLMP an BPH.   - RTC in 6 months for symptom check.

## 2024-12-20 LAB — TESTOST SERPL-MCNC: 339 NG/DL (ref 264–916)

## 2024-12-29 ENCOUNTER — PATIENT MESSAGE (OUTPATIENT)
Dept: UROLOGY | Facility: CLINIC | Age: 69
End: 2024-12-29
Payer: MEDICARE

## 2024-12-29 DIAGNOSIS — N52.8 OTHER MALE ERECTILE DYSFUNCTION: Primary | ICD-10-CM

## 2025-01-02 RX ORDER — TADALAFIL 5 MG/1
5 TABLET ORAL DAILY PRN
Qty: 30 TABLET | Refills: 11 | Status: SHIPPED | OUTPATIENT
Start: 2025-01-02 | End: 2026-01-02

## 2025-01-08 ENCOUNTER — PATIENT MESSAGE (OUTPATIENT)
Dept: UROLOGY | Facility: CLINIC | Age: 70
End: 2025-01-08
Payer: MEDICARE

## 2025-01-08 ENCOUNTER — TELEPHONE (OUTPATIENT)
Dept: UROLOGY | Facility: CLINIC | Age: 70
End: 2025-01-08
Payer: MEDICARE

## 2025-01-08 NOTE — TELEPHONE ENCOUNTER
----- Message from Dieter Amaya sent at 1/8/2025  4:00 PM CST -----  Regarding: FW: Needs return call    ----- Message -----  From: Neeta Petersen  Sent: 1/8/2025   3:51 PM CST  To: Gerhard GALINDO Staff  Subject: Needs return call                                Type: Needs Medical Advice  Who Called:  Pt    Best Call Back Number: [phone]    Additional Information: Pt would like to speak to the office manger please call to oscar

## 2025-01-08 NOTE — TELEPHONE ENCOUNTER
Spoke with the pt and apologized for the situation. Advised I spoke with the pt and advised that even though a testosterone panel wasn't performed it will not affect Dr Hennessy treatment preferences. Pt verbalized understanding.

## 2025-01-09 ENCOUNTER — OFFICE VISIT (OUTPATIENT)
Facility: CLINIC | Age: 70
End: 2025-01-09
Payer: MEDICARE

## 2025-01-09 VITALS
DIASTOLIC BLOOD PRESSURE: 79 MMHG | WEIGHT: 177 LBS | TEMPERATURE: 97 F | RESPIRATION RATE: 16 BRPM | HEART RATE: 66 BPM | HEIGHT: 72 IN | SYSTOLIC BLOOD PRESSURE: 133 MMHG | BODY MASS INDEX: 23.98 KG/M2

## 2025-01-09 DIAGNOSIS — Z90.49 HISTORY OF COLECTOMY: ICD-10-CM

## 2025-01-09 DIAGNOSIS — N52.9 ERECTILE DYSFUNCTION, UNSPECIFIED ERECTILE DYSFUNCTION TYPE: ICD-10-CM

## 2025-01-09 DIAGNOSIS — I82.A11: ICD-10-CM

## 2025-01-09 DIAGNOSIS — D49.4 BLADDER TUMOR: Primary | ICD-10-CM

## 2025-01-09 DIAGNOSIS — E72.12 METHYLENETETRAHYDROFOLATE REDUCTASE DEFICIENCY: ICD-10-CM

## 2025-01-09 DIAGNOSIS — Z15.89 MTHFR MUTATION: ICD-10-CM

## 2025-01-09 PROCEDURE — 99999 PR PBB SHADOW E&M-EST. PATIENT-LVL IV: CPT | Mod: PBBFAC,,, | Performed by: INTERNAL MEDICINE

## 2025-01-22 ENCOUNTER — TELEPHONE (OUTPATIENT)
Dept: FAMILY MEDICINE | Facility: CLINIC | Age: 70
End: 2025-01-22
Payer: MEDICARE

## 2025-01-22 NOTE — TELEPHONE ENCOUNTER
Changed appointment to the afternoon to give her time to find out if ice is off roads     ----- Message from Carin sent at 1/21/2025  4:01 PM CST -----  Type:  Needs Medical Advice    Who Called: Ann - [facility]    Symptoms (please be specific): kiran     How long has patient had these symptoms:  kiran    Pharmacy name and phone #:  na    Would the patient rather a call back or a response via [facility]? Call back    Best Call Back Number: Heike - [phone]  Fax# [phone]  Ref#[id number]      Additional Information: Needs notes from last office visit and to follow up on a script for the Sleep supplies      Please call Back to advise. Thanks!

## 2025-01-23 ENCOUNTER — TELEPHONE (OUTPATIENT)
Dept: HEMATOLOGY/ONCOLOGY | Facility: CLINIC | Age: 70
End: 2025-01-23
Payer: MEDICARE

## 2025-01-23 NOTE — TELEPHONE ENCOUNTER
----- Message from Ronen Blackman MD sent at 1/20/2025  8:22 PM CST -----  Make sure patient has the results

## 2025-01-24 ENCOUNTER — TELEPHONE (OUTPATIENT)
Dept: FAMILY MEDICINE | Facility: CLINIC | Age: 70
End: 2025-01-24
Payer: MEDICARE

## 2025-01-25 ENCOUNTER — OFFICE VISIT (OUTPATIENT)
Dept: FAMILY MEDICINE | Facility: CLINIC | Age: 70
End: 2025-01-25
Payer: MEDICARE

## 2025-01-25 VITALS
TEMPERATURE: 98 F | WEIGHT: 173.19 LBS | BODY MASS INDEX: 23.46 KG/M2 | SYSTOLIC BLOOD PRESSURE: 112 MMHG | DIASTOLIC BLOOD PRESSURE: 70 MMHG | HEIGHT: 72 IN

## 2025-01-25 DIAGNOSIS — E72.12 METHYLENETETRAHYDROFOLATE REDUCTASE DEFICIENCY: Primary | ICD-10-CM

## 2025-01-25 DIAGNOSIS — G47.33 OSA ON CPAP: ICD-10-CM

## 2025-01-25 DIAGNOSIS — C67.9 MALIGNANT NEOPLASM OF URINARY BLADDER, UNSPECIFIED SITE: ICD-10-CM

## 2025-01-25 DIAGNOSIS — K51.00 CHRONIC ULCERATIVE ENTEROCOLITIS WITHOUT COMPLICATION: ICD-10-CM

## 2025-01-25 DIAGNOSIS — N52.8 OTHER MALE ERECTILE DYSFUNCTION: ICD-10-CM

## 2025-01-25 DIAGNOSIS — Z87.19 HISTORY OF ULCERATIVE COLITIS: ICD-10-CM

## 2025-01-25 PROCEDURE — 3074F SYST BP LT 130 MM HG: CPT | Mod: CPTII,S$GLB,, | Performed by: FAMILY MEDICINE

## 2025-01-25 PROCEDURE — 1101F PT FALLS ASSESS-DOCD LE1/YR: CPT | Mod: CPTII,S$GLB,, | Performed by: FAMILY MEDICINE

## 2025-01-25 PROCEDURE — 1159F MED LIST DOCD IN RCRD: CPT | Mod: CPTII,S$GLB,, | Performed by: FAMILY MEDICINE

## 2025-01-25 PROCEDURE — 99999 PR PBB SHADOW E&M-EST. PATIENT-LVL IV: CPT | Mod: PBBFAC,,, | Performed by: FAMILY MEDICINE

## 2025-01-25 PROCEDURE — 3008F BODY MASS INDEX DOCD: CPT | Mod: CPTII,S$GLB,, | Performed by: FAMILY MEDICINE

## 2025-01-25 PROCEDURE — 99213 OFFICE O/P EST LOW 20 MIN: CPT | Mod: S$GLB,,, | Performed by: FAMILY MEDICINE

## 2025-01-25 PROCEDURE — 1126F AMNT PAIN NOTED NONE PRSNT: CPT | Mod: CPTII,S$GLB,, | Performed by: FAMILY MEDICINE

## 2025-01-25 PROCEDURE — 3288F FALL RISK ASSESSMENT DOCD: CPT | Mod: CPTII,S$GLB,, | Performed by: FAMILY MEDICINE

## 2025-01-25 PROCEDURE — 3078F DIAST BP <80 MM HG: CPT | Mod: CPTII,S$GLB,, | Performed by: FAMILY MEDICINE

## 2025-01-25 PROCEDURE — 1160F RVW MEDS BY RX/DR IN RCRD: CPT | Mod: CPTII,S$GLB,, | Performed by: FAMILY MEDICINE

## 2025-01-25 RX ORDER — TADALAFIL 5 MG/1
5 TABLET ORAL DAILY PRN
Qty: 30 TABLET | Refills: 11 | Status: SHIPPED | OUTPATIENT
Start: 2025-01-25 | End: 2026-01-25

## 2025-01-26 PROBLEM — C67.9 MALIGNANT NEOPLASM OF URINARY BLADDER, UNSPECIFIED SITE: Status: ACTIVE | Noted: 2022-07-01

## 2025-01-27 NOTE — PROGRESS NOTES
Subjective:       Patient ID: Faisal Quiros is a 69 y.o. male.    Chief Complaint: Follow-up (Talk about meds)    Some difficulty with the erectile dysfunction.  Saw his hematologist.  He wanted to use a different urologist saw Dr. Hennessy.  They had ordered a testosterone panel.  But it was ordered at the wrong lab.  Ended up having testosterone checked by his oncologist.  One level was 339 the rhythm was 294.  Only real medicine that he is taking that might cause some alteration testosterone level would be the Protonix.  Discontinue had severe reflux in this aphasia.  So is back on it.  History ulcerative colitis and colectomy.  Obstructive sleep apnea on CPAP.  He is concerned about the cause of the ED if it is not testosterone does he have vascular disease.    Physical examination.  Vital signs noted.  No acute distress chest clear.  Heart regular rate and rhythm.  Extremities without edema.        Objective:        Assessment:       1. Methylenetetrahydrofolate reductase deficiency    2. History of ulcerative colitis    3. BMI 23.0-23.9, adult    4. SEAN on CPAP    5. Other male erectile dysfunction    6. Chronic ulcerative enterocolitis without complication    7. Malignant neoplasm of urinary bladder, unspecified site        Plan:       Methylenetetrahydrofolate reductase deficiency  -     CT Cardiac Scoring; Future; Expected date: 01/25/2025    History of ulcerative colitis    BMI 23.0-23.9, adult    SEAN on CPAP    Other male erectile dysfunction  -     tadalafiL (CIALIS) 5 MG tablet; Take 1 tablet (5 mg total) by mouth daily as needed for Erectile Dysfunction.  Dispense: 30 tablet; Refill: 11  -     CT Cardiac Scoring; Future; Expected date: 01/25/2025    Chronic ulcerative enterocolitis without complication    Malignant neoplasm of urinary bladder, unspecified site    Cialis 5 mg has helped him.  So will refill that.  Sees gastroenterologist regarding the reflux.  Coronary calcium score ordered to determine  his risk and degree of vascular disease and small vessels.

## 2025-02-03 ENCOUNTER — TELEPHONE (OUTPATIENT)
Dept: FAMILY MEDICINE | Facility: CLINIC | Age: 70
End: 2025-02-03
Payer: MEDICARE

## 2025-02-03 ENCOUNTER — HOSPITAL ENCOUNTER (OUTPATIENT)
Dept: RADIOLOGY | Facility: HOSPITAL | Age: 70
Discharge: HOME | End: 2025-02-03
Attending: FAMILY MEDICINE
Payer: MEDICARE

## 2025-02-03 DIAGNOSIS — N52.8 OTHER MALE ERECTILE DYSFUNCTION: ICD-10-CM

## 2025-02-03 DIAGNOSIS — E72.12 METHYLENETETRAHYDROFOLATE REDUCTASE DEFICIENCY: ICD-10-CM

## 2025-02-03 PROCEDURE — 75571 CT HRT W/O DYE W/CA TEST: CPT | Mod: TC

## 2025-02-03 PROCEDURE — 75571 CT HRT W/O DYE W/CA TEST: CPT | Mod: 26,,, | Performed by: RADIOLOGY

## 2025-03-07 ENCOUNTER — TELEPHONE (OUTPATIENT)
Dept: FAMILY MEDICINE | Facility: CLINIC | Age: 70
End: 2025-03-07
Payer: MEDICARE

## 2025-03-12 ENCOUNTER — TELEPHONE (OUTPATIENT)
Dept: FAMILY MEDICINE | Facility: CLINIC | Age: 70
End: 2025-03-12
Payer: MEDICARE

## 2025-03-14 ENCOUNTER — TELEPHONE (OUTPATIENT)
Dept: FAMILY MEDICINE | Facility: CLINIC | Age: 70
End: 2025-03-14
Payer: MEDICARE

## 2025-03-14 NOTE — TELEPHONE ENCOUNTER
Faxed order over signed by dr wharton to [facility] at [phone] c pap supplies. I also called them back [phone] to let them know I just faxed order.

## 2025-03-14 NOTE — TELEPHONE ENCOUNTER
Still awaiting form to be sent re cpap supplies , will fax back soon as received. Will try back later today if not done.

## 2025-05-06 ENCOUNTER — HOSPITAL ENCOUNTER (OUTPATIENT)
Dept: PREADMISSION TESTING | Facility: HOSPITAL | Age: 70
Discharge: HOME | End: 2025-05-06
Attending: INTERNAL MEDICINE
Payer: MEDICARE

## 2025-05-06 VITALS
OXYGEN SATURATION: 99 % | DIASTOLIC BLOOD PRESSURE: 88 MMHG | HEIGHT: 72 IN | SYSTOLIC BLOOD PRESSURE: 138 MMHG | BODY MASS INDEX: 24.52 KG/M2 | WEIGHT: 181 LBS | RESPIRATION RATE: 18 BRPM | HEART RATE: 66 BPM

## 2025-05-06 DIAGNOSIS — Z01.818 PREOP TESTING: Primary | ICD-10-CM

## 2025-05-06 DIAGNOSIS — Z01.818 PRE-OP TESTING: ICD-10-CM

## 2025-05-06 LAB
ABSOLUTE EOSINOPHIL (SMH): 0.04 K/UL
ABSOLUTE MONOCYTE (SMH): 0.55 K/UL (ref 0.3–1)
ABSOLUTE NEUTROPHIL COUNT (SMH): 3.5 K/UL (ref 1.8–7.7)
ANION GAP (SMH): 8 MMOL/L (ref 8–16)
BASOPHILS # BLD AUTO: 0.03 K/UL
BASOPHILS NFR BLD AUTO: 0.5 %
BUN SERPL-MCNC: 19 MG/DL (ref 8–23)
CALCIUM SERPL-MCNC: 9.2 MG/DL (ref 8.7–10.5)
CHLORIDE SERPL-SCNC: 104 MMOL/L (ref 95–110)
CO2 SERPL-SCNC: 29 MMOL/L (ref 23–29)
CREAT SERPL-MCNC: 0.8 MG/DL (ref 0.5–1.4)
ERYTHROCYTE [DISTWIDTH] IN BLOOD BY AUTOMATED COUNT: 12.5 % (ref 11.5–14.5)
GFR SERPLBLD CREATININE-BSD FMLA CKD-EPI: >60 ML/MIN/1.73/M2
GLUCOSE SERPL-MCNC: 93 MG/DL (ref 70–110)
HCT VFR BLD AUTO: 43.6 % (ref 40–54)
HGB BLD-MCNC: 14.2 GM/DL (ref 14–18)
IMM GRANULOCYTES # BLD AUTO: 0.03 K/UL (ref 0–0.04)
IMM GRANULOCYTES NFR BLD AUTO: 0.5 % (ref 0–0.5)
LYMPHOCYTES # BLD AUTO: 1.81 K/UL (ref 1–4.8)
MCH RBC QN AUTO: 31.8 PG (ref 27–31)
MCHC RBC AUTO-ENTMCNC: 32.6 G/DL (ref 32–36)
MCV RBC AUTO: 98 FL (ref 82–98)
NUCLEATED RBC (/100WBC) (SMH): 0 /100 WBC
PLATELET # BLD AUTO: 239 K/UL (ref 150–450)
PMV BLD AUTO: 9.3 FL (ref 9.2–12.9)
POTASSIUM SERPL-SCNC: 4 MMOL/L (ref 3.5–5.1)
RBC # BLD AUTO: 4.46 M/UL (ref 4.6–6.2)
RELATIVE EOSINOPHIL (SMH): 0.7 % (ref 0–8)
RELATIVE LYMPHOCYTE (SMH): 30.2 % (ref 18–48)
RELATIVE MONOCYTE (SMH): 9.2 % (ref 4–15)
RELATIVE NEUTROPHIL (SMH): 58.9 % (ref 38–73)
SODIUM SERPL-SCNC: 141 MMOL/L (ref 136–145)
WBC # BLD AUTO: 5.99 K/UL (ref 3.9–12.7)

## 2025-05-06 PROCEDURE — 85025 COMPLETE CBC W/AUTO DIFF WBC: CPT | Performed by: ANESTHESIOLOGY

## 2025-05-06 PROCEDURE — 82435 ASSAY OF BLOOD CHLORIDE: CPT | Performed by: ANESTHESIOLOGY

## 2025-05-06 PROCEDURE — 36415 COLL VENOUS BLD VENIPUNCTURE: CPT | Performed by: ANESTHESIOLOGY

## 2025-05-06 PROCEDURE — 93005 ELECTROCARDIOGRAM TRACING: CPT | Performed by: INTERNAL MEDICINE

## 2025-05-06 PROCEDURE — 93010 ELECTROCARDIOGRAM REPORT: CPT | Mod: ,,, | Performed by: INTERNAL MEDICINE

## 2025-05-06 NOTE — DISCHARGE INSTRUCTIONS
INSTRUCTIONS  To confirm your doctor has scheduled your surgery for: 05/09    Morning of surgery please check in with registration near Parking Garage Entrance then proceed to Registration then to endoscopy department    ENDOSCOPY NURSES will call the afternoon prior to procedure with your final arrival time.    TAKE ONLY THESE MEDICATIONS WITH A SMALL SIP OF WATER THE MORNING OF SURGERY: see list    DO NOT TAKE ANY INSULIN OR ORAL DIABETIC MEDICATIONS THE MORNING OF SURGERY UNLESS DIRECTED BY YOUR PHYSICIAN OR PRE ADMIT NURSE.    DO NOT TAKE THESE MEDICATIONS 5-7 DAYS PRIOR to your procedure per your surgeon's request: ASPIRIN, ALEVE, BC powder, DYLAN SELTZER, IBUPROFEN, FISH OIL, VITAMIN E, OR HERBALS   (May take Tylenol)    If you are prescribed any types of blood thinners (Aspirin, Coumadin, Plavix, Pradaxa, Xarelto, Aggrenox, Effient, Eliquis, Savasya, Brilinta or any other), please ask your surgeon how many days before scheduled procedure should you stop taking them. You may also need to verify with prescribing physician if it is OK to stop your blood thinners.      INSTRUCTIONS IMPORTANT!!  Do not eat anything after midnight. OK to drink clear liquids until 2 hours before arrival time.  ONLY if you are diabetic, check your sugar in the morning before your procedure.  Do not smoke, vape or drink alcoholic beverages 24 hours prior to your procedure.  If you wear contact lenses, dentures, hearing aids or glasses, bring a container to put them in during surgery and give to a family member for safe keeping.    Please leave all jewelry, piercing's and valuables at home.   Wear comfortable loose clothing and rubber soled shoes.  If your condition changes such as fever, cough, etc, please notify your surgeon.   ONLY if you have been diagnosed with sleep apnea please bring your C-PAP machine.  Make arrangements in advance for transportation home by a responsible adult.  You must make arrangements for transportation,  TAXI'S, UBER'S OR LYFTS ARE NOT ALLOWED.        If you have any questions about these instructions, call Endoscopy Nurse at [phone]

## 2025-05-07 ENCOUNTER — ANESTHESIA EVENT (OUTPATIENT)
Dept: SURGERY | Facility: HOSPITAL | Age: 70
End: 2025-05-07
Payer: MEDICARE

## 2025-05-07 NOTE — ANESTHESIA PREPROCEDURE EVALUATION
05/07/2025  Faisal Quiros is a 69 y.o., male.      Results for orders placed or performed during the hospital encounter of 05/06/25   EKG 12-lead    Collection Time: 05/06/25  1:09 PM   Result Value Ref Range    QRS Duration 76 ms    [facility] QTC Calculation 387 ms    Narrative    Test Reason : Z01.818,    Vent. Rate :  69 BPM     Atrial Rate :    BPM     P-R Int :    ms          QRS Dur :  76 ms      QT Int : 362 ms       P-R-T Axes :     12  26 degrees    QTcB Int : 387 ms    Accelerated Junctional rhythm  Abnormal ECG  When compared with ECG of 21-Jun-2022 07:36,  Junctional rhythm has replaced Sinus rhythm    Referred By:            Confirmed By:              Lab Results   Component Value Date    WBC 5.99 05/06/2025    HGB 14.2 05/06/2025    HCT 43.6 05/06/2025    MCV 98 05/06/2025     05/06/2025     BMP  Lab Results   Component Value Date     05/06/2025    K 4.0 05/06/2025     05/06/2025    CO2 29 05/06/2025    BUN 19 05/06/2025    CREATININE 0.8 05/06/2025    CALCIUM 9.2 05/06/2025    ANIONGAP 8 05/06/2025    GLU 93 05/06/2025     (H) 12/05/2024    GLU 88 05/30/2024       No results found for this or any previous visit.         Pre-op Assessment    I have reviewed the Patient Summary Reports.     I have reviewed the Nursing Notes. I have reviewed the NPO Status.   I have reviewed the Medications.     Review of Systems  Anesthesia Hx:  No problems with previous Anesthesia             Denies Family Hx of Anesthesia complications.    Denies Personal Hx of Anesthesia complications.                    Social:  Non-Smoker, No Alcohol Use       Hematology/Oncology:  Hematology Normal                  Hematology Comments: History of MTHFR mutation  History of DVT  History of transfusion reaction      --  Cancer in past history:                     EENT/Dental:  EENT/Dental Normal            Cardiovascular:  Cardiovascular Normal                  ECG has been reviewed. History of supraventricular tachycardia                           Pulmonary:        Sleep Apnea, CPAP           Education provided regarding risk of obstructive sleep apnea            Renal/:    BPH    Neoplasm/Tumor, Bladder Tumor.           Hepatic/GI:  Bowel Prep. PUD,     Dysphagia  Pouchitis         Bowel Conditions:  Inflammatory Bowel Disease, Ulcerative Colitis        Musculoskeletal:  Musculoskeletal Normal                Neurological:  Neurology Normal                                      Endocrine:  Endocrine Normal            Psych:  Psychiatric Normal                    Physical Exam  General: Well nourished, Cooperative, Oriented and Alert    Airway:  Mallampati: III   Mouth Opening: Normal  TM Distance: > 6 cm  Tongue: Normal  Neck ROM: Normal ROM    Chest/Lungs:  Clear to auscultation, Normal Respiratory Rate    Heart:  Rate: Normal  Rhythm: Regular Rhythm        Anesthesia Plan  Type of Anesthesia, risks & benefits discussed:    Anesthesia Type: Gen Natural Airway  Intra-op Monitoring Plan: Standard ASA Monitors  Post Op Pain Control Plan:   (medical reason for not using multimodal pain management)  Induction:  IV  Informed Consent: Informed consent signed with the Patient and all parties understand the risks and agree with anesthesia plan.  All questions answered.   ASA Score: 2  Anesthesia Plan Notes:       GNA  POM  Propofol    Ready For Surgery From Anesthesia Perspective.     .

## 2025-05-09 ENCOUNTER — HOSPITAL ENCOUNTER (OUTPATIENT)
Facility: HOSPITAL | Age: 70
Discharge: HOME | End: 2025-05-09
Attending: INTERNAL MEDICINE | Admitting: INTERNAL MEDICINE
Payer: MEDICARE

## 2025-05-09 ENCOUNTER — RESULTS FOLLOW-UP (OUTPATIENT)
Dept: FAMILY MEDICINE | Facility: CLINIC | Age: 70
End: 2025-05-09

## 2025-05-09 ENCOUNTER — ANESTHESIA (OUTPATIENT)
Dept: SURGERY | Facility: HOSPITAL | Age: 70
End: 2025-05-09
Payer: MEDICARE

## 2025-05-09 VITALS
RESPIRATION RATE: 18 BRPM | OXYGEN SATURATION: 98 % | SYSTOLIC BLOOD PRESSURE: 166 MMHG | TEMPERATURE: 98 F | DIASTOLIC BLOOD PRESSURE: 92 MMHG | HEART RATE: 59 BPM

## 2025-05-09 DIAGNOSIS — R13.10 DYSPHAGIA: ICD-10-CM

## 2025-05-09 LAB
OHS QRS DURATION: 76 MS
OHS QTC CALCULATION: 387 MS

## 2025-05-09 PROCEDURE — 88305 TISSUE EXAM BY PATHOLOGIST: CPT | Mod: TC,59 | Performed by: PATHOLOGY

## 2025-05-09 PROCEDURE — 27201010 HC FORCEPS BIOPSY PEDI: Performed by: INTERNAL MEDICINE

## 2025-05-09 PROCEDURE — 37000008 HC ANESTHESIA 1ST 15 MINUTES: Performed by: INTERNAL MEDICINE

## 2025-05-09 PROCEDURE — 37000009 HC ANESTHESIA EA ADD 15 MINS: Performed by: INTERNAL MEDICINE

## 2025-05-09 PROCEDURE — 25000003 PHARM REV CODE 250: Performed by: NURSE ANESTHETIST, CERTIFIED REGISTERED

## 2025-05-09 PROCEDURE — 43239 EGD BIOPSY SINGLE/MULTIPLE: CPT | Performed by: INTERNAL MEDICINE

## 2025-05-09 PROCEDURE — 63600175 PHARM REV CODE 636 W HCPCS: Performed by: NURSE ANESTHETIST, CERTIFIED REGISTERED

## 2025-05-09 PROCEDURE — 43450 DILATE ESOPHAGUS 1/MULT PASS: CPT | Performed by: INTERNAL MEDICINE

## 2025-05-09 PROCEDURE — 45331 SIGMOIDOSCOPY AND BIOPSY: CPT | Mod: 33 | Performed by: INTERNAL MEDICINE

## 2025-05-09 RX ORDER — SODIUM CHLORIDE 9 MG/ML
INJECTION, SOLUTION INTRAVENOUS CONTINUOUS
Status: DISCONTINUED | OUTPATIENT
Start: 2025-05-09 | End: 2025-05-09 | Stop reason: HOSPADM

## 2025-05-09 RX ORDER — DIPHENHYDRAMINE HYDROCHLORIDE 50 MG/ML
50 INJECTION, SOLUTION INTRAMUSCULAR; INTRAVENOUS ONCE AS NEEDED
Status: DISCONTINUED | OUTPATIENT
Start: 2025-05-09 | End: 2025-05-09 | Stop reason: HOSPADM

## 2025-05-09 RX ORDER — LIDOCAINE HYDROCHLORIDE 20 MG/ML
INJECTION, SOLUTION EPIDURAL; INFILTRATION; INTRACAUDAL; PERINEURAL
Status: DISCONTINUED | OUTPATIENT
Start: 2025-05-09 | End: 2025-05-09

## 2025-05-09 RX ORDER — SODIUM CHLORIDE 0.9 % (FLUSH) 0.9 %
2 SYRINGE (ML) INJECTION
Status: DISCONTINUED | OUTPATIENT
Start: 2025-05-09 | End: 2025-05-09 | Stop reason: HOSPADM

## 2025-05-09 RX ORDER — PROPOFOL 10 MG/ML
VIAL (ML) INTRAVENOUS
Status: DISCONTINUED | OUTPATIENT
Start: 2025-05-09 | End: 2025-05-09

## 2025-05-09 RX ADMIN — PROPOFOL 40 MG: 10 INJECTION, EMULSION INTRAVENOUS at 07:05

## 2025-05-09 RX ADMIN — PROPOFOL 20 MG: 10 INJECTION, EMULSION INTRAVENOUS at 07:05

## 2025-05-09 RX ADMIN — SODIUM CHLORIDE: 0.9 INJECTION, SOLUTION INTRAVENOUS at 07:05

## 2025-05-09 RX ADMIN — LIDOCAINE HYDROCHLORIDE 40 MG: 20 INJECTION, SOLUTION INTRAVENOUS at 07:05

## 2025-05-09 NOTE — PROVATION PATIENT INSTRUCTIONS
Discharge Summary/Instructions after an Endoscopic Procedure  Patient Name: Faisal Quiros  Patient MRN: [id number]  Patient YOB: 1955  Friday, May 9, 2025  Michael Avalos MD  RESTRICTIONS:  During your procedure today, you received medications for sedation.  These   medications may affect your judgment, balance and coordination.  Therefore,   for 24 hours, you have the following restrictions:   - DO NOT drive a car, operate machinery, make legal/financial decisions,   sign important papers or drink alcohol.    ACTIVITY:  Today: no heavy lifting, straining or running due to procedural   sedation/anesthesia.  The following day: return to full activity including work.  DIET:  Eat and drink normally unless instructed otherwise.     TREATMENT FOR COMMON SIDE EFFECTS:  - Mild abdominal pain, nausea, belching, bloating or excessive gas:  rest,   eat lightly and use a heating pad.  - Sore Throat: treat with throat lozenges and/or gargle with warm salt   water.  - Because air was used during the procedure, expelling large amounts of air   from your rectum or belching is normal.  - If a bowel prep was taken, you may not have a bowel movement for 1-3 days.    This is normal.  SYMPTOMS TO WATCH FOR AND REPORT TO YOUR PHYSICIAN:  1. Abdominal pain or bloating, other than gas cramps.  2. Chest pain.  3. Back pain.  4. Signs of infection such as: chills or fever occurring within 24 hours   after the procedure.  5. Rectal bleeding, which would show as bright red, maroon, or black stools.   (A tablespoon of blood from the rectum is not serious, especially if   hemorrhoids are present.)  6. Vomiting.  7. Weakness or dizziness.  GO DIRECTLY TO THE NEAREST EMERGENCY ROOM IF YOU HAVE ANY OF THE FOLLOWING:      Difficulty breathing              Chills and/or fever over 101 F   Persistent vomiting and/or vomiting blood   Severe abdominal pain   Severe chest pain   Black, tarry stools   Bleeding- more than one tablespoon   Any  other symptom or condition that you feel may need urgent attention  Your doctor recommends these additional instructions:  If any biopsies were taken, your doctors clinic will contact you in 1 to 2   weeks with any results.  - Discharge patient to home (ambulatory).   - Await pathology results.   - Repeat upper endoscopy PRN for retreatment.  For questions, problems or results please call your physician - Michael Avalos MD at Work:  [phone].  [facility], EMERGENCY ROOM PHONE NUMBER: [phone]  IF A COMPLICATION OR EMERGENCY SITUATION ARISES AND YOU ARE UNABLE TO REACH   YOUR PHYSICIAN - GO DIRECTLY TO THE EMERGENCY ROOM.  Michael Avalos MD  5/9/2025 7:49:06 AM  This report has been verified and signed electronically.  Dear patient,  As a result of recent federal legislation (The Federal Cures Act), you may   receive lab or pathology results from your procedure in your [facility]   account before your physician is able to contact you. Your physician or   their representative will relay the results to you with their   recommendations at their soonest availability.  Thank you,  PROVATION

## 2025-05-09 NOTE — H&P
GASTROENTEROLOGY PRE-PROCEDURE H&P NOTE  Patient Name: Faisal Quiros  Patient MRN: [id number]  Patient : 1955    Service date: 2025    PCP: Milan Chen III, MD    No chief complaint on file.      HPI: Patient is a 69 y.o. male with PMHx as below here for evaluation of with dysphagia.  Known history of ulcerative colitis who has had a total colectomy.  He has an ileoanal pull-through.  Where he is brought in today for an upper endoscopy followed by flexible sigmoidoscopy..     Past Medical History:  Past Medical History:   Diagnosis Date    Allergic rhinitis     Bladder cancer     Cancer     skin    DVT (deep venous thrombosis)     History of MTHFR mutation     Hx of seasonal allergies     Hx/of venous embolism and thrombosis of axillary veins, right 10/11/2021    Legionella pneumonia     Lupus anticoagulant positive 2021 pt states negative    Right shoulder pain     Seborrhea-like dermatitis with psoriasiform elements     Sleep apnea 2017    uses cpap nightly    SVT (supraventricular tachycardia) 2017    Transfusion reaction     hep c--  & treated    Ulcerative colitis         Past Surgical History:  Past Surgical History:   Procedure Laterality Date    APPENDECTOMY      COLON SURGERY      CYSTOSCOPY N/A 2022    Procedure: CYSTOSCOPY;  Surgeon: Garland Teixeira MD;  Location: [facility] OR;  Service: Urology;  Laterality: N/A;    FLEXIBLE SIGMOIDOSCOPY N/A 2022    Procedure: SIGMOIDOSCOPY, FLEXIBLE;  Surgeon: Michael Aavlos MD;  Location: [facility];  Service: Endoscopy;  Laterality: N/A;    LUMBAR DISC SURGERY      pet scan      right shoulder surgery   and 2019    x2     SHOULDER ARTHROSCOPY Right 2019    Procedure: ARTHROSCOPY, SHOULDER;  Surgeon: Stone Suarez MD;  Location: [facility] OR;  Service: Orthopedics;  Laterality: Right;  ARTHREX NOTIFIED    total proctocolectomy  1985    WISDOM TOOTH EXTRACTION          Home  Medications:  Prescriptions Prior to Admission[1]          Current Facility-Administered Medications:     diphenhydrAMINE, 50 mg, Intravenous, Once PRN    sodium chloride 0.9%, 2 mL, Intravenous, PRN    Review of patient's allergies indicates:  No Known Allergies    Social History:   Social History     Occupational History    Not on file   Tobacco Use    Smoking status: Never    Smokeless tobacco: Never   Substance and Sexual Activity    Alcohol use: No    Drug use: No    Sexual activity: Not on file       Family History:   Family History   Problem Relation Name Age of Onset    Hypertension Mother      Allergic rhinitis Neg Hx      Allergies Neg Hx      Angioedema Neg Hx      Asthma Neg Hx      Eczema Neg Hx      Atopy Neg Hx      Immunodeficiency Neg Hx      Rhinitis Neg Hx      Urticaria Neg Hx         Review of Systems:  A 10 point review of systems was performed and was normal, except as mentioned in the HPI, including constitutional, HEENT, heme, lymph, cardiovascular, respiratory, gastrointestinal, genitourinary, neurologic, endocrine, psychiatric and musculoskeletal.      OBJECTIVE:    Physical Exam:  24 Hour Vital Sign Ranges: Temp:  [98.1 °F (36.7 °C)] 98.1 °F (36.7 °C)  Pulse:  [67] 67  Resp:  [18] 18  SpO2:  [100 %] 100 %  BP: (177)/(95) 177/95  Most recent vitals: BP (!) 177/95   Pulse 67   Temp 98.1 °F (36.7 °C) (Oral)   Resp 18   SpO2 100% Comment: RA   GEN: well-developed, well-nourished, awake and alert, non-toxic appearing adult  HEENT: PERRL, sclera anicteric, oral mucosa pink and moist without lesion  NECK: trachea midline; Good ROM  CV: regular rate and rhythm, no murmurs or gallops  RESP: clear to auscultation bilaterally, no wheezes, rhonci or rales  ABD: soft, non-tender, non-distended, normal bowel sounds  EXT: no swelling or edema, 2+ pulses distally  SKIN: no rashes or jaundice  PSYCH: normal affect    Labs:   Recent Labs     05/06/25  1331   WBC 5.99   MCV 98        Recent Labs  "    05/06/25  1331      K 4.0      CO2 29   BUN 19   GLU 93     No results for input(s): "ALB" in the last 72 hours.    Invalid input(s): "ALKP", "SGOT", "SGPT", "TBIL", "DBIL", "TPRO"  No results for input(s): "PT", "INR", "PTT" in the last 72 hours.      IMPRESSION / RECOMMENDATIONS:  Dysphagia to solids plans an upper endoscopy with dilation.  In addition patient has longstanding ulcerative colitis has had a total colectomy with a ileal anal anastomosis.  He is brought in today for flexible sigmoidoscopy as well.  Further recommendations to follow after the procedure.  with interventions as warranted.   RIsks, benefits, alternatives discussed in detail regarding upcoming procedures and sedation. Some of the more common endoscopic complications include but not limited to immediate or delayed perforation, bleeding, infections, pain, inadvertent injury to surrounding tissue / organs and possible need for surgical evaluation. Patient expressed understanding, all questions answered and will proceed with procedure as planned.     Michael Avalos  5/9/2025  7:20 AM          [1]   Facility-Administered Medications Prior to Admission   Medication Dose Route Frequency Provider Last Rate Last Admin    Allergy Mix   Subcutaneous Q30 Days Kraen Parker MD   Given at 07/13/22 0821     Medications Prior to Admission   Medication Sig Dispense Refill Last Dose/Taking    ascorbic acid, vitamin C, (VITAMIN C) 1000 MG tablet    5/8/2025    B-complex with vitamin C (Z-BEC OR EQUIV) tablet    5/8/2025    calcium-vitamin D 600 mg-10 mcg (400 unit) Tab    5/8/2025    CITICOLINE ORAL    5/8/2025    cyanocobalamin, vitamin B-12, (VITAMIN B-12) 2,500 mcg Subl    5/8/2025    fluconazole (DIFLUCAN) 200 MG Tab Take 1 tablet (200 mg total) by mouth once a week. for 28 doses 28 tablet 0 5/8/2025    levocetirizine (XYZAL) 5 MG tablet TAKE 1 TABLET BY MOUTH TWICE A  tablet 1 5/8/2025    metoprolol tartrate (LOPRESSOR) " 25 MG tablet Take 1 tablet (25 mg total) by mouth now. If heartrate does not go down in 30 minutes, take another tablet 2 tablet 0 5/8/2025    multivitamin-minerals-lutein Tab    5/8/2025    pantoprazole (PROTONIX) 20 MG tablet    5/9/2025 at  4:00 AM    pyridoxine, vitamin B6, (B-6) 100 MG Tab    5/8/2025    tadalafiL (CIALIS) 5 MG tablet Take 1 tablet (5 mg total) by mouth daily as needed for Erectile Dysfunction. 30 tablet 11 5/8/2025

## 2025-05-09 NOTE — PROGRESS NOTES
Your upper and lower endoscopy procedures showed no concerning findings. Please await pathology results from the Gi doctor.

## 2025-05-09 NOTE — ANESTHESIA POSTPROCEDURE EVALUATION
Anesthesia Post Evaluation    Patient: Faisal Quiros    Procedure(s) Performed: Procedure(s) (LRB):  EGD (ESOPHAGOGASTRODUODENOSCOPY) (N/A)  SIGMOIDOSCOPY, FLEXIBLE (N/A)    Final Anesthesia Type: general      Patient location during evaluation: GI PACU  Patient participation: Yes- Able to Participate  Level of consciousness: awake and alert, oriented and awake  Post-procedure vital signs: reviewed and stable  Pain management: adequate  Airway patency: patent    PONV status at discharge: No PONV  Anesthetic complications: no      Cardiovascular status: blood pressure returned to baseline, hemodynamically stable and stable  Respiratory status: unassisted, spontaneous ventilation and room air  Hydration status: euvolemic  Follow-up not needed.              Vitals Value Taken Time   /84 05/09/25 07:54   Temp 36.7 °C (98 °F) 05/09/25 07:55   Pulse 60 05/09/25 07:57   Resp 16 05/09/25 07:59   SpO2 99 % 05/09/25 07:57   Vitals shown include unfiled device data.      No case tracking events are documented in the log.      Pain/Lamar Score: No data recorded

## 2025-05-09 NOTE — PROVATION PATIENT INSTRUCTIONS
Discharge Summary/Instructions after an Endoscopic Procedure  Patient Name: Faisal Quiros  Patient MRN: [id number]  Patient YOB: 1955  Friday, May 9, 2025  Michael Avalos MD  RESTRICTIONS:  During your procedure today, you received medications for sedation.  These   medications may affect your judgment, balance and coordination.  Therefore,   for 24 hours, you have the following restrictions:   - DO NOT drive a car, operate machinery, make legal/financial decisions,   sign important papers or drink alcohol.    ACTIVITY:  Today: no heavy lifting, straining or running due to procedural   sedation/anesthesia.  The following day: return to full activity including work.  DIET:  Eat and drink normally unless instructed otherwise.     TREATMENT FOR COMMON SIDE EFFECTS:  - Mild abdominal pain, nausea, belching, bloating or excessive gas:  rest,   eat lightly and use a heating pad.  - Sore Throat: treat with throat lozenges and/or gargle with warm salt   water.  - Because air was used during the procedure, expelling large amounts of air   from your rectum or belching is normal.  - If a bowel prep was taken, you may not have a bowel movement for 1-3 days.    This is normal.  SYMPTOMS TO WATCH FOR AND REPORT TO YOUR PHYSICIAN:  1. Abdominal pain or bloating, other than gas cramps.  2. Chest pain.  3. Back pain.  4. Signs of infection such as: chills or fever occurring within 24 hours   after the procedure.  5. Rectal bleeding, which would show as bright red, maroon, or black stools.   (A tablespoon of blood from the rectum is not serious, especially if   hemorrhoids are present.)  6. Vomiting.  7. Weakness or dizziness.  GO DIRECTLY TO THE NEAREST EMERGENCY ROOM IF YOU HAVE ANY OF THE FOLLOWING:      Difficulty breathing              Chills and/or fever over 101 F   Persistent vomiting and/or vomiting blood   Severe abdominal pain   Severe chest pain   Black, tarry stools   Bleeding- more than one tablespoon   Any  other symptom or condition that you feel may need urgent attention  Your doctor recommends these additional instructions:  If any biopsies were taken, your doctors clinic will contact you in 1 to 2   weeks with any results.  - Discharge patient to home (ambulatory).   - Await pathology results.   - Repeat flexible sigmoidoscopy in 2 years for surveillance.  For questions, problems or results please call your physician - Michael Avalos MD at Work:  [phone].  [facility], EMERGENCY ROOM PHONE NUMBER: [phone]  IF A COMPLICATION OR EMERGENCY SITUATION ARISES AND YOU ARE UNABLE TO REACH   YOUR PHYSICIAN - GO DIRECTLY TO THE EMERGENCY ROOM.  Michael Avalos MD  5/9/2025 7:43:36 AM  This report has been verified and signed electronically.  Dear patient,  As a result of recent federal legislation (The Federal Cures Act), you may   receive lab or pathology results from your procedure in your [facility]   account before your physician is able to contact you. Your physician or   their representative will relay the results to you with their   recommendations at their soonest availability.  Thank you,  PROVATION

## 2025-05-09 NOTE — DISCHARGE INSTRUCTIONS
No driving for 24 hours  No alcohol for 24 hours  Do not make any critical decisions or sign legal documents until tomorrow.  Avoid gassy foods today (broccoli, beans, or cabbage.)  Doctor Robbie will call you with results of pathology in about 1 week.

## 2025-05-22 ENCOUNTER — PATIENT MESSAGE (OUTPATIENT)
Dept: FAMILY MEDICINE | Facility: CLINIC | Age: 70
End: 2025-05-22
Payer: MEDICARE

## 2025-05-22 DIAGNOSIS — E78.5 HYPERLIPIDEMIA, UNSPECIFIED HYPERLIPIDEMIA TYPE: ICD-10-CM

## 2025-05-22 DIAGNOSIS — Z12.5 SCREENING PSA (PROSTATE SPECIFIC ANTIGEN): Primary | ICD-10-CM

## 2025-05-24 DIAGNOSIS — B35.1 ONYCHOMYCOSIS DUE TO DERMATOPHYTE: ICD-10-CM

## 2025-05-26 RX ORDER — FLUCONAZOLE 200 MG/1
200 TABLET ORAL WEEKLY
Qty: 4 TABLET | Refills: 6 | Status: SHIPPED | OUTPATIENT
Start: 2025-05-26 | End: 2025-12-02

## 2025-05-30 ENCOUNTER — TELEPHONE (OUTPATIENT)
Facility: CLINIC | Age: 70
End: 2025-05-30
Payer: MEDICARE

## 2025-05-30 DIAGNOSIS — E72.12 METHYLENETETRAHYDROFOLATE REDUCTASE DEFICIENCY: Primary | ICD-10-CM

## 2025-06-08 ENCOUNTER — RESULTS FOLLOW-UP (OUTPATIENT)
Dept: FAMILY MEDICINE | Facility: CLINIC | Age: 70
End: 2025-06-08

## 2025-06-09 NOTE — PROGRESS NOTES
"[facility] Hematology/Oncology  PROGRESS NOTE -  Follow-up Visit      Subjective:       Patient ID:   NAME: Faisal Quiros : 1955     69 y.o. male    Referring Doc: Milan Chen III, *  Other Physicians: Keith Duenas Albright; Goyo Lui           Chief Complaint: Rght axillary vein clot f/u     History of Present Illness:     Patient returns today for a regularly scheduled follow-up visit.  The patient is here today to go over the results of the recently ordered labs, tests and studies. He is here by himself.     He is feeling "good". Breathing ok. No excessive bleeding or bruising.  He reports he has been more anxious than usual    He saw Toshia Turner NP on 2025    He had scope with Dr Avalos on  and had esophageal dilation     He previously saw Dr Heri Hennessy with  in dec 2024 and was prescribed cialis    e previously had been followed by Dr Teixeira  for his previous diagnosis low grade bladder cancer in 2022;                     ROS:   GEN: normal without any fever, night sweats or weight loss; some anxiety  HEENT: normal with no HA's, sore throat, stiff neck, changes in vision  CV: normal with no CP, SOB, PND, MAK or orthopnea  PULM: normal with no SOB, cough, hemoptysis, sputum or pleuritic pain  GI: normal with no abdominal pain, nausea, vomiting, constipation, diarrhea, melanotic stools, BRBPR, or hematemesis  : normal with no hematuria, dysuria  BREAST: normal with no mass, discharge, pain  SKIN: normal with no rash, erythema, bruising, or swelling    Pain Scale:  0    Allergies:  Review of patient's allergies indicates:  No Known Allergies    Medications:    Current Outpatient Medications:     ascorbic acid, vitamin C, (VITAMIN C) 1000 MG tablet, , Disp: , Rfl:     fluconazole (DIFLUCAN) 200 MG Tab, TAKE 1 TABLET (200 MG TOTAL) BY MOUTH ONCE A WEEK. FOR 28 DOSES, Disp: 4 tablet, Rfl: 6    pantoprazole (PROTONIX) 20 MG tablet, , Disp: , Rfl:     pyridoxine, vitamin " B6, (B-6) 100 MG Tab, , Disp: , Rfl:     B-complex with vitamin C (Z-BEC OR EQUIV) tablet, , Disp: , Rfl:     calcium-vitamin D 600 mg-10 mcg (400 unit) Tab, , Disp: , Rfl:     CITICOLINE ORAL, , Disp: , Rfl:     cyanocobalamin, vitamin B-12, (VITAMIN B-12) 2,500 mcg Subl, , Disp: , Rfl:     levocetirizine (XYZAL) 5 MG tablet, TAKE 1 TABLET BY MOUTH TWICE A DAY, Disp: 180 tablet, Rfl: 1    metoprolol tartrate (LOPRESSOR) 25 MG tablet, Take 1 tablet (25 mg total) by mouth now. If heartrate does not go down in 30 minutes, take another tablet, Disp: 2 tablet, Rfl: 0    multivitamin-minerals-lutein Tab, , Disp: , Rfl:     tadalafiL (CIALIS) 5 MG tablet, Take 1 tablet (5 mg total) by mouth daily as needed for Erectile Dysfunction., Disp: 30 tablet, Rfl: 11    Current Facility-Administered Medications:     Allergy Mix, , Subcutaneous, Q30 Days, Karen Parker MD, Given at 07/13/22 0821    PMHx/PSHx Updates:  See patient's last visit with me on 1/9/2025  See H&P on 11/24/2021        Pathology:   Cancer Staging   No matching staging information was found for the patient.      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   - NO STROMAL INVASION IS PRESENT.   - MUSCULARIS PROPRIA (DETRUSOR MUSCLE) IS NOT REPRESENTED.         Objective:     Vitals:  Blood pressure 127/84, pulse 63, temperature 98.1 °F (36.7 °C), temperature source Temporal, resp. rate 16, height 6' (1.829 m), weight 80.7 kg (178 lb), SpO2 95%.    Physical Examination:   GEN: no apparent distress, comfortable; AAOx3  HEAD: atraumatic and normocephalic  EYES: no pallor, no icterus, PERRLA  ENT: OMM, no pharyngeal erythema, external ears WNL; no nasal discharge; no thrush  NECK: no masses, thyroid normal, trachea midline, no LAD/LN's, supple  CV: RRR with no murmur; normal pulse; normal S1 and S2; no pedal edema  CHEST: Normal respiratory effort; CTAB; normal breath  sounds; no wheeze or crackles  ABDOM: nontender and nondistended; soft; normal bowel sounds; no rebound/guarding  MUSC/Skeletal: ROM normal; no crepitus; joints normal; no deformities or arthropathy  EXTREM: no clubbing, cyanosis, inflammation or swelling  SKIN: no rashes, lesions, ulcers, petechiae or subcutaneous nodules  : no casarez  NEURO: grossly intact; motor/sensory WNL; AAOx3; no tremors  PSYCH: normal mood, affect and behavior  LYMPH: normal cervical, supraclavicular, axillary and groin LN's            Labs:     Lab Results   Component Value Date    WBC 5.7 06/09/2025    HGB 14.2 06/09/2025    HCT 43.9 06/09/2025    MCV 99 (H) 06/09/2025     06/09/2025       CMP  Sodium   Date Value Ref Range Status   06/09/2025 139 134 - 144 mmol/L Final   05/06/2025 141 136 - 145 mmol/L Final   10/22/2018 142 134 - 144 mmol/L      Potassium   Date Value Ref Range Status   06/09/2025 3.9 3.5 - 5.2 mmol/L Final   05/06/2025 4.0 3.5 - 5.1 mmol/L Final     Chloride   Date Value Ref Range Status   06/09/2025 105 96 - 106 mmol/L Final   05/06/2025 104 95 - 110 mmol/L Final   10/22/2018 107 98 - 110 mmol/L      CO2   Date Value Ref Range Status   06/09/2025 23 20 - 29 mmol/L Final   05/06/2025 29 23 - 29 mmol/L Final     Glucose   Date Value Ref Range Status   06/09/2025 98 70 - 99 mg/dL Final   05/06/2025 93 70 - 110 mg/dL Final   10/22/2018 100 (H) 70 - 99 mg/dL      BUN   Date Value Ref Range Status   06/09/2025 14 8 - 27 mg/dL Final   05/06/2025 19 8 - 23 mg/dL Final     Creatinine   Date Value Ref Range Status   06/09/2025 0.97 0.76 - 1.27 mg/dL Final   05/06/2025 0.8 0.5 - 1.4 mg/dL Final   10/22/2018 0.75 0.60 - 1.40 mg/dL      Calcium   Date Value Ref Range Status   06/09/2025 8.9 8.6 - 10.2 mg/dL Final   05/06/2025 9.2 8.7 - 10.5 mg/dL Final     Total Protein   Date Value Ref Range Status   11/13/2023 7.3 6.0 - 8.4 g/dL Final     Albumin   Date Value Ref Range Status   06/09/2025 4.0 3.9 - 4.9 g/dL Final    11/13/2023 4.2 3.5 - 5.2 g/dL Final   10/22/2018 4.0 3.1 - 4.7 g/dL      Total Bilirubin   Date Value Ref Range Status   06/09/2025 0.8 0.0 - 1.2 mg/dL Final     Alkaline Phosphatase   Date Value Ref Range Status   11/13/2023 71 55 - 135 U/L Final     AST   Date Value Ref Range Status   06/09/2025 32 0 - 40 IU/L Final     ALT   Date Value Ref Range Status   06/09/2025 27 0 - 44 IU/L Final     Anion Gap   Date Value Ref Range Status   05/06/2025 8 8 - 16 mmol/L Final     eGFR if    Date Value Ref Range Status   06/22/2022 >60.0 >60 mL/min/1.73 m^2 Final     eGFR if non    Date Value Ref Range Status   06/22/2022 >60.0 >60 mL/min/1.73 m^2 Final     Comment:     Calculation used to obtain the estimated glomerular filtration  rate (eGFR) is the CKD-EPI equation.           Lab Results   Component Value Date    YCMNDKRA47 1,062 06/09/2025     Lab Results   Component Value Date    FOLATE >20.0 06/09/2025     Homocyst(e)ine 7.7     PSA - [facility] 1.8           Radiology/Diagnostic Studies:    CT Abdom/pelvis 11/13/2023:    IMPRESSION:  1: Colonic resection with ileorectal anastomosis.  2: Cholelithiasis. No evidence of acute cholecystitis.  3: Stable liver and renal cysts.  4: Prostate gland enlargement.  5: Difficult to exclude any small bowel enteritis with diffuse fluid-filled small bowel.  6: Stranding in the mesentery previously described is stable since 2021 consistent with chronic scarring.             CT Chest With Contrast    Result Date: 11/30/2021  CMS MANDATED QUALITY DATA - CT RADIATION 436 All CT scans at this facility utilize dose modulation, iterative reconstruction, and/or weight based dosing when appropriate to reduce radiation dose to as low as reasonably achievable. CLINICAL HISTORY: 66 years (1955) Male clot TECHNIQUE: CT CHEST WITH IV CONTRAST. 342 images obtained. Axial CT images of the chest were obtained after the uneventful administration of IV contrast. Soft  tissue and lung windows were sent for review. Sagittal and coronal reformats were performed by the radiologist. CONTRAST: 100 mL of IV Omni 350 was administered uneventfully by IV COMPARISON: Ultrasound from 8/10/2021 FINDINGS:. Visualized neck: Within normal limits. Lungs: The lungs are essentially clear with no concerning pulmonary nodule or mass. There is a 6 mm calcified granuloma in the central left upper lobe (image 64). Airway: The trachea and central bronchial tree appear normal. Pleura: There is no pleural effusion. There is no pneumothorax. Cardiovascular: The heart is normal in size. There is no pericardial effusion. The thoracic aorta and great vessels are normal in course and caliber. Note that the contrast bolus timing is not targeted for evaluation of pulmonary embolus or deep venous thrombosis in the upper extremities. Mediastinum: There is no supraclavicular, axillary, mediastinal, or hilar lymphadenopathy. Soft tissues: The peripheral soft tissues appear normal. Musculoskeletal: There are moderate degenerative changes of the thoracic spine.  There are no suspicious osseous lesions. Esophagus: The esophagus appears grossly normal. Upper Abdomen: No acute abnormality is seen in the upper abdomen. Relative diffuse low-attenuation liver in keeping with steatosis. Rounded low-attenuation structures are seen in the liver, incompletely assessed on this nontargeted exam, but suggestive of cysts. Partially imaged is a 5.5 cm simple fluid attenuation structure in the left kidney favored to represent a cyst. IMPRESSION: 1. No acute cardiac or pulmonary process. 2. No concerning pulmonary nodule, mass or lymphadenopathy to suggest malignancy. 3. Additional, and incidental findings as outlined above. . Electronically signed by:  Manfred Silva MD  11/30/2021 10:37 AM [facility] Workstation: [id number]      CT Abdom/pelvis 10/22/2021:     IMPRESSION:     1.  Patchy haziness of the central mesenteric fat with multiple  shoddy lymph nodes a similar appearance compared with exam from 2017. 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  2.  Postsurgical changes of colectomy noted.  3.  Multiple hepatic and renal cysts.  4.  Mild prostatomegaly.     US Abdom  10/19/2021:     IMPRESSION:     1.  Cholelithiasis.  2.  Mild bilateral caliectasis versus hydronephrosis.  3.  Bilateral simple renal cysts.  4.  Septated hepatic cyst in the right liver lobe.        US Upper extremity:  10/8/2021     IMPRESSION:  Deep venous thrombosis in the right upper extremity at the level of the axillary vein       I have reviewed all available lab results and radiology reports.    Assessment/Plan:   (1) 69 y.o. male with diagnosis of right axillary vein clot who has been referred by Milan Chen III, * for evaluation by medical hematology/oncology.     -He is on xarelto 20mg po daily. He developed a chord under the right arm/axilla and noticed it while taking a shower. He reports that he did not have any pain at first. He saw his PCP and had US which showed a clot.  -  No prior personal or family history of clots.   - His dad  of stroke but he was of advanced age.   - no excessive bleeding or bruising     2021:  - MTHFR-C heterozygous positive with normal homocysteine  - LA positive but he is on xarelto  - discussed the genetic implications of the MTHFR  - arm swelling resolved  - discontinue xarelto  - repeat LA in 2-3 months  - folbic and baby aspirin on full stomach    2022:  - repeat LA was negative  - now off xarelto since Dec 2021  - continue on the folbic or folbic-like supplements    2022:  - no recent swelling or bruising/bleeding  - repeat homocystiene is good  - continue on his vitamin supplements  - check B12/folate with next labs due to mild macrocytosis without any current anemia    2023:  - no excessive bleeding or bruising  - homocystine low at 6.7  - on MVI    2023:  - no excessive bleeding or  bruising  - hgb adequate  - continued on MVI  - He saw Dr Chen in  June 2023    6/10/2024:  - no new issues except recent esophageal dilation with Dr Avalos  - homocysteine 8.1    12/10/2024:  - latest CBC/plat is adequate  - no anemia  - latest homocysteine WNL at 7.3    6/10/2025:  - latest labs adequate  - homocysteine 7.7           (2) Hx/of Hep C in the 1998 and had Ribavarin and INF  - he required numerous blood transfusion in the '80s due to his UC     (3) Asthma     (4) Hx/of legionella pneumonia and TB positive in past - seen by Dr Lui in past     (5) Eczema and seborrhea-like dermatitis     (6) SEAN     (7) SVT     (8) UC hx with total colectomy in 1985 - followed by Dr Duenas and Dr Avalos with GI    12/6/2023:  - He previously had seen Dr Avalos on 4/8/2022 and had scope which was clear. And rthe plan is for a 3 yr repeat     (9) Allergy/Hives issues - on injections with Dr Parker    (10) Low grade bladder ca s/p excision  - He was recently found to have low grade bladder cancer in July 2022 with planned repeat scopes 4x per year and for which he is followed by Dr Teixeira    6/1/2023:  - He recent repeat scope with Dr Teixeira; he had increase in his PSA and ended up having MRI of the prostate; he previously had a low grade bladder cancer in July 2022 . Possible prostatitis and treated with cipro po abx    12/6/2023:  - He had CT 11/13/2023 in ED when he went for stomach bug  - He recent repeat scope with Dr Teixeira and he is now on a 6 month schedule; he previously had a low grade bladder cancer in July 2022      6/10/2024:  - sees  every 6 months  - PSA at 2.0    12/10/2024:  - he had cystoscope in Oct 2024 with Dr Teixeira  - patient having some ED issues and wants to see another urologist for 2nd opinion    1/9/2025:  - He saw Dr Heri Hennessy with  on 12/28 and was prescribed cialis and ordered a testosterone panel.   - testosterone level was normal  339            VISIT DIAGNOSES:      Malignant neoplasm of urinary bladder, unspecified site    Methylenetetrahydrofolate reductase deficiency    History of colectomy    MTHFR mutation          PLAN:  1. F/u with PCP as directed - he probably could benefit from an low dose antidepressant/antianxiety  2. Continue folbic or folbic equivalent supplements  4. Check homocysteine level every 6 months  5. F/U with urology for the hx/of bladder and his ED issues  6. F/u with GI  7.  F/u with PCP, Pulm, GI,  etc    RTC in 6 months  Fax note to Milan Chen III, *; Keith Avalos Schuette; Lluvia    Discussion:     COVID-19 Discussion:     I had long discussion with patient and any applicable family about the COVID-19 coronavirus epidemic and the recommended precautions with regard to cancer and/or hematology patients. I have re-iterated the CDC recommendations for adequate hand washing, use of hand -like products, and coughing into elbow, etc. In addition, especially for our patients who are on chemotherapy and/or our otherwise immunocompromised patients, I have recommended avoidance of crowds, including movie theaters, restaurants, churches, etc. I have recommended avoidance of any sick or symptomatic family members and/or friends. I have also recommended avoidance of any raw and unwashed food products, and general avoidance of food items that have not been prepared by themselves. The patient has been asked to call us immediately with any symptom developments, issues, questions or other general concerns.      Anticoagulation Discussion:     Discussed with patient and any applicable family members about the benefit and/or need for anticoagulation. I communicated about the risks of bleeding while on any anticoagulation, which could be serious and/or life-threatening, and which can occur at any time, regardless of degree of the level of anticoagulation. I expressed the need for compliance with any  anticoagulation regimen and that failure to do so could potential lead to excessive bleeding, and risk to health and/or life. In particular, with patients on coumadin therapy, compliance with requested blood work is absolutely essential, as coumadin levels can vary from time to time, and failure to do so could potentially place the patient at risk for bleeding and/or clotting events which could be fatal. Patients on coumadin are encouraged to call the day after they have their levels drawn, as to obtain the appropriate instructions from my staff. Patients are aware that self-regulating or self-dosing of their medications is strictly prohibited.      I spent over 25 mins of time with the patient. Reviewed results of the recently ordered labs, tests and studies; made directives with regards to the results. Over half of this time was spent couseling and coordinating care.    I have explained all of the above in detail and the patient understands all of the current recommendation(s). I have answered all of their questions to the best of my ability and to their complete satisfaction.   The patient is to continue with the current management plan.            Electronically signed by Ronen Blackman MD                          Answers submitted by the patient for this visit:  Review of Systems Questionnaire (Submitted on 6/3/2025)  appetite change : No  unexpected weight change: No  mouth sores: No  visual disturbance: No  cough: No  shortness of breath: No  chest pain: No  abdominal pain: No  diarrhea: No  frequency: No  back pain: No  rash: No  headaches: No  adenopathy: No  nervous/ anxious: No

## 2025-06-10 ENCOUNTER — OFFICE VISIT (OUTPATIENT)
Facility: CLINIC | Age: 70
End: 2025-06-10
Payer: MEDICARE

## 2025-06-10 ENCOUNTER — PATIENT MESSAGE (OUTPATIENT)
Facility: CLINIC | Age: 70
End: 2025-06-10

## 2025-06-10 ENCOUNTER — TELEPHONE (OUTPATIENT)
Facility: CLINIC | Age: 70
End: 2025-06-10

## 2025-06-10 VITALS
HEIGHT: 72 IN | HEART RATE: 63 BPM | DIASTOLIC BLOOD PRESSURE: 84 MMHG | TEMPERATURE: 98 F | BODY MASS INDEX: 24.11 KG/M2 | WEIGHT: 178 LBS | SYSTOLIC BLOOD PRESSURE: 127 MMHG | OXYGEN SATURATION: 95 % | RESPIRATION RATE: 16 BRPM

## 2025-06-10 DIAGNOSIS — Z90.49 HISTORY OF COLECTOMY: ICD-10-CM

## 2025-06-10 DIAGNOSIS — R25.2 MUSCLE CRAMPS: ICD-10-CM

## 2025-06-10 DIAGNOSIS — E55.9 VITAMIN D DEFICIENCY, UNSPECIFIED: ICD-10-CM

## 2025-06-10 DIAGNOSIS — C67.9 MALIGNANT NEOPLASM OF URINARY BLADDER, UNSPECIFIED SITE: Primary | ICD-10-CM

## 2025-06-10 DIAGNOSIS — D53.9 NUTRITIONAL ANEMIA, UNSPECIFIED: ICD-10-CM

## 2025-06-10 DIAGNOSIS — E72.12 METHYLENETETRAHYDROFOLATE REDUCTASE DEFICIENCY: ICD-10-CM

## 2025-06-10 DIAGNOSIS — Z15.89 MTHFR MUTATION: ICD-10-CM

## 2025-06-10 LAB
ALBUMIN SERPL-MCNC: 4 G/DL (ref 3.9–4.9)
ALP SERPL-CCNC: 83 IU/L (ref 44–121)
ALT SERPL-CCNC: 27 IU/L (ref 0–44)
AST SERPL-CCNC: 32 IU/L (ref 0–40)
BASOPHILS # BLD AUTO: 0 X10E3/UL (ref 0–0.2)
BASOPHILS NFR BLD AUTO: 1 %
BILIRUB SERPL-MCNC: 0.8 MG/DL (ref 0–1.2)
BUN SERPL-MCNC: 14 MG/DL (ref 8–27)
BUN/CREAT SERPL: 14 (ref 10–24)
CALCIUM SERPL-MCNC: 8.9 MG/DL (ref 8.6–10.2)
CHLORIDE SERPL-SCNC: 105 MMOL/L (ref 96–106)
CO2 SERPL-SCNC: 23 MMOL/L (ref 20–29)
CREAT SERPL-MCNC: 0.97 MG/DL (ref 0.76–1.27)
EOSINOPHIL # BLD AUTO: 0.1 X10E3/UL (ref 0–0.4)
EOSINOPHIL NFR BLD AUTO: 1 %
ERYTHROCYTE [DISTWIDTH] IN BLOOD BY AUTOMATED COUNT: 13.3 % (ref 11.6–15.4)
EST. GFR  (NO RACE VARIABLE): 85 ML/MIN/1.73
FOLATE SERPL-MCNC: >20 NG/ML
GLOBULIN SER CALC-MCNC: 2.4 G/DL (ref 1.5–4.5)
GLUCOSE SERPL-MCNC: 98 MG/DL (ref 70–99)
HCT VFR BLD AUTO: 43.9 % (ref 37.5–51)
HGB BLD-MCNC: 14.2 G/DL (ref 13–17.7)
IMM GRANULOCYTES # BLD AUTO: 0 X10E3/UL (ref 0–0.1)
IMM GRANULOCYTES NFR BLD AUTO: 1 %
LYMPHOCYTES # BLD AUTO: 1.6 X10E3/UL (ref 0.7–3.1)
LYMPHOCYTES NFR BLD AUTO: 28 %
MCH RBC QN AUTO: 32.1 PG (ref 26.6–33)
MCHC RBC AUTO-ENTMCNC: 32.3 G/DL (ref 31.5–35.7)
MCV RBC AUTO: 99 FL (ref 79–97)
MONOCYTES # BLD AUTO: 0.4 X10E3/UL (ref 0.1–0.9)
MONOCYTES NFR BLD AUTO: 7 %
NEUTROPHILS # BLD AUTO: 3.6 X10E3/UL (ref 1.4–7)
NEUTROPHILS NFR BLD AUTO: 62 %
PLATELET # BLD AUTO: 252 X10E3/UL (ref 150–450)
POTASSIUM SERPL-SCNC: 3.9 MMOL/L (ref 3.5–5.2)
PROT SERPL-MCNC: 6.4 G/DL (ref 6–8.5)
RBC # BLD AUTO: 4.43 X10E6/UL (ref 4.14–5.8)
SODIUM SERPL-SCNC: 139 MMOL/L (ref 134–144)
VIT B12 SERPL-MCNC: 1062 PG/ML (ref 232–1245)
WBC # BLD AUTO: 5.7 X10E3/UL (ref 3.4–10.8)

## 2025-06-10 PROCEDURE — 3074F SYST BP LT 130 MM HG: CPT | Mod: CPTII,S$GLB,, | Performed by: INTERNAL MEDICINE

## 2025-06-10 PROCEDURE — G2211 COMPLEX E/M VISIT ADD ON: HCPCS | Mod: S$GLB,,, | Performed by: INTERNAL MEDICINE

## 2025-06-10 PROCEDURE — 1101F PT FALLS ASSESS-DOCD LE1/YR: CPT | Mod: CPTII,S$GLB,, | Performed by: INTERNAL MEDICINE

## 2025-06-10 PROCEDURE — 3288F FALL RISK ASSESSMENT DOCD: CPT | Mod: CPTII,S$GLB,, | Performed by: INTERNAL MEDICINE

## 2025-06-10 PROCEDURE — 99999 PR PBB SHADOW E&M-EST. PATIENT-LVL IV: CPT | Mod: PBBFAC,,, | Performed by: INTERNAL MEDICINE

## 2025-06-10 PROCEDURE — 3008F BODY MASS INDEX DOCD: CPT | Mod: CPTII,S$GLB,, | Performed by: INTERNAL MEDICINE

## 2025-06-10 PROCEDURE — 99214 OFFICE O/P EST MOD 30 MIN: CPT | Mod: S$GLB,,, | Performed by: INTERNAL MEDICINE

## 2025-06-10 PROCEDURE — 1159F MED LIST DOCD IN RCRD: CPT | Mod: CPTII,S$GLB,, | Performed by: INTERNAL MEDICINE

## 2025-06-10 PROCEDURE — 1160F RVW MEDS BY RX/DR IN RCRD: CPT | Mod: CPTII,S$GLB,, | Performed by: INTERNAL MEDICINE

## 2025-06-10 PROCEDURE — 1126F AMNT PAIN NOTED NONE PRSNT: CPT | Mod: CPTII,S$GLB,, | Performed by: INTERNAL MEDICINE

## 2025-06-10 PROCEDURE — 3079F DIAST BP 80-89 MM HG: CPT | Mod: CPTII,S$GLB,, | Performed by: INTERNAL MEDICINE

## 2025-06-10 NOTE — TELEPHONE ENCOUNTER
----- Message from Alla sent at 6/10/2025  9:05 AM CDT -----  Pt forgot to ask Dr. SMILEY if he would add a magnesium lab to the orders for [facility]. He is asking for a copy of labs for . I will print them out after Dr. SMILEY decides to add or notCb [phone]

## 2025-06-12 LAB
25(OH)D3+25(OH)D2 SERPL-MCNC: 41.1 NG/ML (ref 30–100)
ALBUMIN SERPL-MCNC: 4 G/DL (ref 3.9–4.9)
ALP SERPL-CCNC: 81 IU/L (ref 44–121)
ALT SERPL-CCNC: 26 IU/L (ref 0–44)
AST SERPL-CCNC: 29 IU/L (ref 0–40)
BASOPHILS # BLD AUTO: 0 X10E3/UL (ref 0–0.2)
BASOPHILS NFR BLD AUTO: 0 %
BILIRUB SERPL-MCNC: 0.7 MG/DL (ref 0–1.2)
BUN SERPL-MCNC: 11 MG/DL (ref 8–27)
BUN/CREAT SERPL: 11 (ref 10–24)
CALCIUM SERPL-MCNC: 9 MG/DL (ref 8.6–10.2)
CHLORIDE SERPL-SCNC: 105 MMOL/L (ref 96–106)
CO2 SERPL-SCNC: 24 MMOL/L (ref 20–29)
CREAT SERPL-MCNC: 0.96 MG/DL (ref 0.76–1.27)
EOSINOPHIL # BLD AUTO: 0 X10E3/UL (ref 0–0.4)
EOSINOPHIL NFR BLD AUTO: 1 %
ERYTHROCYTE [DISTWIDTH] IN BLOOD BY AUTOMATED COUNT: 13.3 % (ref 11.6–15.4)
EST. GFR  (NO RACE VARIABLE): 86 ML/MIN/1.73
FERRITIN SERPL-MCNC: 66 NG/ML (ref 30–400)
FOLATE SERPL-MCNC: >20 NG/ML
GLOBULIN SER CALC-MCNC: 2.4 G/DL (ref 1.5–4.5)
GLUCOSE SERPL-MCNC: 94 MG/DL (ref 70–99)
HCT VFR BLD AUTO: 44.6 % (ref 37.5–51)
HGB BLD-MCNC: 14.4 G/DL (ref 13–17.7)
IMM GRANULOCYTES # BLD AUTO: 0 X10E3/UL (ref 0–0.1)
IMM GRANULOCYTES NFR BLD AUTO: 1 %
IRON SATN MFR SERPL: 43 % (ref 15–55)
IRON SERPL-MCNC: 143 UG/DL (ref 38–169)
LYMPHOCYTES # BLD AUTO: 1.7 X10E3/UL (ref 0.7–3.1)
LYMPHOCYTES NFR BLD AUTO: 31 %
MAGNESIUM SERPL-MCNC: 2.1 MG/DL (ref 1.6–2.3)
MCH RBC QN AUTO: 31.8 PG (ref 26.6–33)
MCHC RBC AUTO-ENTMCNC: 32.3 G/DL (ref 31.5–35.7)
MCV RBC AUTO: 99 FL (ref 79–97)
MONOCYTES # BLD AUTO: 0.4 X10E3/UL (ref 0.1–0.9)
MONOCYTES NFR BLD AUTO: 7 %
NEUTROPHILS # BLD AUTO: 3.3 X10E3/UL (ref 1.4–7)
NEUTROPHILS NFR BLD AUTO: 60 %
PLATELET # BLD AUTO: 250 X10E3/UL (ref 150–450)
POTASSIUM SERPL-SCNC: 4.4 MMOL/L (ref 3.5–5.2)
PROT SERPL-MCNC: 6.4 G/DL (ref 6–8.5)
RBC # BLD AUTO: 4.53 X10E6/UL (ref 4.14–5.8)
SODIUM SERPL-SCNC: 140 MMOL/L (ref 134–144)
TIBC SERPL-MCNC: 329 UG/DL (ref 250–450)
UIBC SERPL-MCNC: 186 UG/DL (ref 111–343)
VIT B12 SERPL-MCNC: 1022 PG/ML (ref 232–1245)
WBC # BLD AUTO: 5.5 X10E3/UL (ref 3.4–10.8)

## 2025-06-19 ENCOUNTER — PATIENT MESSAGE (OUTPATIENT)
Dept: FAMILY MEDICINE | Facility: CLINIC | Age: 70
End: 2025-06-19

## 2025-06-19 ENCOUNTER — OFFICE VISIT (OUTPATIENT)
Dept: FAMILY MEDICINE | Facility: CLINIC | Age: 70
End: 2025-06-19
Payer: MEDICARE

## 2025-06-19 VITALS
SYSTOLIC BLOOD PRESSURE: 128 MMHG | HEART RATE: 71 BPM | OXYGEN SATURATION: 98 % | DIASTOLIC BLOOD PRESSURE: 78 MMHG | WEIGHT: 176.25 LBS | HEIGHT: 72 IN | BODY MASS INDEX: 23.87 KG/M2 | TEMPERATURE: 99 F

## 2025-06-19 DIAGNOSIS — N52.9 ERECTILE DYSFUNCTION, UNSPECIFIED ERECTILE DYSFUNCTION TYPE: ICD-10-CM

## 2025-06-19 DIAGNOSIS — C67.9 MALIGNANT NEOPLASM OF URINARY BLADDER, UNSPECIFIED SITE: ICD-10-CM

## 2025-06-19 DIAGNOSIS — I49.8 JUNCTIONAL RHYTHM: ICD-10-CM

## 2025-06-19 DIAGNOSIS — Z90.49 HISTORY OF COLECTOMY: ICD-10-CM

## 2025-06-19 DIAGNOSIS — Z00.00 PHYSICAL EXAM: Primary | ICD-10-CM

## 2025-06-19 DIAGNOSIS — K22.2 ESOPHAGEAL STRICTURE: ICD-10-CM

## 2025-06-19 DIAGNOSIS — Z15.89 MTHFR MUTATION: ICD-10-CM

## 2025-06-19 DIAGNOSIS — G47.33 OSA ON CPAP: ICD-10-CM

## 2025-06-19 DIAGNOSIS — Z87.19 HISTORY OF ULCERATIVE COLITIS: ICD-10-CM

## 2025-06-19 PROCEDURE — 99999 PR PBB SHADOW E&M-EST. PATIENT-LVL IV: CPT | Mod: PBBFAC,,, | Performed by: FAMILY MEDICINE

## 2025-06-19 RX ORDER — TADALAFIL 5 MG/1
5 TABLET ORAL DAILY PRN
Qty: 30 TABLET | Refills: 5 | Status: SHIPPED | OUTPATIENT
Start: 2025-06-19 | End: 2026-06-19

## 2025-06-19 NOTE — PROGRESS NOTES
SCRIBE #1 NOTE: I, Maicol Mcelroy, am scribing for, and in the presence of,  Milan Chen III, MD. I have scribed the entire note.     Subjective:       Patient ID: Faisal Quiros is a 69 y.o. male.    Chief Complaint: Annual Exam    Mr. Quiros is here for an annual exam with his last appointment being here on January 25, 2025.     Social history: Nonsmoker. No alcohol intake of significance. Exercising. Retired.     Family history: No changes.     Past medical history: History of ulcerative colitis. S/P colectomy. BMI of 23.90. Cardiovascular good. No chest pain. No palpitations. Blood pressure is 128/78. Hyperlipidemia. Cholesterol is 168. HDL is 64. LDL is 84. MTHFR. Homocystine is 7.7. On Folic acid 800mcg. No clots. Saw Dr. Blackman 9 days ago. Bladder cancer. He follows up every 6 months. PSA is 1.8. Obstructive sleep apnea. Using CPAP regularly, compliant, benefiting from this. He will sleep 5-6 hours a night. States his AHI is good. Esophageal stricture. Upper scope done in May 2025 with his esophagus being stretched. He will get checked every 3 years. He also quit his Protonix.     Labs: Magnesium is 2.1. Vitamin D is 41.1. Folate is >20.0. B12 is 1,022. Ferritin is 66. Iron is 143. TIBC is 329. CMP and CBC are okay. MCV is 99.     Review of Systems   Constitutional:  Negative for chills and fever.   HENT:  Negative for congestion and sore throat.    Eyes:  Negative for pain and visual disturbance.   Respiratory:  Negative for chest tightness and shortness of breath.    Cardiovascular:  Negative for chest pain.   Gastrointestinal:  Negative for nausea.   Endocrine: Negative for polydipsia and polyuria.   Genitourinary:  Negative for dysuria and flank pain.   Musculoskeletal:  Negative for back pain, neck pain and neck stiffness.   Skin:  Negative for rash.   Allergic/Immunologic: Negative for immunocompromised state.   Neurological:  Negative for dizziness and weakness.   Hematological:  Does not  bruise/bleed easily.   Psychiatric/Behavioral:  Negative for agitation and behavioral problems.    All other systems reviewed and are negative.      Objective:      Physical Exam  Vitals reviewed.   Constitutional:       Appearance: Normal appearance. He is well-developed and normal weight.   HENT:      Head: Normocephalic and atraumatic.      Right Ear: Tympanic membrane normal.      Left Ear: Tympanic membrane normal.      Nose: Nose normal.      Mouth/Throat:      Mouth: Mucous membranes are moist.      Pharynx: No oropharyngeal exudate.   Eyes:      Conjunctiva/sclera: Conjunctivae normal.      Pupils: Pupils are equal, round, and reactive to light.   Neck:      Vascular: No carotid bruit.   Cardiovascular:      Rate and Rhythm: Normal rate and regular rhythm.      Pulses: Normal pulses.      Heart sounds: Normal heart sounds. No murmur heard.    No gallop.   Pulmonary:      Effort: Pulmonary effort is normal.      Breath sounds: Normal breath sounds.   Abdominal:      General: Bowel sounds are normal.      Palpations: Abdomen is soft. There is no mass.      Tenderness: There is no abdominal tenderness.   Musculoskeletal:         General: Normal range of motion.      Cervical back: Normal range of motion.   Skin:     General: Skin is warm and dry.   Neurological:      General: No focal deficit present.      Mental Status: He is alert and oriented to person, place, and time.   Psychiatric:         Mood and Affect: Mood normal.         Behavior: Behavior normal.       Assessment:       1. Physical exam    2. MTHFR mutation    3. SEAN on CPAP    4. History of colectomy    5. History of ulcerative colitis    6. Malignant neoplasm of urinary bladder, unspecified site    7. Esophageal stricture    8. Erectile dysfunction, unspecified erectile dysfunction type    9. Junctional rhythm        Plan:       Physical exam  -     Testosterone,Total; Future; Expected date: 06/19/2025  -     Luteinizing Hormone; Future; Expected  date: 06/19/2025    MTHFR mutation  -     Ambulatory referral/consult to Cardiology; Future; Expected date: 06/26/2025    SEAN on CPAP    History of colectomy    History of ulcerative colitis    Malignant neoplasm of urinary bladder, unspecified site    Esophageal stricture    Erectile dysfunction, unspecified erectile dysfunction type  -     tadalafiL (CIALIS) 5 MG tablet; Take 1 tablet (5 mg total) by mouth daily as needed for Erectile Dysfunction.  Dispense: 30 tablet; Refill: 5    Junctional rhythm      In addition to the physical, he is having some issues with ED and inquired about his previous EKG. Reports Cialis 5mg worked partially. States he has a different issue of not having a physical response. EKG showed junctional rhythm. He had 69 beats per minute. Coronary calcium score is 12 and puts him in 0 to 25th percentile.     Physical examination: Vital signs noted. No acute distress. No carotid bruit. Regular heart rate and rhythm. Lungs clear to auscultation bilaterally. Abdomen bowel sounds positive soft and nontender. Extremities without edema. 2+ pedal pulses.    Impression: ED. Junctional rhythm.     Plan Cialis 5 mg 30 with 5 refills.  Daily prn.  Testosterone level.  LH.  Refer to Cardiology for the junctional rhythm.  Follow-up 1 year.    All care gaps addressed or discussed.     I, Milan Chen III, MD, personally performed the services described in this documentation. All medical record entries made by the scribe were at my direction and in my presence. I have reviewed the chart and agree that the record reflects my personal performance and is accurate and complete.

## 2025-06-22 ENCOUNTER — RESULTS FOLLOW-UP (OUTPATIENT)
Dept: FAMILY MEDICINE | Facility: CLINIC | Age: 70
End: 2025-06-22

## 2025-06-30 ENCOUNTER — PATIENT MESSAGE (OUTPATIENT)
Dept: FAMILY MEDICINE | Facility: CLINIC | Age: 70
End: 2025-06-30
Payer: MEDICARE

## 2025-07-17 ENCOUNTER — TELEPHONE (OUTPATIENT)
Dept: FAMILY MEDICINE | Facility: CLINIC | Age: 70
End: 2025-07-17
Payer: MEDICARE

## 2025-07-17 NOTE — TELEPHONE ENCOUNTER
----- Message from Toshia Turner NP sent at 6/8/2025  9:15 AM CDT -----  Cholesterol and psa are normal.   ----- Message -----  From: Dm Bright  Sent: 6/7/2025   7:13 AM CDT  To: Milan Chen III, MD

## 2025-07-17 NOTE — TELEPHONE ENCOUNTER
----- Message from Milan Chen MD sent at 6/22/2025  9:27 AM CDT -----  NORMAL followup as needed.  ----- Message -----  From: Dm Bright  Sent: 6/22/2025   7:09 AM CDT  To: Milan Chen III, MD

## 2025-08-08 DIAGNOSIS — R07.0 PAIN IN THROAT: Primary | ICD-10-CM

## 2025-08-11 ENCOUNTER — LAB VISIT (OUTPATIENT)
Dept: LAB | Facility: HOSPITAL | Age: 70
End: 2025-08-11
Attending: OTOLARYNGOLOGY
Payer: MEDICARE

## 2025-08-11 DIAGNOSIS — Z01.812 PRE-PROCEDURE LAB EXAM: ICD-10-CM

## 2025-08-11 LAB
CREAT SERPL-MCNC: 1 MG/DL (ref 0.5–1.4)
GFR SERPLBLD CREATININE-BSD FMLA CKD-EPI: >60 ML/MIN/1.73/M2

## 2025-08-11 PROCEDURE — 36415 COLL VENOUS BLD VENIPUNCTURE: CPT

## 2025-08-11 PROCEDURE — 82565 ASSAY OF CREATININE: CPT

## 2025-08-13 ENCOUNTER — HOSPITAL ENCOUNTER (OUTPATIENT)
Dept: RADIOLOGY | Facility: HOSPITAL | Age: 70
Discharge: HOME | End: 2025-08-13
Attending: OTOLARYNGOLOGY
Payer: MEDICARE

## 2025-08-13 DIAGNOSIS — R07.0 PAIN IN THROAT: ICD-10-CM

## 2025-08-13 PROCEDURE — 70491 CT SOFT TISSUE NECK W/DYE: CPT | Mod: 26,,, | Performed by: RADIOLOGY

## 2025-08-13 PROCEDURE — 25500020 PHARM REV CODE 255: Performed by: OTOLARYNGOLOGY

## 2025-08-13 PROCEDURE — 70491 CT SOFT TISSUE NECK W/DYE: CPT | Mod: TC

## 2025-08-13 RX ADMIN — IOHEXOL 100 ML: 350 INJECTION, SOLUTION INTRAVENOUS at 11:08

## 2025-08-14 ENCOUNTER — PATIENT MESSAGE (OUTPATIENT)
Facility: CLINIC | Age: 70
End: 2025-08-14
Payer: MEDICARE

## 2025-08-14 ENCOUNTER — TELEPHONE (OUTPATIENT)
Dept: HEMATOLOGY/ONCOLOGY | Facility: CLINIC | Age: 70
End: 2025-08-14
Payer: MEDICARE

## 2025-08-14 DIAGNOSIS — J35.8 TONSILLAR MASS: Primary | ICD-10-CM

## 2025-08-18 ENCOUNTER — LAB VISIT (OUTPATIENT)
Dept: LAB | Facility: HOSPITAL | Age: 70
End: 2025-08-18
Attending: STUDENT IN AN ORGANIZED HEALTH CARE EDUCATION/TRAINING PROGRAM
Payer: MEDICARE

## 2025-08-18 ENCOUNTER — PATIENT MESSAGE (OUTPATIENT)
Dept: HEMATOLOGY/ONCOLOGY | Facility: CLINIC | Age: 70
End: 2025-08-18

## 2025-08-18 ENCOUNTER — OFFICE VISIT (OUTPATIENT)
Dept: HEMATOLOGY/ONCOLOGY | Facility: CLINIC | Age: 70
End: 2025-08-18
Payer: MEDICARE

## 2025-08-18 VITALS
OXYGEN SATURATION: 96 % | HEIGHT: 72 IN | TEMPERATURE: 99 F | RESPIRATION RATE: 16 BRPM | BODY MASS INDEX: 25.56 KG/M2 | HEART RATE: 86 BPM | DIASTOLIC BLOOD PRESSURE: 80 MMHG | SYSTOLIC BLOOD PRESSURE: 162 MMHG | WEIGHT: 188.69 LBS

## 2025-08-18 DIAGNOSIS — C09.9 TONSIL CANCER: ICD-10-CM

## 2025-08-18 DIAGNOSIS — C09.9 TONSIL CANCER: Primary | ICD-10-CM

## 2025-08-18 PROCEDURE — 3079F DIAST BP 80-89 MM HG: CPT | Mod: CPTII,S$GLB,, | Performed by: STUDENT IN AN ORGANIZED HEALTH CARE EDUCATION/TRAINING PROGRAM

## 2025-08-18 PROCEDURE — 3288F FALL RISK ASSESSMENT DOCD: CPT | Mod: CPTII,S$GLB,, | Performed by: STUDENT IN AN ORGANIZED HEALTH CARE EDUCATION/TRAINING PROGRAM

## 2025-08-18 PROCEDURE — 3077F SYST BP >= 140 MM HG: CPT | Mod: CPTII,S$GLB,, | Performed by: STUDENT IN AN ORGANIZED HEALTH CARE EDUCATION/TRAINING PROGRAM

## 2025-08-18 PROCEDURE — 99205 OFFICE O/P NEW HI 60 MIN: CPT | Mod: S$GLB,,, | Performed by: STUDENT IN AN ORGANIZED HEALTH CARE EDUCATION/TRAINING PROGRAM

## 2025-08-18 PROCEDURE — 1101F PT FALLS ASSESS-DOCD LE1/YR: CPT | Mod: CPTII,S$GLB,, | Performed by: STUDENT IN AN ORGANIZED HEALTH CARE EDUCATION/TRAINING PROGRAM

## 2025-08-18 PROCEDURE — 3008F BODY MASS INDEX DOCD: CPT | Mod: CPTII,S$GLB,, | Performed by: STUDENT IN AN ORGANIZED HEALTH CARE EDUCATION/TRAINING PROGRAM

## 2025-08-18 PROCEDURE — 1126F AMNT PAIN NOTED NONE PRSNT: CPT | Mod: CPTII,S$GLB,, | Performed by: STUDENT IN AN ORGANIZED HEALTH CARE EDUCATION/TRAINING PROGRAM

## 2025-08-18 PROCEDURE — 99999 PR PBB SHADOW E&M-EST. PATIENT-LVL V: CPT | Mod: PBBFAC,,, | Performed by: STUDENT IN AN ORGANIZED HEALTH CARE EDUCATION/TRAINING PROGRAM

## 2025-08-18 PROCEDURE — 1159F MED LIST DOCD IN RCRD: CPT | Mod: CPTII,S$GLB,, | Performed by: STUDENT IN AN ORGANIZED HEALTH CARE EDUCATION/TRAINING PROGRAM

## 2025-08-18 PROCEDURE — 1160F RVW MEDS BY RX/DR IN RCRD: CPT | Mod: CPTII,S$GLB,, | Performed by: STUDENT IN AN ORGANIZED HEALTH CARE EDUCATION/TRAINING PROGRAM

## 2025-08-18 PROCEDURE — 36415 COLL VENOUS BLD VENIPUNCTURE: CPT | Mod: PN

## 2025-08-18 RX ORDER — BACITRACIN 500 UNIT/G
150 OINTMENT (GRAM) TOPICAL DAILY
COMMUNITY
Start: 2025-01-01

## 2025-08-18 RX ORDER — PANTOPRAZOLE SODIUM 40 MG/1
40 TABLET, DELAYED RELEASE ORAL EVERY MORNING
COMMUNITY
Start: 2025-07-08

## 2025-08-19 ENCOUNTER — HOSPITAL ENCOUNTER (OUTPATIENT)
Dept: RADIOLOGY | Facility: HOSPITAL | Age: 70
Discharge: HOME | End: 2025-08-19
Attending: STUDENT IN AN ORGANIZED HEALTH CARE EDUCATION/TRAINING PROGRAM
Payer: MEDICARE

## 2025-08-19 ENCOUNTER — PATIENT MESSAGE (OUTPATIENT)
Facility: CLINIC | Age: 70
End: 2025-08-19
Payer: MEDICARE

## 2025-08-19 DIAGNOSIS — C09.9 TONSIL CANCER: ICD-10-CM

## 2025-08-19 PROCEDURE — 76536 US EXAM OF HEAD AND NECK: CPT | Mod: 26,,, | Performed by: RADIOLOGY

## 2025-08-19 PROCEDURE — 76536 US EXAM OF HEAD AND NECK: CPT | Mod: TC,PO

## 2025-08-20 ENCOUNTER — TELEPHONE (OUTPATIENT)
Dept: OTOLARYNGOLOGY | Facility: CLINIC | Age: 70
End: 2025-08-20
Payer: MEDICARE

## 2025-08-20 DIAGNOSIS — C09.9 TONSIL CANCER: Primary | ICD-10-CM

## 2025-08-20 DIAGNOSIS — J35.8 TONSILLAR MASS: ICD-10-CM

## 2025-08-21 ENCOUNTER — HOSPITAL ENCOUNTER (OUTPATIENT)
Dept: RADIOLOGY | Facility: HOSPITAL | Age: 70
Discharge: HOME | End: 2025-08-21
Attending: STUDENT IN AN ORGANIZED HEALTH CARE EDUCATION/TRAINING PROGRAM
Payer: MEDICARE

## 2025-08-21 ENCOUNTER — PATIENT MESSAGE (OUTPATIENT)
Facility: CLINIC | Age: 70
End: 2025-08-21
Payer: MEDICARE

## 2025-08-21 DIAGNOSIS — C09.9 TONSIL CANCER: ICD-10-CM

## 2025-08-21 LAB — GLUCOSE SERPL-MCNC: 89 MG/DL (ref 70–110)

## 2025-08-21 PROCEDURE — 78815 PET IMAGE W/CT SKULL-THIGH: CPT | Mod: TC,PI,PN

## 2025-08-21 PROCEDURE — 78815 PET IMAGE W/CT SKULL-THIGH: CPT | Mod: 26,PI,, | Performed by: STUDENT IN AN ORGANIZED HEALTH CARE EDUCATION/TRAINING PROGRAM

## 2025-08-21 PROCEDURE — A9552 F18 FDG: HCPCS | Mod: PN | Performed by: STUDENT IN AN ORGANIZED HEALTH CARE EDUCATION/TRAINING PROGRAM

## 2025-08-21 RX ORDER — FLUDEOXYGLUCOSE F18 500 MCI/ML
11.5 INJECTION INTRAVENOUS
Status: COMPLETED | OUTPATIENT
Start: 2025-08-21 | End: 2025-08-21

## 2025-08-21 RX ADMIN — FLUDEOXYGLUCOSE F-18 11.5 MILLICURIE: 500 INJECTION INTRAVENOUS at 08:08

## 2025-08-22 ENCOUNTER — HOSPITAL ENCOUNTER (OUTPATIENT)
Dept: RADIOLOGY | Facility: HOSPITAL | Age: 70
Discharge: HOME | End: 2025-08-22
Attending: STUDENT IN AN ORGANIZED HEALTH CARE EDUCATION/TRAINING PROGRAM
Payer: MEDICARE

## 2025-08-22 DIAGNOSIS — C09.9 TONSIL CANCER: ICD-10-CM

## 2025-08-22 PROCEDURE — 27200940 US FINE NEEDLE ASPIRATION BIOPSY, FIRST LESION

## 2025-08-22 PROCEDURE — 88305 TISSUE EXAM BY PATHOLOGIST: CPT | Mod: TC | Performed by: PATHOLOGY

## 2025-08-25 ENCOUNTER — PATIENT MESSAGE (OUTPATIENT)
Dept: HEMATOLOGY/ONCOLOGY | Facility: CLINIC | Age: 70
End: 2025-08-25
Payer: MEDICARE

## 2025-08-26 ENCOUNTER — PATIENT MESSAGE (OUTPATIENT)
Dept: FAMILY MEDICINE | Facility: CLINIC | Age: 70
End: 2025-08-26
Payer: MEDICARE

## 2025-08-26 ENCOUNTER — PATIENT MESSAGE (OUTPATIENT)
Facility: CLINIC | Age: 70
End: 2025-08-26
Payer: MEDICARE

## 2025-08-26 ENCOUNTER — PATIENT MESSAGE (OUTPATIENT)
Dept: HEMATOLOGY/ONCOLOGY | Facility: CLINIC | Age: 70
End: 2025-08-26
Payer: MEDICARE

## 2025-08-27 ENCOUNTER — OFFICE VISIT (OUTPATIENT)
Dept: PODIATRY | Facility: CLINIC | Age: 70
End: 2025-08-27
Payer: MEDICARE

## 2025-08-27 VITALS — HEIGHT: 72 IN | BODY MASS INDEX: 24.36 KG/M2 | WEIGHT: 179.88 LBS | RESPIRATION RATE: 16 BRPM

## 2025-08-27 DIAGNOSIS — M79.674 GREAT TOE PAIN, RIGHT: ICD-10-CM

## 2025-08-27 DIAGNOSIS — L60.0 INGROWN NAIL: Primary | ICD-10-CM

## 2025-08-27 PROCEDURE — 99999 PR PBB SHADOW E&M-EST. PATIENT-LVL III: CPT | Mod: PBBFAC,,, | Performed by: PODIATRIST

## 2025-08-27 RX ORDER — CEPHALEXIN 500 MG/1
500 CAPSULE ORAL EVERY 12 HOURS
Qty: 10 CAPSULE | Refills: 0 | Status: SHIPPED | OUTPATIENT
Start: 2025-08-27 | End: 2025-09-01

## 2025-08-28 ENCOUNTER — OFFICE VISIT (OUTPATIENT)
Dept: HEMATOLOGY/ONCOLOGY | Facility: CLINIC | Age: 70
End: 2025-08-28
Payer: MEDICARE

## 2025-08-28 VITALS
SYSTOLIC BLOOD PRESSURE: 150 MMHG | WEIGHT: 176.81 LBS | TEMPERATURE: 98 F | HEIGHT: 72 IN | HEART RATE: 75 BPM | DIASTOLIC BLOOD PRESSURE: 70 MMHG | RESPIRATION RATE: 18 BRPM | BODY MASS INDEX: 23.95 KG/M2 | OXYGEN SATURATION: 99 %

## 2025-08-28 DIAGNOSIS — C09.9 TONSIL CANCER: Primary | ICD-10-CM

## 2025-08-28 PROCEDURE — 3288F FALL RISK ASSESSMENT DOCD: CPT | Mod: CPTII,S$GLB,, | Performed by: STUDENT IN AN ORGANIZED HEALTH CARE EDUCATION/TRAINING PROGRAM

## 2025-08-28 PROCEDURE — 1126F AMNT PAIN NOTED NONE PRSNT: CPT | Mod: CPTII,S$GLB,, | Performed by: STUDENT IN AN ORGANIZED HEALTH CARE EDUCATION/TRAINING PROGRAM

## 2025-08-28 PROCEDURE — 99999 PR PBB SHADOW E&M-EST. PATIENT-LVL IV: CPT | Mod: PBBFAC,,, | Performed by: STUDENT IN AN ORGANIZED HEALTH CARE EDUCATION/TRAINING PROGRAM

## 2025-08-28 PROCEDURE — 3078F DIAST BP <80 MM HG: CPT | Mod: CPTII,S$GLB,, | Performed by: STUDENT IN AN ORGANIZED HEALTH CARE EDUCATION/TRAINING PROGRAM

## 2025-08-28 PROCEDURE — 99215 OFFICE O/P EST HI 40 MIN: CPT | Mod: S$GLB,,, | Performed by: STUDENT IN AN ORGANIZED HEALTH CARE EDUCATION/TRAINING PROGRAM

## 2025-08-28 PROCEDURE — 3008F BODY MASS INDEX DOCD: CPT | Mod: CPTII,S$GLB,, | Performed by: STUDENT IN AN ORGANIZED HEALTH CARE EDUCATION/TRAINING PROGRAM

## 2025-08-28 PROCEDURE — 1101F PT FALLS ASSESS-DOCD LE1/YR: CPT | Mod: CPTII,S$GLB,, | Performed by: STUDENT IN AN ORGANIZED HEALTH CARE EDUCATION/TRAINING PROGRAM

## 2025-08-28 PROCEDURE — 3077F SYST BP >= 140 MM HG: CPT | Mod: CPTII,S$GLB,, | Performed by: STUDENT IN AN ORGANIZED HEALTH CARE EDUCATION/TRAINING PROGRAM

## 2025-08-28 PROCEDURE — 1159F MED LIST DOCD IN RCRD: CPT | Mod: CPTII,S$GLB,, | Performed by: STUDENT IN AN ORGANIZED HEALTH CARE EDUCATION/TRAINING PROGRAM

## 2025-08-28 PROCEDURE — G2211 COMPLEX E/M VISIT ADD ON: HCPCS | Mod: S$GLB,,, | Performed by: STUDENT IN AN ORGANIZED HEALTH CARE EDUCATION/TRAINING PROGRAM

## 2025-09-04 ENCOUNTER — OFFICE VISIT (OUTPATIENT)
Facility: CLINIC | Age: 70
End: 2025-09-04
Payer: MEDICARE

## 2025-09-04 ENCOUNTER — PATIENT MESSAGE (OUTPATIENT)
Dept: HEMATOLOGY/ONCOLOGY | Facility: CLINIC | Age: 70
End: 2025-09-04
Payer: MEDICARE

## 2025-09-04 VITALS
HEART RATE: 97 BPM | BODY MASS INDEX: 24.38 KG/M2 | SYSTOLIC BLOOD PRESSURE: 136 MMHG | TEMPERATURE: 98 F | WEIGHT: 180 LBS | OXYGEN SATURATION: 98 % | DIASTOLIC BLOOD PRESSURE: 94 MMHG | HEIGHT: 72 IN | RESPIRATION RATE: 16 BRPM

## 2025-09-04 DIAGNOSIS — I82.A11: ICD-10-CM

## 2025-09-04 DIAGNOSIS — C09.9 TONSIL CANCER: ICD-10-CM

## 2025-09-04 DIAGNOSIS — M79.609 PAIN IN EXTREMITY, UNSPECIFIED EXTREMITY: ICD-10-CM

## 2025-09-04 DIAGNOSIS — C67.9 MALIGNANT NEOPLASM OF URINARY BLADDER, UNSPECIFIED SITE: Primary | ICD-10-CM

## 2025-09-04 DIAGNOSIS — Z90.49 HISTORY OF COLECTOMY: ICD-10-CM

## 2025-09-04 DIAGNOSIS — Z01.818 PRE-OP TESTING: Primary | ICD-10-CM

## 2025-09-04 DIAGNOSIS — Z15.89 MTHFR MUTATION: ICD-10-CM

## 2025-09-04 PROCEDURE — 99215 OFFICE O/P EST HI 40 MIN: CPT | Mod: S$GLB,,, | Performed by: INTERNAL MEDICINE

## 2025-09-04 PROCEDURE — 1126F AMNT PAIN NOTED NONE PRSNT: CPT | Mod: CPTII,S$GLB,, | Performed by: INTERNAL MEDICINE

## 2025-09-04 PROCEDURE — 3075F SYST BP GE 130 - 139MM HG: CPT | Mod: CPTII,S$GLB,, | Performed by: INTERNAL MEDICINE

## 2025-09-04 PROCEDURE — 1159F MED LIST DOCD IN RCRD: CPT | Mod: CPTII,S$GLB,, | Performed by: INTERNAL MEDICINE

## 2025-09-04 PROCEDURE — 3008F BODY MASS INDEX DOCD: CPT | Mod: CPTII,S$GLB,, | Performed by: INTERNAL MEDICINE

## 2025-09-04 PROCEDURE — 1101F PT FALLS ASSESS-DOCD LE1/YR: CPT | Mod: CPTII,S$GLB,, | Performed by: INTERNAL MEDICINE

## 2025-09-04 PROCEDURE — 3080F DIAST BP >= 90 MM HG: CPT | Mod: CPTII,S$GLB,, | Performed by: INTERNAL MEDICINE

## 2025-09-04 PROCEDURE — 99999 PR PBB SHADOW E&M-EST. PATIENT-LVL IV: CPT | Mod: PBBFAC,,, | Performed by: INTERNAL MEDICINE

## 2025-09-04 PROCEDURE — 1160F RVW MEDS BY RX/DR IN RCRD: CPT | Mod: CPTII,S$GLB,, | Performed by: INTERNAL MEDICINE

## 2025-09-04 PROCEDURE — 3288F FALL RISK ASSESSMENT DOCD: CPT | Mod: CPTII,S$GLB,, | Performed by: INTERNAL MEDICINE

## 2025-09-04 PROCEDURE — G2211 COMPLEX E/M VISIT ADD ON: HCPCS | Mod: S$GLB,,, | Performed by: INTERNAL MEDICINE

## (undated) DEVICE — SOLUTION NACL 0.9% 3000ML

## (undated) DEVICE — SUTURE ETHILON 3-0 PS-1 18 1663H

## (undated) DEVICE — GLOVE BIOGEL PI GOLD SZ  8.5

## (undated) DEVICE — PAD BOVIE ADULT

## (undated) DEVICE — TUBING CYSTO DOUBLE 654301

## (undated) DEVICE — BLADE AGRESSIVE PLUS 4.0 375-544-000

## (undated) DEVICE — SOLUTION IRRI NS BOTTLE 1000ML R5200-01

## (undated) DEVICE — DRAPE IOBAN 23X33 6651EZ

## (undated) DEVICE — PACK BASIC V 88151

## (undated) DEVICE — PACK CYSTOSCOPY III

## (undated) DEVICE — EVACUATOR UROVAC 730125

## (undated) DEVICE — TUBING CYSTO IRRIGATION SET 4LEAD

## (undated) DEVICE — GLOVE BIOGEL PI ULTRA TOUCH GRAY SZ7.5

## (undated) DEVICE — SYRINGE CATH TIP 60ML 309620

## (undated) DEVICE — BAG DRAINAGE 4000ML 153509

## (undated) DEVICE — DRAPE SPLIT W/ ADHESIVE

## (undated) DEVICE — SLING ULTRA LARGE 11-0138-4

## (undated) DEVICE — PAD ABD 5X9   7196D

## (undated) DEVICE — RESECTOR 5.5MM

## (undated) DEVICE — TUBING CYSTO SINGLE V4608-20

## (undated) DEVICE — DRAPE IMPERVIOUS SPLIT 89331

## (undated) DEVICE — SET BASIN O R 31451126

## (undated) DEVICE — ELECTRO LOOP 24FR MEDIUM

## (undated) DEVICE — POUCH INSTRUMENT 1018

## (undated) DEVICE — Device

## (undated) DEVICE — DRAPE ARTHROSCOPY SHOULDER 89492

## (undated) DEVICE — SOLUTION IRRI H2O BOTTLE 1000ML

## (undated) DEVICE — UNDERGLOVE BIOGEL PI MICRO BLUE SZ 6.5

## (undated) DEVICE — SPONGE GAUZE 10S 4X4  442214

## (undated) DEVICE — JELLY LUBRICATING TUBE 4OZ 4OZLUB

## (undated) DEVICE — STRAP TABLE 5X72 M5173

## (undated) DEVICE — DRESSING ADAPTIC 3X8 2015

## (undated) DEVICE — CATHETER STATLOCK ADULT

## (undated) DEVICE — SCRUB BACTOSHIELD 134439

## (undated) DEVICE — SOLUTION H2O IRRIGATION 3000ML R8006

## (undated) DEVICE — DRAPE 3/4

## (undated) DEVICE — UNDERGLOVE BIOGEL MICRO BLUE SZ 8.5

## (undated) DEVICE — DRAPE U-SLOT 1019

## (undated) DEVICE — SYRINGE 30ML 302832

## (undated) DEVICE — BANDAGE COBAN 6 CBN1106

## (undated) DEVICE — NEEDLE SPINAL 18GA 3 1/2 333350

## (undated) DEVICE — GLOVE #7.5 BIOGEL 30475

## (undated) DEVICE — COVER LIGHT HANDLE LB53

## (undated) DEVICE — STOCKINETTE IMPERVIOUS 9X44

## (undated) DEVICE — TUBING SUCTION 12' ST2003

## (undated) DEVICE — STRIP TRACTION LG 3874-03

## (undated) DEVICE — BLADE 90-S SERFAS 3.5 279-351-100